# Patient Record
Sex: MALE
[De-identification: names, ages, dates, MRNs, and addresses within clinical notes are randomized per-mention and may not be internally consistent; named-entity substitution may affect disease eponyms.]

---

## 2021-04-20 ENCOUNTER — HOSPITAL ENCOUNTER (INPATIENT)
Dept: HOSPITAL 97 - ER | Age: 41
LOS: 9 days | Discharge: HOME | DRG: 660 | End: 2021-04-29
Attending: FAMILY MEDICINE | Admitting: HOSPITALIST
Payer: SELF-PAY

## 2021-04-20 VITALS — BODY MASS INDEX: 41.1 KG/M2

## 2021-04-20 DIAGNOSIS — K59.00: ICD-10-CM

## 2021-04-20 DIAGNOSIS — N13.8: ICD-10-CM

## 2021-04-20 DIAGNOSIS — E87.8: ICD-10-CM

## 2021-04-20 DIAGNOSIS — F17.210: ICD-10-CM

## 2021-04-20 DIAGNOSIS — N17.9: ICD-10-CM

## 2021-04-20 DIAGNOSIS — E11.22: ICD-10-CM

## 2021-04-20 DIAGNOSIS — I12.9: ICD-10-CM

## 2021-04-20 DIAGNOSIS — N13.6: Primary | ICD-10-CM

## 2021-04-20 DIAGNOSIS — B96.20: ICD-10-CM

## 2021-04-20 DIAGNOSIS — E11.40: ICD-10-CM

## 2021-04-20 DIAGNOSIS — Z79.4: ICD-10-CM

## 2021-04-20 DIAGNOSIS — Z16.12: ICD-10-CM

## 2021-04-20 DIAGNOSIS — E87.1: ICD-10-CM

## 2021-04-20 DIAGNOSIS — R60.9: ICD-10-CM

## 2021-04-20 DIAGNOSIS — E83.42: ICD-10-CM

## 2021-04-20 DIAGNOSIS — R33.9: ICD-10-CM

## 2021-04-20 DIAGNOSIS — N18.9: ICD-10-CM

## 2021-04-20 DIAGNOSIS — N30.00: ICD-10-CM

## 2021-04-20 DIAGNOSIS — Z20.822: ICD-10-CM

## 2021-04-20 DIAGNOSIS — E87.6: ICD-10-CM

## 2021-04-20 DIAGNOSIS — D63.8: ICD-10-CM

## 2021-04-20 DIAGNOSIS — E66.9: ICD-10-CM

## 2021-04-20 DIAGNOSIS — E11.65: ICD-10-CM

## 2021-04-20 DIAGNOSIS — R00.0: ICD-10-CM

## 2021-04-20 DIAGNOSIS — E44.0: ICD-10-CM

## 2021-04-20 LAB
ALBUMIN SERPL BCP-MCNC: 2.8 G/DL (ref 3.4–5)
ALP SERPL-CCNC: 199 U/L (ref 45–117)
ALT SERPL W P-5'-P-CCNC: 12 U/L (ref 12–78)
AST SERPL W P-5'-P-CCNC: 8 U/L (ref 15–37)
BUN BLD-MCNC: 37 MG/DL (ref 7–18)
GLUCOSE SERPLBLD-MCNC: 775 MG/DL (ref 74–106)
HCT VFR BLD CALC: 31.7 % (ref 39.6–49)
INR BLD: 1.05
LIPASE SERPL-CCNC: 160 U/L (ref 73–393)
LYMPHOCYTES # SPEC AUTO: 2.6 K/UL (ref 0.7–4.9)
MAGNESIUM SERPL-MCNC: 2.2 MG/DL (ref 1.8–2.4)
NT-PROBNP SERPL-MCNC: 181 PG/ML (ref ?–125)
PMV BLD: 9.5 FL (ref 7.6–11.3)
POTASSIUM SERPL-SCNC: 3.9 MMOL/L (ref 3.5–5.1)
RBC # BLD: 3.55 M/UL (ref 4.33–5.43)
TROPONIN (EMERG DEPT USE ONLY): < 0.02 NG/ML (ref 0–0.04)

## 2021-04-20 PROCEDURE — 85025 COMPLETE CBC W/AUTO DIFF WBC: CPT

## 2021-04-20 PROCEDURE — 84439 ASSAY OF FREE THYROXINE: CPT

## 2021-04-20 PROCEDURE — 96366 THER/PROPH/DIAG IV INF ADDON: CPT

## 2021-04-20 PROCEDURE — 87086 URINE CULTURE/COLONY COUNT: CPT

## 2021-04-20 PROCEDURE — 96372 THER/PROPH/DIAG INJ SC/IM: CPT

## 2021-04-20 PROCEDURE — 83605 ASSAY OF LACTIC ACID: CPT

## 2021-04-20 PROCEDURE — 74430 CONTRAST X-RAY BLADDER: CPT

## 2021-04-20 PROCEDURE — 93306 TTE W/DOPPLER COMPLETE: CPT

## 2021-04-20 PROCEDURE — 94760 N-INVAS EAR/PLS OXIMETRY 1: CPT

## 2021-04-20 PROCEDURE — 85610 PROTHROMBIN TIME: CPT

## 2021-04-20 PROCEDURE — 87088 URINE BACTERIA CULTURE: CPT

## 2021-04-20 PROCEDURE — 84100 ASSAY OF PHOSPHORUS: CPT

## 2021-04-20 PROCEDURE — 80061 LIPID PANEL: CPT

## 2021-04-20 PROCEDURE — 96375 TX/PRO/DX INJ NEW DRUG ADDON: CPT

## 2021-04-20 PROCEDURE — 87186 SC STD MICRODIL/AGAR DIL: CPT

## 2021-04-20 PROCEDURE — 82947 ASSAY GLUCOSE BLOOD QUANT: CPT

## 2021-04-20 PROCEDURE — 93005 ELECTROCARDIOGRAM TRACING: CPT

## 2021-04-20 PROCEDURE — 80048 BASIC METABOLIC PNL TOTAL CA: CPT

## 2021-04-20 PROCEDURE — 99285 EMERGENCY DEPT VISIT HI MDM: CPT

## 2021-04-20 PROCEDURE — 96367 TX/PROPH/DG ADDL SEQ IV INF: CPT

## 2021-04-20 PROCEDURE — 87077 CULTURE AEROBIC IDENTIFY: CPT

## 2021-04-20 PROCEDURE — 83735 ASSAY OF MAGNESIUM: CPT

## 2021-04-20 PROCEDURE — 82550 ASSAY OF CK (CPK): CPT

## 2021-04-20 PROCEDURE — 51600 INJECTION FOR BLADDER X-RAY: CPT

## 2021-04-20 PROCEDURE — 83880 ASSAY OF NATRIURETIC PEPTIDE: CPT

## 2021-04-20 PROCEDURE — 76377 3D RENDER W/INTRP POSTPROCES: CPT

## 2021-04-20 PROCEDURE — 83690 ASSAY OF LIPASE: CPT

## 2021-04-20 PROCEDURE — 78708 K FLOW/FUNCT IMAGE W/DRUG: CPT

## 2021-04-20 PROCEDURE — 87040 BLOOD CULTURE FOR BACTERIA: CPT

## 2021-04-20 PROCEDURE — 84443 ASSAY THYROID STIM HORMONE: CPT

## 2021-04-20 PROCEDURE — 80076 HEPATIC FUNCTION PANEL: CPT

## 2021-04-20 PROCEDURE — 81003 URINALYSIS AUTO W/O SCOPE: CPT

## 2021-04-20 PROCEDURE — 74176 CT ABD & PELVIS W/O CONTRAST: CPT

## 2021-04-20 PROCEDURE — 71045 X-RAY EXAM CHEST 1 VIEW: CPT

## 2021-04-20 PROCEDURE — 84484 ASSAY OF TROPONIN QUANT: CPT

## 2021-04-20 PROCEDURE — 80053 COMPREHEN METABOLIC PANEL: CPT

## 2021-04-20 PROCEDURE — 36569 INSJ PICC 5 YR+ W/O IMAGING: CPT

## 2021-04-20 PROCEDURE — 76770 US EXAM ABDO BACK WALL COMP: CPT

## 2021-04-20 PROCEDURE — 36415 COLL VENOUS BLD VENIPUNCTURE: CPT

## 2021-04-20 PROCEDURE — 81015 MICROSCOPIC EXAM OF URINE: CPT

## 2021-04-20 PROCEDURE — 96365 THER/PROPH/DIAG IV INF INIT: CPT

## 2021-04-20 PROCEDURE — 83036 HEMOGLOBIN GLYCOSYLATED A1C: CPT

## 2021-04-20 PROCEDURE — 84550 ASSAY OF BLOOD/URIC ACID: CPT

## 2021-04-20 NOTE — ER
Nurse's Notes                                                                                     

 [facility]                                                                 

Name: Julio Cesar Reeves                                                                                

Age: 40 yrs                                                                                       

Sex: Male                                                                                         

: 1980                                                                                   

MRN: [id number]                                                                                   

Arrival Date: 2021                                                                          

Time: 18:32                                                                                       

Account#: [id number]                                                                            

Bed 26                                                                                            

Private MD:                                                                                       

Diagnosis: Retention of urine-bladder outlet obstruction. PVR 1500 CC;Urinary tract infection,    

  site not specified;Acute kidney failure;Type 2 diabetes                                         

  mellitus-uncontrolled;Hydroureter;Hydronephrosis with ureteral stricture, not                   

  elsewhere classified-secondary to bladder outlet obstruction;Essential (primary)                

  hypertension                                                                                    

                                                                                                  

Presentation:                                                                                     

                                                                                             

19:00 Chief complaint: Patient states: I am having numbness, weakness and my feet are swollen rr5 

      started around February. I feel that i gets worse now. I've been diagnosed with kidney      

      infection last February and they said i have kidney infection.                              

19:00 Coronavirus screen: Client denies travel out of the [place] in the last 14 days. At this   rr5 

      time, the client does not indicate any symptoms associated with coronavirus-19. Ebola       

      Screen: Patient negative for fever greater than or equal to 101.5 degrees Fahrenheit,       

      and additional compatible Ebola Virus Disease symptoms Patient denies exposure to           

      infectious person. Patient denies travel to an Ebola-affected area in the 21 days           

      before illness onset. Initial Sepsis Screen: Does the patient meet any 2 criteria? No.      

      Patient's initial sepsis screen is negative. Does the patient have a suspected source       

      of infection? No. Patient's initial sepsis screen is negative. Risk Assessment: Do you      

      want to hurt yourself or someone else? Patient reports no desire to harm self or            

      others. Onset of symptoms was 2021.                                                

19:00 Method Of Arrival: Wheelchair                                                           rr5 

19:00 Acuity: KAREEM 2                                                                           rr5 

                                                                                                  

Historical:                                                                                       

- Allergies:                                                                                      

19:09 No Known Allergies;                                                                     rr5 

- Home Meds:                                                                                      

19:09 None [Active];                                                                          rr5 

- PMHx:                                                                                           

19:09 Diabetes - NIDDM; Hypertension; kidney infection;                                       rr5 

- PSHx:                                                                                           

19:09 None;                                                                                   rr5 

                                                                                                  

- Immunization history:: Adult Immunizations up to date.                                          

- Social history:: Smoking status: Patient reports the use of cigarette tobacco                   

  products, smokes one pack cigarettes per day. Patient uses alcohol, street drugs.               

                                                                                                  

                                                                                                  

Screenin:00 Abuse screen: Denies threats or abuse. Denies injuries from another. Nutritional          

      screening: No deficits noted. Tuberculosis screening: No symptoms or risk factors           

      identified. Fall Risk None identified.                                                      

                                                                                                  

Assessment:                                                                                       

19:30 General: Appears in no apparent distress. Behavior is calm, cooperative, appropriate    wh  

      for age. Pain: Denies pain. Neuro: Level of Consciousness is awake, alert, obeys            

      commands, Oriented to person, place, time, situation, Appropriate for age.                  

      Cardiovascular: Heart tones S1 S2 Edema BLE. Respiratory: Airway is patent Respiratory      

      effort is even, unlabored, Respiratory pattern is regular, symmetrical, Breath sounds       

      are clear bilaterally. GI: Abdomen is round non-distended. : Reports urinary              

      frequency. EENT: No signs and/or symptoms were reported regarding the EENT system.          

      Derm: Skin is intact, is healthy with good turgor, Skin is pink, warm \T\ dry. normal.      

      Musculoskeletal: Circulation, motion, and sensation intact.                                 

21:00 Reassessment: Patient appears in no apparent distress at this time. No changes from       

      previously documented assessment. Patient and/or family updated on plan of care and         

      expected duration. Pain level reassessed. Patient is alert, oriented x 3, equal             

      unlabored respirations, skin warm/dry/pink.                                                 

21:20 Reassessment: MD at bedside explaining POC, Pt refusing all treatments and wanting to   wh  

      sign out AMA.                                                                               

22:00 Reassessment: Pt decided to be admitted and agreeable to POC.                             

23:00 Reassessment: Patient appears in no apparent distress at this time. Patient and/or        

      family updated on plan of care and expected duration. Pain level reassessed. Patient is     

      alert, oriented x 3, equal unlabored respirations, skin warm/dry/pink.                      

                                                                                             

00:00 Reassessment: Provider at bedside explaining POC need foradmit.                           

                                                                                                  

Vital Signs:                                                                                      

                                                                                             

19:00  / 115; Pulse 111; Resp 19; Temp 99; Pulse Ox 100% ; Weight 108.86 kg; Height 5   rr5 

      ft. 4 in. (162.56 cm); Pain 7/10;                                                           

21:00  / 115; Pulse 113; Resp 18; Pulse Ox 100% on R/A;                                 wh  

22:00  / 102; Pulse 115; Resp 18; Pulse Ox 99% ;                                          

                                                                                             

00:00  / 94; Pulse 117; Resp 18; Pulse Ox 100% ;                                        wh  

                                                                                             

19:00 Body Mass Index 41.20 (108.86 kg, 162.56 cm)                                            rr5 

                                                                                                  

ED Course:                                                                                        

                                                                                             

18:32 Patient arrived in ED.                                                                  as  

19:08 Triage completed.                                                                       rr5 

19:10 Arm band placed on right wrist.                                                         rr5 

19:31 Tha Edwards MD is Attending Physician.                                             sheila 

19:40 Missed attempt(s): 22 gauge in left antecubital area. Bleeding controlled, band aid     ds4 

      applied, catheter tip intact.                                                               

20:00 Patient has correct armband on for positive identification. Placed in gown. Bed in low  wh  

      position. Call light in reach. Side rails up X 1. Cardiac monitor on. Pulse ox on. NIBP     

      on.                                                                                         

20:06 XRAY Chest (1 view) In Process Unspecified.                                             EDMS

20:10 Kirby Carranza, RODERICK is Primary Nurse.                                                  mg2 

20:15 CT Stone Protocol In Process Unspecified.                                               EDMS

20:25 Inserted saline lock: 20 gauge in right upper arm, using aseptic technique. Blood       rr5 

      collected.                                                                                  

20:25 First set of blood cultures drawn by me.                                                rr5 

20:40 Second set of blood cultures drawn by me.                                               rr5 

20:42 COVID swab sent to lab.                                                                 rr5 

20:52 initiated a transfer with Latonya Hi from [facility].          mw2 

21:24 doc to doc with the Urologist from [facility].                                 mw2 

21:26 transfer canceled.                                                                      mw2 

21:27 Valeri Gardner MD is Hospitalizing Provider.                                           sheila 

                                                                                             

00:30 No provider procedures requiring assistance completed. Patient admitted, IV remains in    

      place.                                                                                      

                                                                                                  

Administered Medications:                                                                         

                                                                                             

20:35 Drug: NS 0.9% 1000 ml Route: IV; Rate: 1 bolus; Site: right upper arm;                  rr5 

22:55 Follow up: Response: No adverse reaction; IV Status: Completed infusion                   

20:35 Drug: Rocephin - (cefTRIAXone) 1 grams Route: IVPB; Infused Over: 30 mins; Site: right  rr5 

      upper arm;                                                                                  

22:54 Follow up: Response: No adverse reaction; IV Status: Completed infusion                   

20:36 Drug: NS 0.9% 1000 ml Route: IV; Rate: 1 bolus; Site: right upper arm;                  rr5 

22:55 Follow up: Response: No adverse reaction; IV Status: Completed infusion                   

20:38 Drug: Insulin Regular Human 10 units {Co-Signature: rr5 (Sreekanth Angulo RN).} Route:     mg2 

      IVP; Site: right upper arm;                                                                 

22:55 Follow up: Response: No adverse reaction; Blood sugar is lowered                          

20:40 Drug: Insulin Regular Human 10 units {Co-Signature: rr5 (Sreekanth Angulo RN).} Route:     mg2 

      Sub-Q; Site: right lower abdomen;                                                           

22:55 Follow up: Response: No adverse reaction; Blood sugar is lowered                          

22:00 Drug: Viscous Lidocaine Liquid (4 %) 5 ml Route: Mucous Membrane;                         

22:01 Not Given (Duplicate Order): levofloxacin 500 mg 100 ml IVPB once over 60 mins          [facility] 

22:08 Drug: levofloxacin 500 mg Volume: 100 ml; Route: IVPB; Infused Over: 60 mins; Site:       

      right upper arm;                                                                            

22:54 Follow up: Response: No adverse reaction; IV Status: Completed infusion                   

22:54 Drug: Meropenem 1 grams Route: IV; Rate: per protocol; Site: right upper arm;             

                                                                                             

00:36 Follow up: Response: No adverse reaction; IV Status: Completed infusion                   

                                                                                             

23:51 Drug: Zofran (Ondansetron) 4 mg Route: IVP; Site: right upper arm;                        

                                                                                             

00:36 Follow up: Response: No adverse reaction                                                  

                                                                                             

23:53 Drug: morphine 4 mg {Note: RASS 0.} Route: IVP; Site: right upper arm;                    

                                                                                             

00:36 Follow up: Response: No adverse reaction; Pain is decreased; RASS: Alert and Calm (0)     

                                                                                             

23:55 Drug: LanTUS (insulin glargine) 30 units Route: Sub-Q; Site: right lower abdomen;         

                                                                                             

00:36 Follow up: Response: No adverse reaction                                                  

                                                                                             

23:57 Drug: Insulin Regular Human 10 units {Co-Signature: mg2 (Kirby Carranza RN).} Route:   wh  

      IVP; Site: right upper arm;                                                                 

                                                                                             

00:53 Follow up: Response: No adverse reaction; Blood sugar is lowered                          

                                                                                                  

                                                                                                  

Outcome:                                                                                          

                                                                                             

20:48 ER care complete, transfer ordered by MD.                                               sheila 

21:30 Decision to Hospitalize by Provider.                                                    sheila 

                                                                                             

00:30 Admitted to ER Hold.  Please see [facility] for further documentation.                      

      Condition: stable                                                                           

      Instructed on the need for admit.                                                           

18:06 Patient left the ED.                                                                    aa5 

                                                                                                  

Signatures:                                                                                       

Dispatcher [employer]                           EDTha Solis MD MD cha Martinez, Amelia as Calderon, Audri, RN                     RN   aa5                                                  

Randal Mendoza                             ds4                                                  

Kapil London RN                       RN                                                      

Susanna Rankin                            2                                                  

Kirby Carranza RN                    RN   mg2                                                  

Sreekanth Angulo RN                      RN   rr5                                                  

Sreekanth Angulo RN                             rr5                                                  

Kirby Carranza RN                           mg2                                                  

                                                                                                  

Corrections: (The following items were deleted from the chart)                                    

                                                                                             

19:13 19:00 Acuity: KAREEM 3 rr5                                                                 rr5 

                                                                                                  

**************************************************************************************************

## 2021-04-20 NOTE — EDPHYS
Physician Documentation                                                                           

 [facility]                                                                 

Name: Julio Cesar Reeves                                                                                

Age: 40 yrs                                                                                       

Sex: Male                                                                                         

: 1980                                                                                   

MRN: [id number]                                                                                   

Arrival Date: 2021                                                                          

Time: 18:32                                                                                       

Account#: [id number]                                                                            

Bed 26                                                                                            

Private MD:                                                                                       

ED Physician Tha Edwards                                                                      

HPI:                                                                                              

                                                                                             

19:40 This 40 yrs old  Male presents to ER via Wheelchair with complaints of Leg      sheila 

      Swelling, Feet Swelling, Numbness, kidney infection.                                        

19:40 The patient's problem is reported as weakness, that is generalized. Onset: The          sheila 

      symptoms/episode began/occurred 1 week(s) ago. Duration: The episode is continuous.         

      Context: the episode(s) was witnessed, by family. The symptoms are alleviated by            

      nothing. The symptoms are aggravated by nothing. Severity of symptoms: At their worst       

      the symptoms were mild in the emergency department the symptoms are unchanged.              

      Patient's baseline: Neuro: alert and fully oriented, Motor: no deficits, Ambulation:        

      walks without assistance, Speech: normal, normal for age. The patient has experienced       

      similar episodes in the past, several times.                                                

                                                                                                  

Historical:                                                                                       

- Allergies:                                                                                      

19:09 No Known Allergies;                                                                     rr5 

- Home Meds:                                                                                      

19:09 None [Active];                                                                          rr5 

- PMHx:                                                                                           

19:09 Diabetes - NIDDM; Hypertension; kidney infection;                                       rr5 

- PSHx:                                                                                           

19:09 None;                                                                                   rr5 

                                                                                                  

- Immunization history:: Adult Immunizations up to date.                                          

- Social history:: Smoking status: Patient reports the use of cigarette tobacco                   

  products, smokes one pack cigarettes per day. Patient uses alcohol, street drugs.               

                                                                                                  

                                                                                                  

ROS:                                                                                              

19:43 Constitutional: Negative for fever, chills, and weight loss, Eyes: Negative for injury, sheila 

      pain, redness, and discharge, ENT: Negative for injury, pain, and discharge, Neck:          

      Negative for injury, pain, and swelling, Cardiovascular: Negative for chest pain,           

      palpitations, and edema, Respiratory: Negative for shortness of breath, cough,              

      wheezing, and pleuritic chest pain, Abdomen/GI: Negative for abdominal pain, nausea,        

      vomiting, diarrhea, and constipation, Back: Negative for injury and pain, MS/Extremity:     

      Negative for injury and deformity, Skin: Negative for injury, rash, and discoloration,      

      Psych: Negative for depression, anxiety, suicide ideation, homicidal ideation, and          

      hallucinations, Allergy/Immunology: Negative for hives, rash, and allergies, Endocrine:     

      Negative for neck swelling, polydipsia, polyuria, polyphagia, and marked weight changes.    

19:43 : Positive for urinary symptoms, urinary frequency.                                       

                                                                                                  

Exam:                                                                                             

19:43 Constitutional:  This is a well developed, well nourished patient who is awake, alert,  sheila 

      and in no acute distress. Head/Face:  Normocephalic, atraumatic. Eyes:  Pupils equal        

      round and reactive to light, extra-ocular motions intact.  Lids and lashes normal.          

      Conjunctiva and sclera are non-icteric and not injected.  Cornea within normal limits.      

      Periorbital areas with no swelling, redness, or edema. ENT:  Nares patent. No nasal         

      discharge, no septal abnormalities noted.  Tympanic membranes are normal and external       

      auditory canals are clear.  Oropharynx with no redness, swelling, or masses, exudates,      

      or evidence of obstruction, uvula midline.  Mucous membranes moist. Neck:  Trachea          

      midline, no thyromegaly or masses palpated, and no cervical lymphadenopathy.  Supple,       

      full range of motion without nuchal rigidity, or vertebral point tenderness.  No            

      Meningismus. Chest/axilla:  Normal chest wall appearance and motion.  Nontender with no     

      deformity.  No lesions are appreciated. Respiratory:  Lungs have equal breath sounds        

      bilaterally, clear to auscultation and percussion.  No rales, rhonchi or wheezes noted.     

       No increased work of breathing, no retractions or nasal flaring. Abdomen/GI:  Soft,        

      non-tender, with normal bowel sounds.  No distension or tympany.  No guarding or            

      rebound.  No evidence of tenderness throughout. Back:  No spinal tenderness.  No            

      costovertebral tenderness.  Full range of motion. Male :  Normal genitalia with no        

      discharge or lesions. Skin:  Warm, dry with normal turgor.  Normal color with no            

      rashes, no lesions, and no evidence of cellulitis. MS/ Extremity:  Pulses equal, no         

      cyanosis.  Neurovascular intact.  Full, normal range of motion. Neuro:  Awake and           

      alert, GCS 15, oriented to person, place, time, and situation.  Cranial nerves II-XII       

      grossly intact.  Motor strength 5/5 in all extremities.  Sensory grossly intact.            

      Cerebellar exam normal.  Normal gait. Psych:  Awake, alert, with orientation to person,     

      place and time.  Behavior, mood, and affect are within normal limits.                       

19:43 Cardiovascular: Rate: tachycardic, Rhythm: regular, Pulses: Pulses are 4+ in bilateral      

      radial, brachial, femoral, popliteal, posterior tibial and and dorsalis pedis               

      arteries.. Heart sounds: murmur, not appreciated, Edema: is not appreciated, JVD: is        

      not appreciated.                                                                            

19:43 Abdomen/GI: Exam negative for acute changes.                                                

19:43 Back: Exam negative for acute changes.                                                      

20:57 ECG was reviewed by the Attending Physician.                                            [facility] 

                                                                                                  

Vital Signs:                                                                                      

19:00  / 115; Pulse 111; Resp 19; Temp 99; Pulse Ox 100% ; Weight 108.86 kg; Height 5   rr5 

      ft. 4 in. (162.56 cm); Pain 7/10;                                                           

21:00  / 115; Pulse 113; Resp 18; Pulse Ox 100% on R/A;                                 wh  

22:00  / 102; Pulse 115; Resp 18; Pulse Ox 99% ;                                          

                                                                                             

00:00  / 94; Pulse 117; Resp 18; Pulse Ox 100% ;                                          

                                                                                             

19:00 Body Mass Index 41.20 (108.86 kg, 162.56 cm)                                            rr5 

                                                                                                  

MDM:                                                                                              

                                                                                             

19:31 Patient medically screened.                                                             [facility] 

19:45 Differential diagnosis: nonspecific abdominal pain, UTI, urinary retention,             sheila 

      prostatitis, urethritis. Differential Diagnosis altered mental status, sepsis. Data         

      reviewed: vital signs, nurses notes, lab test result(s), EKG, radiologic studies, CT        

      scan, plain films. Data interpreted: Cardiac monitor: rate is 111 beats/min, rhythm is      

      regular, Pulse oximetry: on room air is 100 %. Test interpretation: by ED physician or      

      midlevel provider: ECG, plain radiologic studies. Counseling: I had a detailed              

      discussion with the patient and/or guardian regarding: the historical points, exam          

      findings, and any diagnostic results supporting the discharge/admit diagnosis, lab          

      results, radiology results.                                                                 

                                                                                                  

                                                                                             

19:24 Order name: Glucose, Ancillary Testing; Complete Time: 20:43                            [facility]

                                                                                             

19:40 Order name: Basic Metabolic Panel                                                       [facility] 

                                                                                             

19:40 Order name: CBC with Diff                                                               [facility] 

                                                                                             

19:40 Order name: LFT's                                                                       [facility] 

                                                                                             

19:40 Order name: Magnesium                                                                   [facility] 

                                                                                             

19:40 Order name: NT PRO-BNP                                                                  [facility] 

                                                                                             

19:40 Order name: PT-INR                                                                      [facility] 

                                                                                             

19:40 Order name: Troponin (emerg Dept Use Only)                                              [facility] 

                                                                                             

19:40 Order name: Blood Culture Adult (2)                                                     [facility] 

                                                                                             

19:40 Order name: Lipase                                                                      [facility] 

                                                                                             

19:40 Order name: Lactate; Complete Time: 22:00                                               [facility] 

                                                                                             

19:40 Order name: Urine Culture                                                               [facility] 

                                                                                             

19:40 Order name: ABG                                                                         [facility] 

                                                                                             

19:41 Order name: Basic Metabolic Panel                                                       [facility]

                                                                                             

19:41 Order name: CBC with Automated Diff; Complete Time: 20:43                               [facility]

                                                                                             

19:41 Order name: Liver (Hepatic) Function                                                    [facility]

                                                                                             

19:41 Order name: Magnesium                                                                   [facility]

                                                                                             

19:41 Order name: NT PRO-BNP                                                                  [facility]

                                                                                             

19:41 Order name: Protime (+INR); Complete Time: 20:43                                        [facility]

                                                                                             

19:41 Order name: Troponin (Emerg Dept Use Only)                                              [facility]

                                                                                             

19:48 Order name: Glucose, Ancillary Testing; Complete Time: 20:43                            [facility]

                                                                                             

20:15 Order name: Urine Dipstick-Ancillary; Complete Time: 20:43                              [facility]

                                                                                             

20:17 Order name: Urine Microscopic Only                                                      rr5 

                                                                                             

20:17 Order name: Urine Microscopic Only; Complete Time: 21:21                                [facility]

                                                                                             

22:18 Order name: SARS-COV-2 RT PCR; Complete Time: 23:13                                     [facility]

                                                                                             

23:00 Order name: Glucose                                                                       

                                                                                             

23:01 Order name: Glucose Level                                                               [facility]

                                                                                             

23:12 Order name: Glucose, Ancillary Testing; Complete Time: 23:13                            [facility]

                                                                                             

00:30 Order name: Lactate Sepsis 2 HR Follow-up                                               [facility]

                                                                                             

19:40 Order name: XRAY Chest (1 view); Complete Time: 20:43                                   [facility] 

                                                                                             

19:46 Order name: CT Stone Protocol; Complete Time: 20:43                                     [facility] 

                                                                                             

01:49 Order name: Glucose, Ancillary Testing                                                  [facility]

                                                                                             

02:18 Order name: CPK                                                                         ej  

                                                                                             

02:18 Order name: Uric Acid                                                                   ej  

                                                                                             

04:04 Order name: Glucose, Ancillary Testing                                                  [facility]

                                                                                             

05:17 Order name: T4 Free                                                                     [facility]

                                                                                             

05:17 Order name: Thyroid Stimulating Hormone                                                 [facility]

                                                                                             

05:48 Order name: CBC with Automated Diff                                                     [facility]

                                                                                             

06:15 Order name: Glucose, Ancillary Testing                                                  [facility]

                                                                                             

06:21 Order name: Uric Acid                                                                   [facility]

                                                                                             

06:21 Order name: Creatine Phosphokinase                                                      [facility]

                                                                                             

06:34 Order name: Comprehensive Metabolic Panel                                               [facility]

                                                                                             

06:34 Order name: Phosphorus                                                                  [facility]

                                                                                             

06:34 Order name: Lipid Profile                                                               [facility]

                                                                                             

06:34 Order name: Magnesium                                                                   [facility]

                                                                                             

06:45 Order name: LDL, Direct                                                                 [facility]

                                                                                             

12:25 Order name: Glucose, Ancillary Testing                                                  [facility]

                                                                                             

18:05 Order name: Glucose, Ancillary Testing                                                  [facility]

                                                                                             

19:40 Order name: EKG; Complete Time: 19:41                                                   [facility] 

                                                                                             

19:40 Order name: Cardiac monitoring; Complete Time: 20:52                                    [facility] 

                                                                                             

19:40 Order name: EKG - Nurse/Tech; Complete Time: 20:52                                      [facility] 

                                                                                             

19:40 Order name: IV Saline Lock; Complete Time: 20:42                                        [facility] 

                                                                                             

19:40 Order name: Labs collected and sent; Complete Time: 19:47                               [facility] 

                                                                                             

19:40 Order name: O2 Per Protocol; Complete Time: 19:47                                       [facility] 

                                                                                             

19:40 Order name: O2 Sat Monitoring; Complete Time: 19:47                                     [facility] 

                                                                                             

19:40 Order name: Urine Dipstick-Ancillary (obtain specimen); Complete Time: 20:41            [facility] 

                                                                                             

20:46 Order name: Newton; Complete Time: 22:02                                                 [facility] 

                                                                                             

23:08 Order name: CONS Physician Consult                                                      [facility]

                                                                                                  

EC:57 Rate is 113 beats/min. Rhythm is regular. QRS Axis is Normal. [place] interval is normal.    [facility] 

      QRS interval is normal. QT interval is normal. No Q waves. T waves are Normal. No ST        

      changes noted. Clinical impression: NSR w/ Non-specific ST/T Changes and No evidence of     

      ischemia. Interpreted by me. Reviewed by me.                                                

                                                                                                  

Administered Medications:                                                                         

20:35 Drug: NS 0.9% 1000 ml Route: IV; Rate: 1 bolus; Site: right upper arm;                  rr5 

22:55 Follow up: Response: No adverse reaction; IV Status: Completed infusion                   

20:35 Drug: Rocephin - (cefTRIAXone) 1 grams Route: IVPB; Infused Over: 30 mins; Site: right  rr5 

      upper arm;                                                                                  

22:54 Follow up: Response: No adverse reaction; IV Status: Completed infusion                   

20:36 Drug: NS 0.9% 1000 ml Route: IV; Rate: 1 bolus; Site: right upper arm;                  rr5 

22:55 Follow up: Response: No adverse reaction; IV Status: Completed infusion                   

20:38 Drug: Insulin Regular Human 10 units {Co-Signature: rr5 (Sreekanth Angulo RN).} Route:     mg2 

      IVP; Site: right upper arm;                                                                 

22:55 Follow up: Response: No adverse reaction; Blood sugar is lowered                          

20:40 Drug: Insulin Regular Human 10 units {Co-Signature: rr5 (Sreekanth Angulo RN).} Route:     mg2 

      Sub-Q; Site: right lower abdomen;                                                           

22:55 Follow up: Response: No adverse reaction; Blood sugar is lowered                          

22:00 Drug: Viscous Lidocaine Liquid (4 %) 5 ml Route: Mucous Membrane;                         

22:01 Not Given (Duplicate Order): levofloxacin 500 mg 100 ml IVPB once over 60 mins          [facility] 

22:08 Drug: levofloxacin 500 mg Volume: 100 ml; Route: IVPB; Infused Over: 60 mins; Site:       

      right upper arm;                                                                            

22:54 Follow up: Response: No adverse reaction; IV Status: Completed infusion                   

:54 Drug: Meropenem 1 grams Route: IV; Rate: per protocol; Site: right upper arm;             

                                                                                             

00:36 Follow up: Response: No adverse reaction; IV Status: Completed infusion                   

                                                                                             

23:51 Drug: Zofran (Ondansetron) 4 mg Route: IVP; Site: right upper arm;                        

                                                                                             

00:36 Follow up: Response: No adverse reaction                                                  

                                                                                             

23:53 Drug: morphine 4 mg {Note: RASS 0.} Route: IVP; Site: right upper arm;                    

                                                                                             

00:36 Follow up: Response: No adverse reaction; Pain is decreased; RASS: Alert and Calm (0)     

                                                                                             

23:55 Drug: LanTUS (insulin glargine) 30 units Route: Sub-Q; Site: right lower abdomen;         

                                                                                             

00:36 Follow up: Response: No adverse reaction                                                  

                                                                                             

23:57 Drug: Insulin Regular Human 10 units {Co-Signature: mg2 (Kirby Carranza RN).} Route:     

      IVP; Site: right upper arm;                                                                 

                                                                                             

00:53 Follow up: Response: No adverse reaction; Blood sugar is lowered                          

                                                                                                  

                                                                                                  

Disposition:                                                                                      

21 21:30 Hospitalization ordered by Valeri Gardner for Inpatient Admission. Preliminary     

  diagnosis are Retention of urine - bladder outlet obstruction. PVR 1500 CC,                     

  Urinary tract infection, site not specified, Acute kidney failure, Type 2                       

  diabetes mellitus - uncontrolled, Hydroureter, Hydronephrosis with ureteral                     

  stricture, not elsewhere classified - secondary to bladder outlet                               

  obstruction, Essential (primary) hypertension.                                                  

- Bed requested for Telemetry/MedSurg (Inpatient).                                                

- Status is Inpatient Admission.                                                              aa5 

- Condition is Fair.                                                                              

- Problem is new.                                                                                 

- Symptoms have improved.                                                                         

                                                                                                  

                                                                                                  

                                                                                                  

Signatures:                                                                                       

Dispatcher [employer]                           EDMS                                                 

Gemma Leonard, Tha Cross RN, MD MD cha Calderon, Audri RN                     RN   aa5                                                  

Tika Jack RN RN                                                      

Kapil London RN RN                                                      

Kirby Carranza RN RN   mg2                                                  

Sreekanth Angulo RN                      RN   rr5                                                  

Sreekanth Angulo RN                             rr5                                                  

Kirby Carranza RN                           mg2                                                  

                                                                                                  

Corrections: (The following items were deleted from the chart)                                    

                                                                                             

20:50 20:48 2021 20:48 [facility] Diagnosis is Type 2 diabetes    sheila 

      mellitus - uncontrolled; Urinary tract infection, site not specified; Hydroureter;          

      Hydronephrosis with ureteral stricture, not elsewhere classified; Retention of urine;       

      Obesity, unspecified; Elevated white blood cell count. Reason for transfer: Higher          

      level of care. Accepting physician is to [facility], medicine , urology. Condition is Fair.       

      Problem is new. Symptoms have improved. sheila                                                 

21:19 19:47 CORONAVIRUS+MR.LAB.BRZ ordered. [facility]

21:26 20:50 2021 20:48 [facility] Diagnosis is Type 2 diabetes    sheila 

      mellitus - uncontrolled; Urinary tract infection, site not specified; Hydroureter;          

      Hydronephrosis with ureteral stricture, not elsewhere classified; Retention of urine;       

      Obesity, unspecified; Elevated white blood cell count; Essential (primary)                  

      hypertension; Acute kidney failure. Reason for transfer: Higher level of care.              

      Accepting physician is to [facility], medicine , urology. Condition is Fair. Problem is new.      

      Symptoms have improved. sheila                                                                 

22:02 21:30 Hospitalization Ordered by Valeri Gardner MD for Inpatient Admission. Preliminary  sheila 

      diagnosis is Retention of urine - bladder outlet obstruction; Urinary tract infection,      

      site not specified; Acute kidney failure; Type 2 diabetes mellitus - uncontrolled;          

      Hydroureter; Hydronephrosis with ureteral stricture, not elsewhere classified -             

      secondary to bladder outlet obstruction. Bed requested for Telemetry/MedSurg                

      (Inpatient). Status is Inpatient Admission. Condition is Fair. Problem is new. Symptoms     

      have improved. sheila                                                                          

22:08 22:02 2021 21:30 Hospitalization Ordered by Valeri Gardner MD for Inpatient        sheila 

      Admission. Preliminary diagnosis is Retention of urine - bladder outlet obstruction;        

      Urinary tract infection, site not specified; Acute kidney failure; Type 2 diabetes          

      mellitus - uncontrolled; Hydroureter; Hydronephrosis with ureteral stricture, not           

      elsewhere classified - secondary to bladder outlet obstruction; Essential (primary)         

      hypertension. Bed requested for Telemetry/MedSurg (Inpatient). Status is Inpatient          

      Admission. Condition is Fair. Problem is new. Symptoms have improved. sheila                   

: 22:08 2021 21:30 Hospitalization Ordered by Valeri Gardner MD for Inpatient        cg  

      Admission. Preliminary diagnosis is Retention of urine - bladder outlet obstruction.        

      PVR 1500 CC; Urinary tract infection, site not specified; Acute kidney failure; Type 2      

      diabetes mellitus - uncontrolled; Hydroureter; Hydronephrosis with ureteral stricture,      

      not elsewhere classified - secondary to bladder outlet obstruction; Essential (primary)     

      hypertension. Bed requested for Telemetry/MedSurg (Inpatient). Status is Inpatient          

      Admission. Condition is Fair. Problem is new. Symptoms have improved. sheila                   

                                                                                             

17:02  23:31 2021 21:30 Hospitalization Ordered by Valeri Gardner MD for Inpatient  dw  

      Admission. Preliminary diagnosis is Retention of urine - bladder outlet obstruction.        

      PVR 1500 CC; Urinary tract infection, site not specified; Acute kidney failure; Type 2      

      diabetes mellitus - uncontrolled; Hydroureter; Hydronephrosis with ureteral stricture,      

      not elsewhere classified - secondary to bladder outlet obstruction; Essential (primary)     

      hypertension. Bed requested for [facility] HOLD. Status is Inpatient Admission. Condition      

      is Fair. Problem is new. Symptoms have improved. cg                                         

                                                                                             

18:06 17:02 2021 21:30 Hospitalization Ordered by Valeri Gardner MD for Inpatient        aa5 

      Admission. Preliminary diagnosis is Retention of urine - bladder outlet obstruction.        

      PVR 1500 CC; Urinary tract infection, site not specified; Acute kidney failure; Type 2      

      diabetes mellitus - uncontrolled; Hydroureter; Hydronephrosis with ureteral stricture,      

      not elsewhere classified - secondary to bladder outlet obstruction; Essential (primary)     

      hypertension. Bed requested for Telemetry/MedSurg (Inpatient). Status is Inpatient          

      Admission. Condition is Fair. Problem is new. Symptoms have improved. dw                    

                                                                                                  

**************************************************************************************************

## 2021-04-20 NOTE — RAD REPORT
EXAM DESCRIPTION:  RAD - Chest Single View - 4/20/2021 8:06 pm

 

CLINICAL HISTORY:  SWELLING

Chest pain.

 

COMPARISON:  No comparisons

 

FINDINGS:  Portable technique limits examination quality.

 

Mild interstitial pulmonary edema suspected. The heart is upper limit normal in size with a tortuous 
thoracic aorta. No displaced fractures.

## 2021-04-20 NOTE — XMS REPORT
Continuity of Care Document

                            Created on:2021



Patient:JUANCARLOS BREWER

Sex:Male

:1980

External Reference #:[id number]





Demographics







                          Address                   [address]



                                                    [place] [address]

 

                          Home Phone                [phone]

 

                          Mobile Phone              [phone]

 

                          Preferred Language        English

 

                          Marital Status            Unknown

 

                          Congregational Affiliation     000

 

                          Race                      Unknown

 

                          Additional Race(s)        Unavailable



                                                    White

 

                          Ethnic Group               or 









Author







                          Organization              [facility]

t

 

                          Address                   [address]



                                                    [place] [address]

 

                          Phone                     [phone]









Care Team Providers







                    Name                Role                Phone

 

                    BERNARD Khan MD        Attending Clinician [phone]









Problems

This patient has no known problems.



Allergies, Adverse Reactions, Alerts

This patient has no known allergies or adverse reactions.



Medications

This patient has no known medications.



Procedures

This patient has no known procedures.



Encounters







        Start   End     Encounter Admission Attending Care    Care    Encounter 

Source



        Date/Time Date/Time Type    Type    Clinicians Facility Department ID   

   

 

        2021 Emergency         Erin,  TRAUMA  [phone] [id number]

[id number] 



        18:53:00 22:47:00                 Ramesh PORTER  [id number]         



                                                        [id number]         



                                                        [phone]         



                                                        014             







Results

This patient has no known results.

## 2021-04-20 NOTE — RAD REPORT
EXAM DESCRIPTION:  CT - Stone Protocol - 4/20/2021 8:15 pm

 

CLINICAL HISTORY:  Flank pain.

HEMATURIA

 

COMPARISON:  No comparisons

 

TECHNIQUE:  Axial images were obtained without oral or IV contrast. Lack of contrast limits solid org
an and vascular assessment. The field-of-view spans the entirety of the  system partially obscuring
 uppermost abdomen and lung bases. Coronal reformatted images were obtained and reviewed.

 

All CT scans are performed using dose optimization technique as appropriate and may include automated
 exposure control or mA/KV adjustment according to patient size.

 

FINDINGS:  The lower lung fields are clear.

 

Imaged portions of the liver and spleen show no suspicious findings on non-contrast imaging. The panc
reas and adrenal glands are normal. No pathologic lymphadenopathy in the abdomen or pelvis.

 

Severe bilateral hydroureter and hydronephrosis is present. The urinary bladder is distended and cont
ains air. No obstructing renal calculus seen.

 

No bowel obstruction, free air, free fluid or abscess. Normal appendix noted.Fat containing bilateral
 inguinal hernias, larger on the right.

 

No significant bony abnormality.

 

IMPRESSION:  Severe bilateral hydroureter, hydronephrosis in urinary bladder distention is present. A
ir is also present in the bladder, which may be related to infection or recent instrumentation. A gissel
dder outlet obstruction is suspected.

## 2021-04-21 LAB
ALBUMIN SERPL BCP-MCNC: 2.5 G/DL (ref 3.4–5)
ALP SERPL-CCNC: 147 U/L (ref 45–117)
ALT SERPL W P-5'-P-CCNC: 15 U/L (ref 12–78)
AST SERPL W P-5'-P-CCNC: 8 U/L (ref 15–37)
BUN BLD-MCNC: 35 MG/DL (ref 7–18)
GLUCOSE SERPLBLD-MCNC: 442 MG/DL (ref 74–106)
HCT VFR BLD CALC: 30 % (ref 39.6–49)
HDLC SERPL-MCNC: 20 MG/DL (ref 40–60)
LYMPHOCYTES # SPEC AUTO: 3.2 K/UL (ref 0.7–4.9)
MAGNESIUM SERPL-MCNC: 2.2 MG/DL (ref 1.8–2.4)
PMV BLD: 9.2 FL (ref 7.6–11.3)
POTASSIUM SERPL-SCNC: 4.4 MMOL/L (ref 3.5–5.1)
RBC # BLD: 3.41 M/UL (ref 4.33–5.43)
TSH SERPL DL<=0.05 MIU/L-ACNC: 2.35 UIU/ML (ref 0.36–3.74)
URATE SERPL-MCNC: 7.7 MG/DL (ref 3.5–7.2)

## 2021-04-21 RX ADMIN — NICOTINE SCH MG: 14 PATCH TRANSDERMAL at 09:00

## 2021-04-21 RX ADMIN — MORPHINE SULFATE PRN MG: 2 INJECTION, SOLUTION INTRAMUSCULAR; INTRAVENOUS at 22:42

## 2021-04-21 RX ADMIN — MORPHINE SULFATE PRN MG: 2 INJECTION, SOLUTION INTRAMUSCULAR; INTRAVENOUS at 09:24

## 2021-04-21 RX ADMIN — SODIUM CHLORIDE SCH MLS: 0.9 INJECTION, SOLUTION INTRAVENOUS at 00:31

## 2021-04-21 RX ADMIN — Medication SCH ML: at 08:42

## 2021-04-21 RX ADMIN — HUMAN INSULIN SCH UNIT: 100 INJECTION, SOLUTION SUBCUTANEOUS at 06:13

## 2021-04-21 RX ADMIN — HUMAN INSULIN SCH UNIT: 100 INJECTION, SOLUTION SUBCUTANEOUS at 22:39

## 2021-04-21 RX ADMIN — HEPARIN SODIUM SCH UNIT: 5000 INJECTION, SOLUTION INTRAVENOUS; SUBCUTANEOUS at 01:00

## 2021-04-21 RX ADMIN — Medication SCH MG: at 13:35

## 2021-04-21 RX ADMIN — HUMAN INSULIN SCH UNIT: 100 INJECTION, SOLUTION SUBCUTANEOUS at 12:00

## 2021-04-21 RX ADMIN — Medication SCH ML: at 20:03

## 2021-04-21 RX ADMIN — Medication SCH MG: at 08:42

## 2021-04-21 RX ADMIN — Medication SCH MG: at 20:03

## 2021-04-21 RX ADMIN — SODIUM CHLORIDE SCH MLS: 0.9 INJECTION, SOLUTION INTRAVENOUS at 20:01

## 2021-04-21 RX ADMIN — MEROPENEM SCH MLS: 1 INJECTION INTRAVENOUS at 21:00

## 2021-04-21 RX ADMIN — MEROPENEM SCH MLS: 1 INJECTION INTRAVENOUS at 08:42

## 2021-04-21 RX ADMIN — MORPHINE SULFATE PRN MG: 2 INJECTION, SOLUTION INTRAMUSCULAR; INTRAVENOUS at 17:55

## 2021-04-21 RX ADMIN — MORPHINE SULFATE PRN MG: 2 INJECTION, SOLUTION INTRAMUSCULAR; INTRAVENOUS at 13:36

## 2021-04-21 RX ADMIN — HEPARIN SODIUM SCH: 5000 INJECTION, SOLUTION INTRAVENOUS; SUBCUTANEOUS at 08:42

## 2021-04-21 RX ADMIN — HUMAN INSULIN SCH UNIT: 100 INJECTION, SOLUTION SUBCUTANEOUS at 18:00

## 2021-04-21 RX ADMIN — HEPARIN SODIUM SCH UNIT: 5000 INJECTION, SOLUTION INTRAVENOUS; SUBCUTANEOUS at 17:00

## 2021-04-21 NOTE — EKG
Test Date:    2021-04-20               Test Time:    20:52:11

Technician:   DANA                                    

                                                     

MEASUREMENT RESULTS:                                       

Intervals:                                           

Rate:         113                                    

OK:           138                                    

QRSD:         92                                     

QT:           362                                    

QTc:          496                                    

Axis:                                                

P:            18                                     

OK:           138                                    

QRS:          -13                                    

T:            14                                     

                                                     

INTERPRETIVE STATEMENTS:                                       

                                                     

Sinus tachycardia

Nonspecific ST abnormality

Abnormal ECG

No previous ECG available for comparison



Electronically Signed On 04-21-21 16:07:04 CDT by Clinton Topete

## 2021-04-21 NOTE — P.HP
Certification for Inpatient


Patient admitted to: Inpatient


With expected LOS: >2 Midnights


Patient will require the following post-hospital care: None


Practitioner: I am a practitioner with admitting privileges, knowledge of 

patient current condition, hospital course, and medical plan of care.


Services: Services provided to patient in accordance with Admission requirements

found in Title 42 Section 412.3 of the Code of Federal Regulations





<Pravin Diaz S - Last Filed: 04/21/21 05:25>





Patient History


Date of Service: 04/21/21


Primary Care Provider: Sadie


Reason for admission: UTI, EROS, hydronephrosis


History of Present Illness: 


Mr. Reeves is a 39 yo M with DM and HTN here who was in his usual state of 

health until February. He says 2 weeks after starting new medications for his 

DM, he began having hematuria for 3-4 days. He was seen at [facility] and was told he 

had a severe UTI and needed IV abx but he left AMA. He has not taken any oral 

antibiotics. He says he has had night sweats, chills, bilateral flank pain, 

dizziness, frequency, incontinence, and difficulty walking. He denies urgency, 

diarrhea, constipation, nausea. He says he was taking up to 10 extra strength 

tylenol a day for his symptoms. He smokes 1ppd. CT scan showed Severe bilateral 

hydroureter, hydronephrosis in urinary bladder distention is present. Air is 

also present in the bladder, which may be related to infection or recent 

instrumentation. A bladder outlet obstruction is suspected. WBC 13.4. 





- Past Medical/Surgical History


Has patient received pneumonia vaccine in the past: No


Diabetic: Yes


-: DM


-: HTN


-: Kidney Infection


Past Surgical History: Patient denies surgical history





- Family History


  ** Mother


-: Diabetes





  ** Father


-: Diabetes





- Social History


Smoking Status: Heavy Tobacco smoker (>10 cigarettes/day)


Alcohol use: No


CD- Drugs: No


Caffeine use: No


Place of Residence: Home





<Pravin Diaz - Last Filed: 04/21/21 05:25>


Date of Service: 04/21/21





<Valeri Gardner - Last Filed: 04/26/21 06:01>


Allergies





No Known Allergies Allergy (Verified 04/21/21 00:25)


   





Home Medications: 








NK [No Home Meds]  04/21/21 








Review of Systems


General: Chills, Sweats, As per HPI


Eyes: Unremarkable


ENT: Unremarkable


Respiratory: Unremarkable


Cardiovascular: Unremarkable


Gastrointestinal: Vomiting, Abdominal Pain, As per HPI


Genitourinary: Frequency, Incontinence, Hematuria, Retention, As per HPI


Musculoskeletal: Back Pain, As per HPI


Integumentary: Unremarkable


Neurological: Unremarkable


Lymphatics: Unremarkable





<Pravin Diaz - Last Filed: 04/21/21 05:25>





Physical Examination





- Vital Signs


Temperature: 98.2 F


Blood Pressure: 154/94


Pulse: 117


Respirations: 18


Pulse Ox (%): 100





- Physical Exam


General: Alert, In no apparent distress, Oriented x3


HEENT: Atraumatic, Normocephalic, PERRLA, Mucous membr. moist/pink, EOMI, 

Sclerae nonicteric


Neck: Supple, 2+ carotid pulse no bruit, JVD not distended, No Thyromegaly, No 

LAD


Respiratory: Clear to auscultation bilaterally, Normal air movement


Cardiovascular: No edema, Normal pulses, Regular rate/rhythm, Normal S1 S2, No 

gallops, No rubs, No murmurs


Capillary refill: <2 Seconds


Gastrointestinal: Normal bowel sounds, Soft and benign, No ascites, No 

tenderness, No masses, No rebound, No guarding


Musculoskeletal: No clubbing, No swelling, No contractures, No erythema, No 

tenderness, No warmth


Integumentary: No rashes, No breakdown, No significant lesion, No 

tenderness/swelling, No erythema, No warmth, No cyanosis


Neurological: Normal gait, Normal speech, Normal strength at 5/5 x4 extr, Normal

tone, Sensation intact, Cranial nerves 3-12 intact, Normal affect


Lymphatics: No axilla or inguinal lymphadenopathy


Urinary: De Oliveira catheter





- Studies


Laboratory Data (last 24 hrs)





04/20/21 20:05: PT 12.1, INR 1.05


04/20/21 20:05: WBC 13.40 H, Hgb 10.7 L, Hct 31.7 L, Plt Count 456 H


04/20/21 20:05: Sodium 126 L, Potassium 3.9, BUN 37 H, Creatinine 3.27 H, Gl

ucose 775 H*, Magnesium 2.2, Total Bilirubin 0.2, AST 8 L, ALT 12, Alkaline 

Phosphatase 199 H, Lipase 160








<Pravin Diaz - Last Filed: 04/21/21 05:25>





- Studies


Microbiology Data (last 24 hrs): 








04/20/21 20:40   Blood  - Blood   Aerobic Blood Culture - Final


                            No growth in 5 days.


04/20/21 20:40   Blood  - Blood   Anaerobic Blood Culture - Final


                            No growth in 5 days.


04/20/21 20:25   Blood  - Blood   Aerobic Blood Culture - Final


                            No growth in 5 days.


04/20/21 20:25   Blood  - Blood   Anaerobic Blood Culture - Final


                            No growth in 5 days.








<Valeri Gardner - Last Filed: 04/26/21 06:01>





Assessment and Plan





- Plan





Assessment


UTI, bladder outlet obstruction, bilateral hydroureter and hydronephrosis


hyperglycemia, uncontrolled DM


HTN


tobacco use disorder





Plan


urology consulted, nephrology consulted


de oliveira placed, monitor I&Os


repeat renal ultrasound in 3 days


trend Cr, IVF


continue with abx, urine cultures pending, blood cultures pending


hydralazine IV as needed for BP control


nicotine patch for tobacco use 


Discharge Plan: Home


Plan to discharge in: 72 Hours





- Advance Directives


Does patient have a Living Will: No


Does patient have a Durable POA for Healthcare: No





- Code Status/Comfort Care


Code Status Assessed: Yes (full code)


Critical Care: No


Time Spent Managing Pts Care (In Minutes): 70





<Pravin Diaz - Last Filed: 04/21/21 05:25>





- Problems (Diagnosis)


(1) Acute kidney injury


Current Visit: Yes   Status: Acute   





(2) Bladder outlet obstruction


Current Visit: Yes   Status: Acute   





(3) Bilateral hydronephrosis


Current Visit: Yes   Status: Acute   





<Valeri Gardner - Last Filed: 04/26/21 06:01>


Date of Service: 04/21/21





Agree with plan of care as mentioned below





<Valeri Gardner - Last Filed: 04/26/21 06:01>

## 2021-04-21 NOTE — P.CNS
Date of Consult: 04/21/21


Reason for Consult: EROS/ CKD


Requesting Physician: Valeri Gardner


Primary Care Provider: Sadie


Chief Complaint: UTI, EROS, hydronephrosis


History of Present Illness: 





Mr. Reeves is a 39 yo M with DM and HTN here who was in his usual state of 

health until February. He says 2 weeks after starting new medications for his 

DM, he began having hematuria for 3-4 days. He was seen at [facility] and was told he 

had a severe UTI and needed IV abx but he left AMA. He has not taken any oral 

antibiotics. He says he has had night sweats, chills, bilateral flank pain, 

dizziness, frequency, incontinence, and difficulty walking. He denies urgency, 

diarrhea, constipation, nausea. He says he was taking up to 10 extra strength 

tylenol a day for his symptoms. He smokes 1ppd. CT scan showed Severe bilateral 

hydroureter, hydronephrosis in urinary bladder distention is present. Air is 

also present in the bladder, which may be related to infection or recent 

instrumentation. A bladder outlet obstruction is suspected.





Reports urinary difficulties for at least two months.  Reports taking 

intermittent Tylenol and Ibuprofen approximately 10 times per day for the pubic 

pain.





19:40 This 40 yrs old  Male presents to ER via Wheelchair with 

complaints of Leg      sheila 


      Swelling, Feet Swelling, Numbness, kidney infection.                      

                 


19:40 The patient's problem is reported as weakness, that is generalized. Onset:

The          sheila 


      symptoms/episode began/occurred 1 week(s) ago. Duration: The episode is 

continuous.         


      Context: the episode(s) was witnessed, by family. The symptoms are 

alleviated by            


      nothing. The symptoms are aggravated by nothing. Severity of symptoms: At 

their worst       


      the symptoms were mild in the emergency department the symptoms are 

unchanged.              


      Patient's baseline: Neuro: alert and fully oriented, Motor: no deficits, 

Ambulation:        


      walks without assistance, Speech: normal, normal for age. The patient has 

experienced       


      similar episodes in the past, several times.                              

                 


Allergies





No Known Allergies Allergy (Verified 04/21/21 00:25)


   





Home medications list reviewed: Yes


Home Medications: 








NK [No Home Meds]  04/21/21 








- Past Medical/Surgical History


Diabetic: Yes


-: DM


-: HTN


-: Kidney Infection





- Family History


  ** Mother


Medical History: Diabetes





  ** Father


Medical History: Diabetes





- Social History


Alcohol use: No


CD- Drugs: No


Caffeine use: No


Place of Residence: Home





Review of Systems


10-point ROS is otherwise unremarkable


General: Malaise


Genitourinary: Dysuria





Physical Examination














Temp Pulse Resp BP Pulse Ox


 


 98.4 F   103 H  18   126/88   97 


 


 04/21/21 07:59  04/21/21 07:59  04/21/21 05:33  04/21/21 07:59  04/21/21 07:59








General: Alert, Oriented x3, Cooperative


HEENT: Atraumatic


Neck: Supple


Respiratory: Clear to auscultation bilaterally


Cardiovascular: Regular rate/rhythm, Edema


Gastrointestinal: Soft and benign, Non-distended, Tenderness


Musculoskeletal: No clubbing, No contractures


Integumentary: No rashes, No cyanosis


Neurological: Normal speech


Laboratory Data (last 24 hrs)





04/20/21 20:05: PT 12.1, INR 1.05


04/20/21 20:05: WBC 13.40 H, Hgb 10.7 L, Hct 31.7 L, Plt Count 456 H


04/20/21 20:05: Sodium 126 L, Potassium 3.9, BUN 37 H, Creatinine 3.27 H, 

Glucose 775 H*, Magnesium 2.2, Total Bilirubin 0.2, AST 8 L, ALT 12, Alkaline 

Phosphatase 199 H, Lipase 160





Imagings Data: 


EXAM DESCRIPTION:  RAD - Chest Single View - 4/20/2021 8:06 pm


CLINICAL HISTORY:  SWELLING


Chest pain.


COMPARISON:  No comparisons


FINDINGS:  Portable technique limits examination quality.


Mild interstitial pulmonary edema suspected. The heart is upper limit normal in 

size with a tortuous thoracic aorta. No displaced fractures.








EXAM DESCRIPTION:  CT - Stone Protocol - 4/20/2021 8:15 pm


CLINICAL HISTORY:  Flank pain.


HEMATURIA


COMPARISON:  No comparisons


TECHNIQUE:  Axial images were obtained without oral or IV contrast. Lack of 

contrast limits solid organ and vascular assessment. The field-of-view spans the

entirety of the  system partially obscuring uppermost abdomen and lung bases. 

Coronal reformatted images were obtained and reviewed.


All CT scans are performed using dose optimization technique as appropriate and 

may include automated exposure control or mA/KV adjustment according to patient 

size.


FINDINGS:  The lower lung fields are clear.


Imaged portions of the liver and spleen show no suspicious findings on non-contr

ast imaging. The pancreas and adrenal glands are normal. No pathologic 

lymphadenopathy in the abdomen or pelvis.


Severe bilateral hydroureter and hydronephrosis is present. The urinary bladder 

is distended and contains air. No obstructing renal calculus seen.


No bowel obstruction, free air, free fluid or abscess. Normal appendix noted.Fat

containing bilateral inguinal hernias, larger on the right.


No significant bony abnormality.


IMPRESSION:  Severe bilateral hydroureter, hydronephrosis in urinary bladder 

distention is present. Air is also present in the bladder, which may be related 

to infection or recent instrumentation. A bladder outlet obstruction is 

suspected.


Conclusions/Impression: 


A/P:  Continue the current POC and Medications other than the changes listed.  

AM Labs PRN.  Recommend daily weight.  Please see the orders for complete 

details.





EROS likely due to excessive NSAIDs


CKD (Baseline CKD is unclear)


-No NSAIDs


-Continue de oliveira





Bladder outlet obstruction with BL hydronephrosis and hydroureter


-Continue de oliveira


-Recommend urology evaluation





Hyponatremia


-Continue IV NS





HTN with tachycardia


-Monitor BP





LE Edema





DM II with CKD


-Continue Lantus


-Continue RISS





Moderate malnutrition


-Encourage nutrition





Anemia in chronic illness


-Monitor H&H





Acute cystitis


-Continue Meropenem





Case reviewed with Dr. Gardner

## 2021-04-22 LAB
ALBUMIN SERPL BCP-MCNC: 2.8 G/DL (ref 3.4–5)
ALP SERPL-CCNC: 141 U/L (ref 45–117)
ALT SERPL W P-5'-P-CCNC: 14 U/L (ref 12–78)
AST SERPL W P-5'-P-CCNC: 8 U/L (ref 15–37)
BUN BLD-MCNC: 34 MG/DL (ref 7–18)
GLUCOSE SERPLBLD-MCNC: 298 MG/DL (ref 74–106)
HCT VFR BLD CALC: 36.8 % (ref 39.6–49)
LYMPHOCYTES # SPEC AUTO: 4.9 K/UL (ref 0.7–4.9)
PMV BLD: 9.4 FL (ref 7.6–11.3)
POTASSIUM SERPL-SCNC: 3.9 MMOL/L (ref 3.5–5.1)
RBC # BLD: 4.19 M/UL (ref 4.33–5.43)

## 2021-04-22 RX ADMIN — HEPARIN SODIUM SCH UNIT: 5000 INJECTION, SOLUTION INTRAVENOUS; SUBCUTANEOUS at 10:44

## 2021-04-22 RX ADMIN — SODIUM CHLORIDE SCH MLS: 0.9 INJECTION, SOLUTION INTRAVENOUS at 07:10

## 2021-04-22 RX ADMIN — Medication SCH ML: at 09:00

## 2021-04-22 RX ADMIN — Medication SCH MG: at 10:43

## 2021-04-22 RX ADMIN — INSULIN GLARGINE SCH UNITS: 100 INJECTION, SOLUTION SUBCUTANEOUS at 20:31

## 2021-04-22 RX ADMIN — MORPHINE SULFATE PRN MG: 2 INJECTION, SOLUTION INTRAMUSCULAR; INTRAVENOUS at 14:51

## 2021-04-22 RX ADMIN — Medication SCH ML: at 20:25

## 2021-04-22 RX ADMIN — SODIUM CHLORIDE SCH: 0.9 INJECTION, SOLUTION INTRAVENOUS at 16:31

## 2021-04-22 RX ADMIN — HYDROMORPHONE HYDROCHLORIDE PRN MG: 1 INJECTION, SOLUTION INTRAMUSCULAR; INTRAVENOUS; SUBCUTANEOUS at 07:10

## 2021-04-22 RX ADMIN — HUMAN INSULIN SCH UNIT: 100 INJECTION, SOLUTION SUBCUTANEOUS at 17:48

## 2021-04-22 RX ADMIN — MORPHINE SULFATE PRN MG: 2 INJECTION, SOLUTION INTRAMUSCULAR; INTRAVENOUS at 02:56

## 2021-04-22 RX ADMIN — MEROPENEM SCH MLS: 1 INJECTION INTRAVENOUS at 20:25

## 2021-04-22 RX ADMIN — SODIUM CHLORIDE SCH MLS: 0.9 INJECTION, SOLUTION INTRAVENOUS at 20:24

## 2021-04-22 RX ADMIN — HUMAN INSULIN SCH UNIT: 100 INJECTION, SOLUTION SUBCUTANEOUS at 10:46

## 2021-04-22 RX ADMIN — HUMAN INSULIN SCH UNIT: 100 INJECTION, SOLUTION SUBCUTANEOUS at 01:02

## 2021-04-22 RX ADMIN — Medication SCH: at 20:26

## 2021-04-22 RX ADMIN — NICOTINE SCH MG: 14 PATCH TRANSDERMAL at 10:45

## 2021-04-22 RX ADMIN — HEPARIN SODIUM SCH UNIT: 5000 INJECTION, SOLUTION INTRAVENOUS; SUBCUTANEOUS at 17:47

## 2021-04-22 RX ADMIN — MEROPENEM SCH MLS: 1 INJECTION INTRAVENOUS at 12:20

## 2021-04-22 RX ADMIN — HYDROMORPHONE HYDROCHLORIDE PRN MG: 1 INJECTION, SOLUTION INTRAMUSCULAR; INTRAVENOUS; SUBCUTANEOUS at 12:19

## 2021-04-22 RX ADMIN — Medication SCH: at 14:00

## 2021-04-22 RX ADMIN — MORPHINE SULFATE PRN MG: 2 INJECTION, SOLUTION INTRAMUSCULAR; INTRAVENOUS at 22:02

## 2021-04-22 RX ADMIN — HEPARIN SODIUM SCH: 5000 INJECTION, SOLUTION INTRAVENOUS; SUBCUTANEOUS at 01:00

## 2021-04-22 RX ADMIN — HYDROMORPHONE HYDROCHLORIDE PRN MG: 1 INJECTION, SOLUTION INTRAMUSCULAR; INTRAVENOUS; SUBCUTANEOUS at 19:04

## 2021-04-22 RX ADMIN — HUMAN INSULIN SCH UNIT: 100 INJECTION, SOLUTION SUBCUTANEOUS at 12:20

## 2021-04-22 RX ADMIN — HUMAN INSULIN SCH UNIT: 100 INJECTION, SOLUTION SUBCUTANEOUS at 20:38

## 2021-04-22 NOTE — P.PN
Date of Service: 04/22/21


Vital Signs











Temp Pulse Resp BP Pulse Ox


 


 96.8 F   113 H  18   127/90   96 


 


 04/22/21 16:00  04/22/21 16:00  04/22/21 19:04  04/22/21 16:00  04/22/21 19:04





Medications





Acetaminophen (Acetaminophen 500 Mg Tab)  500 mg PO Q4HP PRN


   PRN Reason: Pain scale 2-4 (Mild)


   Last Admin: 04/21/21 20:05 Dose:  500 mg


   Documented by: 


Dextrose (D50w 25 Gm/50 Ml Vial)  12.5 gm IV PRN PRN; Protocol


   PRN Reason: HYPOGLYCEMIA


Furosemide (Furosemide 40 Mg/4 Ml Vial)  40 mg IV 1X ONE


   Stop: 04/22/21 20:46


Glucagon (Glucagon 1 Mg/Vial)  1 mg IM 1X PRN; Protocol


   PRN Reason: HYPOGLYCEMIA


Heparin Sodium (Porcine) (Heparin 5000 Unit/Ml 1 Ml Vial)  5,000 unit SQ Q8HR 

[facility]


   Last Admin: 04/22/21 17:47 Dose:  5,000 unit


   Documented by: 


Hydralazine HCl (Hydralazine Hcl 20 Mg/Ml Vial)  10 mg IV Q6HP PRN


   PRN Reason: Titrate to SBP (MUST DEFINE)


Hydromorphone HCl (Hydromorphone Hcl 0.5 Mg/0.5 Ml Inj)  0.5 mg IV Q6H PRN


   PRN Reason: Pain scale 8-10 (Severe)


   Last Admin: 04/22/21 19:04 Dose:  0.5 mg


   Documented by: 


Meropenem (Merrem 1 Gm/100 Ml Ns Ivpb)  1 gm in 100 mls @ 100 mls/hr IV BID [facility]


   Last Admin: 04/22/21 20:25 Dose:  100 mls


   Documented by: 


Insulin Glargine (Insulin Glargine 100 Units/Ml)  40 units SQ BEDTIME [facility]


   Last Admin: 04/22/21 20:31 Dose:  40 units


   Documented by: 


Insulin Human Regular (Insulin -Regular Human 50 Unit/0.5 Ml Ml)  0 unit SQ ACHS

[facility]; Protocol


   Last Admin: 04/22/21 20:38 Dose:  10 unit


   Documented by: 


Morphine Sulfate (Morphine 2 Mg/Ml Syr)  2 mg IV Q4H PRN


   PRN Reason: Pain scale 5-7 (Moderate)


   Last Admin: 04/22/21 14:51 Dose:  2 mg


   Documented by: 


Nicotine (Nicotine 14 Mg/Pat)  14 mg TD DAILY [facility]


   Last Admin: 04/22/21 10:45 Dose:  14 mg


   Documented by: 


Ondansetron HCl (Ondansetron 4 Mg/2 Ml Vial)  4 mg IV Q6HP PRN


   PRN Reason: NAUSEA / VOMITING


   Last Admin: 04/21/21 03:30 Dose:  4 mg


   Documented by: 


Phenazopyridine HCl (Phenazopyridine 100mg Tab)  200 mg PO TID [facility]


   Last Admin: 04/22/21 20:26 Dose:  Not Given


   Documented by: 


Potassium Chloride (Potassium Cl Sa 10 Meq Tab)  20 meq PO 1X ONE


   Stop: 04/22/21 20:46


Sodium Chloride (Flush Normal Saline 10 Ml)  10 ml IV BID [facility]


   Last Admin: 04/22/21 20:25 Dose:  10 ml


   Documented by: 


Microbiology Results





04/20/21 20:10   Clean Catch Urine   [place] Count - Preliminary


                            >100,000 CFU/ML.


04/20/21 20:10   Clean Catch Urine    - Preliminary


04/20/21 20:40   Blood  - Blood   Aerobic Blood Culture - Preliminary


                            No growth in 24 hours.


04/20/21 20:40   Blood  - Blood   Anaerobic Blood Culture - Preliminary


                            No growth in 24 hours.


04/20/21 20:25   Blood  - Blood   Aerobic Blood Culture - Preliminary


                            No growth in 24 hours.


04/20/21 20:25   Blood  - Blood   Anaerobic Blood Culture - Preliminary


                            No growth in 24 hours.





Assessment/ Plan: 


Nephrology





No acute cardiac or pulmonary complaints.


No CP or SOB.


Fatigue and weakness.


No acute events overnight.





Vitals, medications, blood work and imaging reviewed in the chart.


General: Alert, Oriented x3, Cooperative


HEENT: Atraumatic


Neck: Supple


Respiratory: Clear to auscultation bilaterally


Cardiovascular: Regular rate/rhythm, Edema


Gastrointestinal: Soft and benign, Non-distended, Tenderness


Musculoskeletal: No clubbing, No contractures


Integumentary: No rashes, No cyanosis


Neurological: Normal speech


Laboratory Data (last 24 hrs)





04/20/21 20:05: PT 12.1, INR 1.05


04/20/21 20:05: WBC 13.40 H, Hgb 10.7 L, Hct 31.7 L, Plt Count 456 H


04/20/21 20:05: Sodium 126 L, Potassium 3.9, BUN 37 H, Creatinine 3.27 H, 

Glucose 775 H*, Magnesium 2.2, Total Bilirubin 0.2, AST 8 L, ALT 12, Alkaline 

Phosphatase 199 H, Lipase 160





Imagings Data: 


EXAM DESCRIPTION:  RAD - Chest Single View - 4/20/2021 8:06 pm


CLINICAL HISTORY:  SWELLING


Chest pain.


COMPARISON:  No comparisons


FINDINGS:  Portable technique limits examination quality.


Mild interstitial pulmonary edema suspected. The heart is upper limit normal in 

size with a tortuous thoracic aorta. No displaced fractures.








EXAM DESCRIPTION:  CT - Stone Protocol - 4/20/2021 8:15 pm


CLINICAL HISTORY:  Flank pain.


HEMATURIA


COMPARISON:  No comparisons


TECHNIQUE:  Axial images were obtained without oral or IV contrast. Lack of 

contrast limits solid organ and vascular assessment. The field-of-view spans the

entirety of the  system partially obscuring uppermost abdomen and lung bases. 

Coronal reformatted images were obtained and reviewed.


All CT scans are performed using dose optimization technique as appropriate and 

may include automated exposure control or mA/KV adjustment according to patient 

size.


FINDINGS:  The lower lung fields are clear.


Imaged portions of the liver and spleen show no suspicious findings on non-

contrast imaging. The pancreas and adrenal glands are normal. No pathologic 

lymphadenopathy in the abdomen or pelvis.


Severe bilateral hydroureter and hydronephrosis is present. The urinary bladder 

is distended and contains air. No obstructing renal calculus seen.


No bowel obstruction, free air, free fluid or abscess. Normal appendix noted.Fat

containing bilateral inguinal hernias, larger on the right.


No significant bony abnormality.


IMPRESSION:  Severe bilateral hydroureter, hydronephrosis in urinary bladder 

distention is present. Air is also present in the bladder, which may be related 

to infection or recent instrumentation. A bladder outlet obstruction is 

suspected.





Conclusions/Impression: 


A/P:  Continue the current POC and Medications other than the changes listed.  

AM Labs PRN.  Recommend daily weight.  Please see the orders for complete deta

ils.





EROS likely due to excessive NSAIDs complicated by urinary obstruction


CKD (Baseline CKD is unclear)


-No NSAIDs


-Continue de oliveira





Bladder outlet obstruction with BL hydronephrosis and hydroureter


-Continue de oliveira





Hyponatremia


-Discontinue IV NS


-Start furosemide





HTN with tachycardia


-Monitor BP





LE Edema


-Start furosemide





DM II with CKD


-Continue Lantus


-Continue RISS





Moderate malnutrition


-Encourage nutrition





Anemia in chronic illness


-Monitor H&H





Acute cystitis


-Continue Meropenem





Case reviewed with Dr. Gardner

## 2021-04-22 NOTE — CON
Reason For Consultation:  Bilateral hydronephrosis and acute urinary retention.



History Of Present Illness:  Mr. Reeves is a 40-year-old gentleman with type 2 
diabetes who presented to the emergency department 2 days ago with significant 
problems of ambulating.  Prior to this, about 2 months ago he was seen in 
another emergency department because of gross hematuria and was diagnosed with a
complicated urinary tract infection.  They wanted to admit him at that time 
explaining his infection could not be treated with oral antimicrobials, and he 
would require IV antimicrobial therapy.  He, however, refused the admission and 
went home without any antibiotics.  The blood in the urine and the dysuria that 
he had been experiencing up until that point resolved within the next 3 to 4 
days later, so he proceeded without concern about what had happened.  Of note, 
over a year prior to this, he had been noting significant difficulty of voiding 
with having to strain to void.  He never sought urologic or medical evaluation 
during that time.  Right before he came to the emergency department within the 
last few weeks, however, he noted that he thought he was urinating "well" and 
that he was having to void frequently, voiding with a strong stream, but that he
was leaking urine uncontrollably between voids or with the urge to void.  
Despite the abnormalities associated with how he was urinating, he somehow 
perceived that he was urinating better in that he was no longer having to strain
to void.  Unfortunately, his situation worsened to the point that he lost 
sensation in his arms and his legs, more specifically his fingers and his toes 
since he was admitted to the hospital within the last few days.  Prior to that, 
he denied any neuropathy even any associated with his diabetes mellitus.



Past Medical History:  As above.



Family History:  He is  and his wife is with him at the bedside.



Physical Examination:

The patient currently is lying in the bed, relatively comfortable appearing 
though indicating that he is in some degree of discomfort because of some pelvic
region pain that he says has been there since the catheter was put in place.  He
is mildly obese. 



There is no dyspnea or signs of respiratory distress. 



A urethral Newton catheter is in place with tea-colored urine. 



He is uncircumcised without any glandar lesions and the meatus is patent with 
the catheter within.



Laboratory Analyses:  Reveal a trend in the creatinine of the following; 

04/20/2021, creatinine 3.27. 

04/21/2021, creatinine 2.65. 

04/22/2021, creatinine 2.21. 



He underwent a CT scan in the emergency department, which I reviewed the images 
of directly:  Bilateral moderate to severe hydronephrosis with sausage ureters 
that extend all the way down to the ureterovesical junction.  The left ureter 
appears to be somewhat laterally displaced in terms of its implantation at the 
UVJ, but the right ureter does appear to be in typical orthotopic position near 
the trigone.  The bladder is massively distended on the CT scan and extending at
least up to the level of the umbilicus.  There is significant quantity of air in
the bladder, but since no oral contrast was provided, while there is rectum and 
colon extending along the posterior surface of the bladder, it is unclear if 
there is any potential fistulous connection on this CT scan.  No calculus 
related obstruction is appreciated along the course of the ureters or within the
kidneys themselves.



Assessment And Recommendation:  This is a 40-year-old gentleman with type 2 
diabetes and some neuropathic complications that have developed within the last 
few months, but potentially present for as long as a year, with bstructive lower
urinary symptoms, incontinence, bilateral hydronephrosis associated with a 
massively distended bladder, and a complicated UTI/cystitis with air in the 
bladder potentially secondary to anatomic obstructing lesion, neuropathic 
detrusor dysfunction, enterovesical fistula, or the consequence of chronic 
severe obstructive prostatitis. 



The Newton catheter should be left in place for 14 days minimum and he should see
me as an outpatient in the [facility] for cystoscopy and voiding trial. 



Once the organism causing his urinary tract infection is speciated and the 
sensitivities are known, he should be treated with at least 2 weeks of 
antimicrobial therapy to clear the infection. 



The cystoscopy will also evaluate the source of the gross hematuria, which he 
previously experienced as well as any anatomic obstruction that may have caused 
this degree of retention. 



Meanwhile, he would potentially benefit from starting Flomax 0.4 mg daily. 



I would be somewhat judicious about the use of the Pyridium because of its 
potential adverse renal function affect in the setting of acute kidney injury. 



Given his pelvic discomfort present at this time, consider belladonna and opiate
suppository while an inpatient, and he may benefit from oxybutynin 5 mg tablets 
by mouth every 8 hours as needed to minimize the bladder spasms he is likely 
experiencing from the catheter having decompressed a chronically over distended 
detrusor. 



Consideration will be given on follow up for pelvic imaging with rectal contrast
to evaluate for a fistula.



As previously discussed via phone, I agree with the ultrasound scheduled for 
tomorrow morning, and we look to seen that the hydronephrosis has resolved and 
that the bladder is decompressed with a catheter in place.





NUHA/MODL

DD:  04/22/2021 19:13:29   Voice ID:  [id number]

DT:  04/22/2021 21:25:21   Report ID:  [id number]

MTDD

## 2021-04-23 LAB
ALBUMIN SERPL BCP-MCNC: 2.3 G/DL (ref 3.4–5)
ALP SERPL-CCNC: 110 U/L (ref 45–117)
ALT SERPL W P-5'-P-CCNC: 12 U/L (ref 12–78)
AST SERPL W P-5'-P-CCNC: 7 U/L (ref 15–37)
BUN BLD-MCNC: 28 MG/DL (ref 7–18)
GLUCOSE SERPLBLD-MCNC: 249 MG/DL (ref 74–106)
HCT VFR BLD CALC: 32 % (ref 39.6–49)
LYMPHOCYTES # SPEC AUTO: 4.1 K/UL (ref 0.7–4.9)
PMV BLD: 9 FL (ref 7.6–11.3)
POTASSIUM SERPL-SCNC: 3.5 MMOL/L (ref 3.5–5.1)
RBC # BLD: 3.65 M/UL (ref 4.33–5.43)

## 2021-04-23 RX ADMIN — Medication SCH: at 20:46

## 2021-04-23 RX ADMIN — HUMAN INSULIN SCH UNIT: 100 INJECTION, SOLUTION SUBCUTANEOUS at 20:46

## 2021-04-23 RX ADMIN — Medication SCH: at 13:24

## 2021-04-23 RX ADMIN — HUMAN INSULIN SCH UNIT: 100 INJECTION, SOLUTION SUBCUTANEOUS at 16:19

## 2021-04-23 RX ADMIN — HYDROMORPHONE HYDROCHLORIDE PRN MG: 1 INJECTION, SOLUTION INTRAMUSCULAR; INTRAVENOUS; SUBCUTANEOUS at 01:16

## 2021-04-23 RX ADMIN — METOPROLOL TARTRATE SCH MG: 25 TABLET ORAL at 17:25

## 2021-04-23 RX ADMIN — Medication SCH ML: at 08:50

## 2021-04-23 RX ADMIN — HYDROMORPHONE HYDROCHLORIDE PRN MG: 1 INJECTION, SOLUTION INTRAMUSCULAR; INTRAVENOUS; SUBCUTANEOUS at 06:32

## 2021-04-23 RX ADMIN — MORPHINE SULFATE PRN MG: 2 INJECTION, SOLUTION INTRAMUSCULAR; INTRAVENOUS at 20:47

## 2021-04-23 RX ADMIN — HUMAN INSULIN SCH UNIT: 100 INJECTION, SOLUTION SUBCUTANEOUS at 12:42

## 2021-04-23 RX ADMIN — HEPARIN SODIUM SCH UNIT: 5000 INJECTION, SOLUTION INTRAVENOUS; SUBCUTANEOUS at 16:19

## 2021-04-23 RX ADMIN — MORPHINE SULFATE PRN MG: 2 INJECTION, SOLUTION INTRAMUSCULAR; INTRAVENOUS at 14:31

## 2021-04-23 RX ADMIN — NICOTINE SCH MG: 14 PATCH TRANSDERMAL at 08:49

## 2021-04-23 RX ADMIN — HEPARIN SODIUM SCH UNIT: 5000 INJECTION, SOLUTION INTRAVENOUS; SUBCUTANEOUS at 08:49

## 2021-04-23 RX ADMIN — HEPARIN SODIUM SCH UNIT: 5000 INJECTION, SOLUTION INTRAVENOUS; SUBCUTANEOUS at 01:15

## 2021-04-23 RX ADMIN — LOSARTAN POTASSIUM SCH MG: 50 TABLET, FILM COATED ORAL at 20:47

## 2021-04-23 RX ADMIN — Medication SCH: at 08:50

## 2021-04-23 RX ADMIN — MORPHINE SULFATE PRN MG: 2 INJECTION, SOLUTION INTRAMUSCULAR; INTRAVENOUS at 09:16

## 2021-04-23 RX ADMIN — HYDROMORPHONE HYDROCHLORIDE PRN MG: 1 INJECTION, SOLUTION INTRAMUSCULAR; INTRAVENOUS; SUBCUTANEOUS at 12:42

## 2021-04-23 RX ADMIN — MEROPENEM SCH MLS: 1 INJECTION INTRAVENOUS at 08:49

## 2021-04-23 RX ADMIN — Medication SCH ML: at 20:46

## 2021-04-23 RX ADMIN — MEROPENEM SCH MLS: 1 INJECTION INTRAVENOUS at 20:45

## 2021-04-23 RX ADMIN — MORPHINE SULFATE PRN MG: 2 INJECTION, SOLUTION INTRAMUSCULAR; INTRAVENOUS at 03:56

## 2021-04-23 RX ADMIN — INSULIN GLARGINE SCH UNITS: 100 INJECTION, SOLUTION SUBCUTANEOUS at 20:47

## 2021-04-23 RX ADMIN — HYDROMORPHONE HYDROCHLORIDE PRN MG: 1 INJECTION, SOLUTION INTRAMUSCULAR; INTRAVENOUS; SUBCUTANEOUS at 18:09

## 2021-04-23 NOTE — P.PN
Date of Service: 04/23/21


Vital Signs











Temp Pulse Resp BP Pulse Ox


 


 97.4 F   90   18   144/93 H  96 


 


 04/23/21 15:51  04/23/21 17:25  04/23/21 18:09  04/23/21 17:25  04/23/21 18:09





Medications





Acetaminophen (Acetaminophen 500 Mg Tab)  500 mg PO Q4HP PRN


   PRN Reason: Pain scale 2-4 (Mild)


   Last Admin: 04/21/21 20:05 Dose:  500 mg


   Documented by: 


Hydrocodone Bitart/Acetaminophen (Hydrocodone/Apap 10/325 Tab)  1 tab PO Q4H PRN


   PRN Reason: Pain scale 8-10 (Severe)


Dextrose (D50w 25 Gm/50 Ml Vial)  12.5 gm IV PRN PRN; Protocol


   PRN Reason: HYPOGLYCEMIA


Glucagon (Glucagon 1 Mg/Vial)  1 mg IM 1X PRN; Protocol


   PRN Reason: HYPOGLYCEMIA


Heparin Sodium (Porcine) (Heparin 5000 Unit/Ml 1 Ml Vial)  5,000 unit SQ Q8HR 

[facility]


   Last Admin: 04/23/21 16:19 Dose:  5,000 unit


   Documented by: 


Hydralazine HCl (Hydralazine Hcl 20 Mg/Ml Vial)  10 mg IV Q6HP PRN


   PRN Reason: Titrate to SBP (MUST DEFINE)


   Last Admin: 04/23/21 00:23 Dose:  10 mg


   Documented by: 


Hydrochlorothiazide (Hydrochlorothiazide 25 Mg Tab)  25 mg PO DAILY [facility]


Hydromorphone HCl (Hydromorphone Hcl 0.5 Mg/0.5 Ml Inj)  0.5 mg IV Q6H PRN


   PRN Reason: Pain scale 8-10 (Severe)


   Last Admin: 04/23/21 18:09 Dose:  0.5 mg


   Documented by: 


Meropenem (Merrem 1 Gm/100 Ml Ns Ivpb)  1 gm in 100 mls @ 100 mls/hr IV BID [facility]


   Last Admin: 04/23/21 08:49 Dose:  100 mls


   Documented by: 


Insulin Glargine (Insulin Glargine 100 Units/Ml)  40 units SQ BEDTIME [facility]


   Last Admin: 04/22/21 20:31 Dose:  40 units


   Documented by: 


Insulin Human Regular (Insulin -Regular Human 50 Unit/0.5 Ml Ml)  0 unit SQ ACHS

[facility]; Protocol


   Last Admin: 04/23/21 16:19 Dose:  10 unit


   Documented by: 


Losartan Potassium (Losartan Potassium 50 Mg Tablet)  50 mg PO BID [facility]


Metoprolol Tartrate (Metoprolol Tar 25 Mg Tab)  25 mg PO BID 6AM 6PM [facility]


   Last Admin: 04/23/21 17:25 Dose:  25 mg


   Documented by: 


Morphine Sulfate (Morphine 2 Mg/Ml Syr)  2 mg IV Q4H PRN


   PRN Reason: Pain scale 5-7 (Moderate)


   Last Admin: 04/23/21 14:31 Dose:  2 mg


   Documented by: 


Nicotine (Nicotine 14 Mg/Pat)  14 mg TD DAILY [facility]


   Last Admin: 04/23/21 08:49 Dose:  14 mg


   Documented by: 


Ondansetron HCl (Ondansetron 4 Mg/2 Ml Vial)  4 mg IV Q6HP PRN


   PRN Reason: NAUSEA / VOMITING


   Last Admin: 04/21/21 03:30 Dose:  4 mg


   Documented by: 


Oxybutynin Chloride (Oxybutynin Chloride 5 Mg Tab)  5 mg PO BID [facility]


Phenazopyridine HCl (Phenazopyridine 100mg Tab)  200 mg PO TID [facility]


   Last Admin: 04/23/21 13:24 Dose:  Not Given


   Documented by: 


Sodium Chloride (Flush Normal Saline 10 Ml)  10 ml IV BID [facility]


   Last Admin: 04/23/21 08:50 Dose:  10 ml


   Documented by: 


Microbiology Results





04/20/21 20:10   Clean Catch Urine   [place] Count - Final


                            >100,000 CFU/ML.


04/20/21 20:10   Clean Catch Urine    - Final


                            Escherichia Coli Esbl


04/20/21 20:40   Blood  - Blood   Aerobic Blood Culture - Preliminary


                            No growth in 24 hours.


04/20/21 20:40   Blood  - Blood   Anaerobic Blood Culture - Preliminary


                            No growth in 24 hours.


04/20/21 20:25   Blood  - Blood   Aerobic Blood Culture - Preliminary


                            No growth in 24 hours.


04/20/21 20:25   Blood  - Blood   Anaerobic Blood Culture - Preliminary


                            No growth in 24 hours.





Assessment/ Plan: 


Nephrology





No acute cardiac or pulmonary complaints.


No CP or SOB.


Persistent edema.


No acute events overnight.





Vitals, medications, blood work and imaging reviewed in the chart.


General: Alert, Oriented x3, Cooperative


HEENT: Atraumatic


Neck: Supple


Respiratory: Clear to auscultation bilaterally


Cardiovascular: Regular rate/rhythm, Edema


Gastrointestinal: Soft and benign, Non-distended, Tenderness


Musculoskeletal: No clubbing, No contractures


Integumentary: No rashes, No cyanosis


Neurological: Normal speech





Laboratory Data (last 24 hrs)


04/20/21 20:05: PT 12.1, INR 1.05


04/20/21 20:05: WBC 13.40 H, Hgb 10.7 L, Hct 31.7 L, Plt Count 456 H


04/20/21 20:05: Sodium 126 L, Potassium 3.9, BUN 37 H, Creatinine 3.27 H, 

Glucose 775 H*, Magnesium 2.2, Total Bilirubin 0.2, AST 8 L, ALT 12, Alkaline 

Phosphatase 199 H, Lipase 160





Imagings Data: 


EXAM DESCRIPTION:  RAD - Chest Single View - 4/20/2021 8:06 pm


CLINICAL HISTORY:  SWELLING


Chest pain.


COMPARISON:  No comparisons


FINDINGS:  Portable technique limits examination quality.


Mild interstitial pulmonary edema suspected. The heart is upper limit normal in 

size with a tortuous thoracic aorta. No displaced fractures.








EXAM DESCRIPTION:  CT - Stone Protocol - 4/20/2021 8:15 pm


CLINICAL HISTORY:  Flank pain.


HEMATURIA


COMPARISON:  No comparisons


TECHNIQUE:  Axial images were obtained without oral or IV contrast. Lack of 

contrast limits solid organ and vascular assessment. The field-of-view spans the

entirety of the  system partially obscuring uppermost abdomen and lung bases. 

Coronal reformatted images were obtained and reviewed.


All CT scans are performed using dose optimization technique as appropriate and 

may include automated exposure control or mA/KV adjustment according to patient 

size.


FINDINGS:  The lower lung fields are clear.


Imaged portions of the liver and spleen show no suspicious findings on non-

contrast imaging. The pancreas and adrenal glands are normal. No pathologic 

lymphadenopathy in the abdomen or pelvis.


Severe bilateral hydroureter and hydronephrosis is present. The urinary bladder 

is distended and contains air. No obstructing renal calculus seen.


No bowel obstruction, free air, free fluid or abscess. Normal appendix noted.Fat

containing bilateral inguinal hernias, larger on the right.


No significant bony abnormality.


IMPRESSION:  Severe bilateral hydroureter, hydronephrosis in urinary bladder 

distention is present. Air is also present in the bladder, which may be related 

to infection or recent instrumentation. A bladder outlet obstruction is 

suspected.





Conclusions/Impression: 


A/P:  Continue the current POC and Medications other than the changes listed.  

AM Labs PRN.  Recommend daily weight.  Please see the orders for complete 

details.





EROS likely due to excessive NSAIDs complicated by urinary obstruction


CKD (Baseline CKD is unclear)


-No NSAIDs


-Continue de oliveira





Bladder outlet obstruction with BL hydronephrosis and hydroureter


-Continue de oliveira





Hyponatremia resolved





Hypokalemia


-Replete oral potassium





HTN with tachycardia


-Start Losartan BID





LE Edema


-Give a dose of furosemide and amiloride.


-Start HCTZ daily





DM II with CKD


-Continue Lantus


-Continue RISS





Moderate malnutrition


-Encourage nutrition





Anemia in chronic illness


-Monitor H&H





Acute cystitis


-Continue Meropenem

## 2021-04-23 NOTE — RAD REPORT
EXAM DESCRIPTION:  US - Renal Ultrasound-Complete - 4/23/2021 10:55 am

 

CLINICAL HISTORY:  Hydronephrosis

 

COMPARISON:  April 20, 2021 cat scan

 

FINDINGS:  The right kidney measures 13 cm with a normal echotexture.

 

The left kidney measures 13 cm with a normal echotexture.

 

Marked bilateral hydronephrosis is present.

 

A Newton catheter is present within a collapsed bladder

 

IMPRESSION:  Marked bilateral hydronephrosis

## 2021-04-24 LAB
ALBUMIN SERPL BCP-MCNC: 2.4 G/DL (ref 3.4–5)
ALP SERPL-CCNC: 96 U/L (ref 45–117)
ALT SERPL W P-5'-P-CCNC: 10 U/L (ref 12–78)
AST SERPL W P-5'-P-CCNC: 9 U/L (ref 15–37)
BUN BLD-MCNC: 26 MG/DL (ref 7–18)
BUN BLD-MCNC: 26 MG/DL (ref 7–18)
GLUCOSE SERPLBLD-MCNC: 134 MG/DL (ref 74–106)
GLUCOSE SERPLBLD-MCNC: 136 MG/DL (ref 74–106)
HCT VFR BLD CALC: 29.4 % (ref 39.6–49)
LYMPHOCYTES # SPEC AUTO: 4.2 K/UL (ref 0.7–4.9)
MAGNESIUM SERPL-MCNC: 1.7 MG/DL (ref 1.8–2.4)
NT-PROBNP SERPL-MCNC: 63 PG/ML (ref ?–125)
PMV BLD: 9 FL (ref 7.6–11.3)
POTASSIUM SERPL-SCNC: 3.9 MMOL/L (ref 3.5–5.1)
POTASSIUM SERPL-SCNC: 3.9 MMOL/L (ref 3.5–5.1)
RBC # BLD: 3.35 M/UL (ref 4.33–5.43)

## 2021-04-24 RX ADMIN — OXYBUTYNIN CHLORIDE SCH MG: 5 TABLET ORAL at 20:10

## 2021-04-24 RX ADMIN — METOPROLOL TARTRATE SCH MG: 25 TABLET ORAL at 05:16

## 2021-04-24 RX ADMIN — MORPHINE SULFATE PRN MG: 2 INJECTION, SOLUTION INTRAMUSCULAR; INTRAVENOUS at 04:06

## 2021-04-24 RX ADMIN — MEROPENEM SCH MLS: 1 INJECTION INTRAVENOUS at 11:42

## 2021-04-24 RX ADMIN — HEPARIN SODIUM SCH UNIT: 5000 INJECTION, SOLUTION INTRAVENOUS; SUBCUTANEOUS at 23:44

## 2021-04-24 RX ADMIN — LOSARTAN POTASSIUM SCH MG: 50 TABLET, FILM COATED ORAL at 20:10

## 2021-04-24 RX ADMIN — METOPROLOL TARTRATE SCH: 25 TABLET ORAL at 18:00

## 2021-04-24 RX ADMIN — HYDROMORPHONE HYDROCHLORIDE PRN MG: 1 INJECTION, SOLUTION INTRAMUSCULAR; INTRAVENOUS; SUBCUTANEOUS at 00:13

## 2021-04-24 RX ADMIN — HUMAN INSULIN SCH UNIT: 100 INJECTION, SOLUTION SUBCUTANEOUS at 21:00

## 2021-04-24 RX ADMIN — Medication SCH: at 21:00

## 2021-04-24 RX ADMIN — HEPARIN SODIUM SCH UNIT: 5000 INJECTION, SOLUTION INTRAVENOUS; SUBCUTANEOUS at 00:12

## 2021-04-24 RX ADMIN — HEPARIN SODIUM SCH: 5000 INJECTION, SOLUTION INTRAVENOUS; SUBCUTANEOUS at 17:00

## 2021-04-24 RX ADMIN — MORPHINE SULFATE PRN MG: 2 INJECTION, SOLUTION INTRAMUSCULAR; INTRAVENOUS at 20:10

## 2021-04-24 RX ADMIN — HYDROMORPHONE HYDROCHLORIDE PRN MG: 1 INJECTION, SOLUTION INTRAMUSCULAR; INTRAVENOUS; SUBCUTANEOUS at 05:16

## 2021-04-24 RX ADMIN — INSULIN GLARGINE SCH UNITS: 100 INJECTION, SOLUTION SUBCUTANEOUS at 21:00

## 2021-04-24 RX ADMIN — Medication SCH: at 14:00

## 2021-04-24 RX ADMIN — MEROPENEM SCH MLS: 1 INJECTION INTRAVENOUS at 20:11

## 2021-04-24 RX ADMIN — OXYBUTYNIN CHLORIDE SCH MG: 5 TABLET ORAL at 11:05

## 2021-04-24 RX ADMIN — HUMAN INSULIN SCH: 100 INJECTION, SOLUTION SUBCUTANEOUS at 11:30

## 2021-04-24 RX ADMIN — HEPARIN SODIUM SCH: 5000 INJECTION, SOLUTION INTRAVENOUS; SUBCUTANEOUS at 09:00

## 2021-04-24 RX ADMIN — HUMAN INSULIN SCH: 100 INJECTION, SOLUTION SUBCUTANEOUS at 07:30

## 2021-04-24 RX ADMIN — LOSARTAN POTASSIUM SCH MG: 50 TABLET, FILM COATED ORAL at 11:05

## 2021-04-24 RX ADMIN — NICOTINE SCH MG: 14 PATCH TRANSDERMAL at 11:05

## 2021-04-24 RX ADMIN — HYDROMORPHONE HYDROCHLORIDE PRN MG: 1 INJECTION, SOLUTION INTRAMUSCULAR; INTRAVENOUS; SUBCUTANEOUS at 11:43

## 2021-04-24 RX ADMIN — HYDROMORPHONE HYDROCHLORIDE PRN MG: 1 INJECTION, SOLUTION INTRAMUSCULAR; INTRAVENOUS; SUBCUTANEOUS at 19:09

## 2021-04-24 RX ADMIN — Medication SCH ML: at 09:00

## 2021-04-24 RX ADMIN — HUMAN INSULIN SCH UNIT: 100 INJECTION, SOLUTION SUBCUTANEOUS at 16:30

## 2021-04-24 RX ADMIN — Medication SCH MG: at 11:02

## 2021-04-25 LAB
BUN BLD-MCNC: 24 MG/DL (ref 7–18)
GLUCOSE SERPLBLD-MCNC: 185 MG/DL (ref 74–106)
POTASSIUM SERPL-SCNC: 4.1 MMOL/L (ref 3.5–5.1)

## 2021-04-25 RX ADMIN — HYDROMORPHONE HYDROCHLORIDE PRN MG: 1 INJECTION, SOLUTION INTRAMUSCULAR; INTRAVENOUS; SUBCUTANEOUS at 12:07

## 2021-04-25 RX ADMIN — HYDROMORPHONE HYDROCHLORIDE PRN MG: 1 INJECTION, SOLUTION INTRAMUSCULAR; INTRAVENOUS; SUBCUTANEOUS at 00:15

## 2021-04-25 RX ADMIN — INSULIN GLARGINE SCH UNITS: 100 INJECTION, SOLUTION SUBCUTANEOUS at 20:12

## 2021-04-25 RX ADMIN — MORPHINE SULFATE PRN MG: 2 INJECTION, SOLUTION INTRAMUSCULAR; INTRAVENOUS at 16:35

## 2021-04-25 RX ADMIN — HEPARIN SODIUM SCH UNIT: 5000 INJECTION, SOLUTION INTRAVENOUS; SUBCUTANEOUS at 08:03

## 2021-04-25 RX ADMIN — MORPHINE SULFATE PRN MG: 2 INJECTION, SOLUTION INTRAMUSCULAR; INTRAVENOUS at 22:55

## 2021-04-25 RX ADMIN — OXYBUTYNIN CHLORIDE SCH MG: 5 TABLET ORAL at 20:13

## 2021-04-25 RX ADMIN — HUMAN INSULIN SCH: 100 INJECTION, SOLUTION SUBCUTANEOUS at 07:30

## 2021-04-25 RX ADMIN — OXYBUTYNIN CHLORIDE SCH MG: 5 TABLET ORAL at 08:02

## 2021-04-25 RX ADMIN — HUMAN INSULIN SCH UNIT: 100 INJECTION, SOLUTION SUBCUTANEOUS at 12:02

## 2021-04-25 RX ADMIN — Medication SCH: at 08:02

## 2021-04-25 RX ADMIN — HYDROMORPHONE HYDROCHLORIDE PRN MG: 1 INJECTION, SOLUTION INTRAMUSCULAR; INTRAVENOUS; SUBCUTANEOUS at 20:13

## 2021-04-25 RX ADMIN — MEROPENEM SCH MLS: 1 INJECTION INTRAVENOUS at 08:02

## 2021-04-25 RX ADMIN — MORPHINE SULFATE PRN MG: 2 INJECTION, SOLUTION INTRAMUSCULAR; INTRAVENOUS at 03:37

## 2021-04-25 RX ADMIN — HEPARIN SODIUM SCH UNIT: 5000 INJECTION, SOLUTION INTRAVENOUS; SUBCUTANEOUS at 23:39

## 2021-04-25 RX ADMIN — Medication SCH ML: at 20:13

## 2021-04-25 RX ADMIN — LOSARTAN POTASSIUM SCH MG: 50 TABLET, FILM COATED ORAL at 08:01

## 2021-04-25 RX ADMIN — HUMAN INSULIN SCH UNIT: 100 INJECTION, SOLUTION SUBCUTANEOUS at 16:34

## 2021-04-25 RX ADMIN — Medication SCH: at 13:22

## 2021-04-25 RX ADMIN — HYDROMORPHONE HYDROCHLORIDE PRN MG: 1 INJECTION, SOLUTION INTRAMUSCULAR; INTRAVENOUS; SUBCUTANEOUS at 05:15

## 2021-04-25 RX ADMIN — LOSARTAN POTASSIUM SCH MG: 50 TABLET, FILM COATED ORAL at 20:12

## 2021-04-25 RX ADMIN — METOPROLOL TARTRATE SCH MG: 25 TABLET ORAL at 05:15

## 2021-04-25 RX ADMIN — HEPARIN SODIUM SCH UNIT: 5000 INJECTION, SOLUTION INTRAVENOUS; SUBCUTANEOUS at 16:35

## 2021-04-25 RX ADMIN — MORPHINE SULFATE PRN MG: 2 INJECTION, SOLUTION INTRAMUSCULAR; INTRAVENOUS at 07:59

## 2021-04-25 RX ADMIN — MEROPENEM SCH MLS: 1 INJECTION INTRAVENOUS at 20:13

## 2021-04-25 RX ADMIN — METOPROLOL TARTRATE SCH MG: 25 TABLET ORAL at 16:36

## 2021-04-25 RX ADMIN — NICOTINE SCH MG: 14 PATCH TRANSDERMAL at 08:01

## 2021-04-25 RX ADMIN — HUMAN INSULIN SCH UNIT: 100 INJECTION, SOLUTION SUBCUTANEOUS at 20:12

## 2021-04-25 NOTE — PN
Date of Progress Note:  04/25/2021



Subjective:  The patient is seen at bedside.  No events reported.  Newton catheter has been functionin
g well.  Denies any fevers, chills, chest pain, shortness of breath, nausea, vomiting, or diarrhea.  
He continues to have some mild suprapubic pain.



Objective:  Vital Signs:  Blood pressure 125/73, pulse 90, temperature 98.5, blood pressure has been 
decreasing from 140s, 150s, down to 160s, initially down to the 110s, 120s, 130s. 

General:  Obese, no acute distress. 

Heart:  Regular rate and rhythm.  No murmurs, rubs, or gallops. 

Lungs:  Grossly clear. 

Abdomen:  Soft with mild suprapubic tenderness. 

Extremities:  Trace to 1+ edema. 

:  Newton catheter with yellow urine with some sediment noted in the tube.  There is not any sharon h
ematuria.



Laboratory Data:  CBC shows hemoglobin 9.9, hematocrit 29.4, that was from April 24th.  Electrolytes 
today show potassium 4.1, chloride 104, BUN and creatinine 24/1.7, and glucose of 185.  Calcium is 8.
5.



Current Medications:  Reviewed.  The patient did receive Lasix yesterday.  He is on hydrochlorothiazi
de, intermittent analgesia with Dilaudid.  He is also on losartan 50 b.i.d.  Remainder medications we
re reviewed.



Impression:  

1.Acute kidney injury and severe obstructive nephropathy.

2.Electrolyte imbalance.

3.Suprapubic pain.

4.Urinary tract infection.

5.Uncontrolled diabetes mellitus.



Plan:  The patient's renal function does continue to improve.  He does have polyuria in the setting o
f postobstructive diuresis with over 3 L of urine output.  We will need to closely monitor electrolyt
es.  Blood pressure is coming down and we need to closely monitor the patient's renal function as wel
l as blood pressure trend with concurrent use of diuretics.  The patient may need maintenance IV flui
ds and that will be based on the further 24 hour output and hemodynamics as mentioned. 



Avoid NSAIDs without any contrast. 



Urology input was appreciated and Pyridium was discontinued.





SE/MODL

DD:  04/25/2021 16:24:40Voice ID:  [id number]

DT:  04/25/2021 20:12:54Report ID:  [id number]

## 2021-04-25 NOTE — RAD REPORT
EXAM DESCRIPTION:  US - Renal Ultrasound-Complete - 4/25/2021 9:07 pm

 

CLINICAL HISTORY:  Hydronephrosis

 

COMPARISON:  April 23, 2021

 

FINDINGS:  The right kidney measures 13 cm with a normal echotexture.

 

The left kidney measures 13 cm with a normal echotexture.

 

Marked bilateral hydronephrosis is without significant change.

 

The bladder is collapsed presumably secondary to a Newton catheter in place

 

IMPRESSION:  No significant change in marked bilateral hydronephrosis

## 2021-04-25 NOTE — PN
This was a telephone consultation with the patient who has been admitted to the 
hospital for the last several days after presenting to the Emergency Department 
with pelvic pain, inability to ambulate, associated with a massively 
overdistended bladder, and ESBL producing organism causing infection, and 
bilateral hydronephrosis associated with acute kidney injury.  As the patient 
had been dealing with this for over 2 months associated with gross hematuria, 
the initial plan was urethral Newton catheter decompression with renal ultrasound
performed intervally to assess for resolution of the hydronephrosis.  After an 
initial 2 days, the hydronephrosis failed to resolve, but because of the severe 
cystitis associated with his infection, I recommended an additional few days of 
antimicrobial therapy to allow the thickening of the detrusor, which may result 
in ureterovesical junction obstruction, to resolve and then repeat the 
ultrasound.  Unfortunately, after now 5 days or so, despite effective or what 
should be effective antimicrobial therapy with the meropenem, the bilateral 
hydronephrosis persists despite him having a completely decompressed bladder and
a Newton catheter verified in place on ultrasound.  As a result, I contacted the 
patient by phone to discuss the need for operative intervention and 
decompression of the hydronephrotic kidneys.  I counseled him that there were 2 
options for management of his obstruction:

1.   Either place bilateral ureteral stents or.

2.   Place bilateral externalized nephrostomy tubes. 



I explained that while there might be an attempt to place stents, sometimes the 
degree of anatomic disruption associated with the severe cystitis can be so 
severe that the ureteral orifices are not visualizable, and in that 
circumstance, it may be impossible to place stents.  He would then be required 
to have nephrostomy tubes placed to affect adequate decompression of his 
obstructive upper tracts.  I explained that if we were able to successfully 
place stents bilaterally, he would need to carefully understand that the stents 
were foreign bodies and had a limited timeframe that they could remain in situ 
without becoming encrusted, like barnacles on a ship, and become difficult to 
remove, resulting in a significantly increased risk of injury or anatomic 
disruption.  



As such, I counseled him that if he felt for any reason that he would not be 
able to comply with recommended followup as an outpatient, where we would 
determine the next management steps for the stents, the better course would be 
to place bilateral externalized nephrostomy tubes where the risk of such 
encrustation is not nearly as significant.  He explained that he would much 
rather have the ureteral stents in place and that he understood that he 
absolutely would need to follow up in order to determine next steps in 
management and ultimately have the stents hopefully removed.  



As such, we agree that he would be scheduled tomorrow for cystoscopy and 
bilateral ureteral stent placement with the possibility that percutaneous 
nephrostomy tubes would be placed if we were unable to place the stents.  I 
explained that this was necessary because his renal function, while it had 
improved, had seemed to plateau now associated with the ongoing hydronephrosis, 
which would be suggestive of the potential for ongoing obstruction.  We will 
attempt to arrange a MAG3 Lasix renogram to definitively demonstrate the 
functional obstruction prior to surgery tomorrow given the possibility the 
patient's kidneys may simply be dysplastic or chronically overdistended and thus
evidence hydronephrosis in the absence of definitive obstruction.  While I think
this is unlikely and we will plan to proceed with operative evaluation as he 
requires a cystoscopy for his gross hematuria anyway and to decipher the origin 
of his complicated urinary tract infection, if we are able to obtain the nuclear
medicine study first, that would be of benefit.  I counseled the patient for 
several minutes via telephone.  He expressed understanding at each phase of the 
process, as well as the absolute need for followup.



Addendum 4/26/21: I reviewed the imaging and tracings associated with the MAG3 
renogram completed this morning. The T1/2 listed in the tracings did not equate 
with the impression given in the report, and after discussion with the 
radiologist, it was clear the error in that calculation. My interpretation: 
While initial prompt drainage does indeed occur resulting in rapid clearance of 
tracer, ultimately, the tracer simply accumulates within the pelvis and the true
T1/2 is never reached during the time course of this study. 

Assessment: While this pattern may be seen in the setting of chronic 
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 
systems occurs and fails to shrink/resolve back to normal - even once the outlet
obstruction has been resolved, it remains incumbent to eliminate the possibility
of ongoing obstruction. Hence, cystoscopy and bilateral ureteral stents or 
percutaneous nephrostomy tubes is yet recommended. We will proceed as planned. 
Case scheduled as an add-on for today.



NUHA/ANT

DD:  04/25/2021 21:57:31   Voice ID:  [id number]

DT:  04/25/2021 22:23:12   Report ID:  [id number]

[facility]

## 2021-04-25 NOTE — P.PN
Date of Service: 04/24/21








Subjective


Abdominal pain is much improved.  States that the medication for bladder spasm 

as helped him.  Blood sugars are improved as well





Review of Systems


10-point ROS is otherwise unremarkable





Physical Examination





- Vital Signs


Reviewed





- Physical Exam


General: Alert, In no apparent distress, Oriented x3


Respiratory: Clear to auscultation bilaterally, Normal air movement


Cardiovascular: Regular rate/rhythm, Normal S1 S2


Gastrointestinal: Normal bowel sounds, soft, nontender, nondistended


Musculoskeletal: No tenderness, Swelling; swelling remains


Neurological: Normal speech, Normal tone, Normal affect








Assessment & Plan





- Problems (Diagnosis)


(1) Acute kidney injury


Current Visit: Yes   Status: Acute   





(2) Bladder outlet obstruction


Current Visit: Yes   Status: Acute   





(3) Bilateral hydronephrosis


Current Visit: Yes   Status: Acute   





(3) type 2 diabetes


Current Visit: Yes   Status: Acute  


- Plan





Plan:


Continue with plan of care as mentioned below


1. Gentle diuresing


2. Monitor renal function


3. Urology consultation appreciated; repeat renal ultrasound tomorrow.  If 

persistent hydronephrosis than a possible J stent will be placed; if 

hydronephrosis improved then will plan to Dc home with Newton catheter and 

outpatient follow-up


4. Strict input and output


5. Strict blood sugar control


6. GI and DVT prophylaxis

## 2021-04-25 NOTE — P.PN
Subjective


Date of Service: 04/22/21





Patient still having extreme amount of pain. Clinically not really feeling any 

better.  Continue with current management in await  urology consultation





Review of Systems


10-point ROS is otherwise unremarkable





Physical Examination





- Vital Signs


Temperature: 97.0 F


Blood Pressure: 131/72


Pulse: 82


Respirations: 20


Pulse Ox (%): 97





- Physical Exam


General: Alert, In no apparent distress, Oriented x3


HEENT: Atraumatic, PERRLA, EOMI


Neck: Supple, JVD not distended


Respiratory: Clear to auscultation bilaterally, Normal air movement


Cardiovascular: Regular rate/rhythm, Normal S1 S2


Gastrointestinal: Normal bowel sounds, Tenderness (Flank tenderness and 

suprapubic tenderness)


Musculoskeletal: No tenderness, Swelling


Integumentary: No rashes


Neurological: Normal speech, Normal tone, Normal affect


Lymphatics: No axilla or inguinal lymphadenopathy





- Studies


Medications List Reviewed: Yes





Assessment & Plan





- Problems (Diagnosis)


(1) Acute kidney injury


Current Visit: Yes   Status: Acute   





(2) Bladder outlet obstruction


Current Visit: Yes   Status: Acute   





(3) Bilateral hydronephrosis


Current Visit: Yes   Status: Acute   





- Plan





Plan:


1. Continue with gentle hydration


2. Monitor renal function


3. Urology consultation


4. Strict input and output


5. Strict blood sugar control


6. GI and DVT prophylaxis 


Discharge Plan: Home


Plan to discharge in: Greater than 2 days





- Advance Directives


Does patient have a Living Will: No


Does patient have a Durable POA for Healthcare: No





- Code Status/Comfort Care


Code Status Assessed: Yes


Code Status: Full Code


Critical Care: No


Time Spent Managing PTS Care (In Minutes): 35

## 2021-04-25 NOTE — P.PN
Date of Service: 04/23/21








Subjective


Spoke with Urology and will need repeat ultrasound on Sunday.  May need J stent 

placement.  This will be done on Monday if still has hydronephrosis.  Patient 

still complaining of pain. Medication given for bladder spasms





Review of Systems


10-point ROS is otherwise unremarkable





Physical Examination





- Vital Signs


Reviewed





- Physical Exam


General: Alert, In no apparent distress, Oriented x3


Respiratory: Clear to auscultation bilaterally, Normal air movement


Cardiovascular: Regular rate/rhythm, Normal S1 S2


Gastrointestinal: Normal bowel sounds, Tenderness (Flank tenderness and 

suprapubic tenderness)


Musculoskeletal: No tenderness, Swelling


Neurological: Normal speech, Normal tone, Normal affect








Assessment & Plan





- Problems (Diagnosis)


(1) Acute kidney injury


Current Visit: Yes   Status: Acute   





(2) Bladder outlet obstruction


Current Visit: Yes   Status: Acute   





(3) Bilateral hydronephrosis


Current Visit: Yes   Status: Acute   





- Plan





Plan:


Continue with plan of care as mentioned below


1. Volume status is sufficient.  Diurese gently.  


2. Monitor renal function


3. Urology consultation appreciated; repeat renal ultrasound Sunday.  If 

persistent hydronephrosis than a possible J stent will be placed; if 

hydronephrosis improved then will plan to Dc home with Newton catheter and 

outpatient follow-up


4. Strict input and output


5. Strict blood sugar control


6. GI and DVT prophylaxis

## 2021-04-26 LAB
BUN BLD-MCNC: 24 MG/DL (ref 7–18)
GLUCOSE SERPLBLD-MCNC: 183 MG/DL (ref 74–106)
MAGNESIUM SERPL-MCNC: 1.6 MG/DL (ref 1.8–2.4)
POTASSIUM SERPL-SCNC: 3.8 MMOL/L (ref 3.5–5.1)

## 2021-04-26 PROCEDURE — 0T788DZ DILATION OF BILATERAL URETERS WITH INTRALUMINAL DEVICE, VIA NATURAL OR ARTIFICIAL OPENING ENDOSCOPIC: ICD-10-PCS

## 2021-04-26 PROCEDURE — BT141ZZ FLUOROSCOPY OF KIDNEYS, URETERS AND BLADDER USING LOW OSMOLAR CONTRAST: ICD-10-PCS

## 2021-04-26 RX ADMIN — NICOTINE SCH MG: 14 PATCH TRANSDERMAL at 10:39

## 2021-04-26 RX ADMIN — HEPARIN SODIUM SCH: 5000 INJECTION, SOLUTION INTRAVENOUS; SUBCUTANEOUS at 09:00

## 2021-04-26 RX ADMIN — HYDROMORPHONE HYDROCHLORIDE PRN MG: 1 INJECTION, SOLUTION INTRAMUSCULAR; INTRAVENOUS; SUBCUTANEOUS at 10:41

## 2021-04-26 RX ADMIN — METOPROLOL TARTRATE SCH: 25 TABLET ORAL at 05:12

## 2021-04-26 RX ADMIN — HYDROMORPHONE HYDROCHLORIDE PRN MG: 1 INJECTION, SOLUTION INTRAMUSCULAR; INTRAVENOUS; SUBCUTANEOUS at 23:04

## 2021-04-26 RX ADMIN — OXYBUTYNIN CHLORIDE SCH MG: 5 TABLET ORAL at 10:42

## 2021-04-26 RX ADMIN — MORPHINE SULFATE PRN MG: 2 INJECTION, SOLUTION INTRAMUSCULAR; INTRAVENOUS at 05:12

## 2021-04-26 RX ADMIN — Medication SCH: at 21:00

## 2021-04-26 RX ADMIN — HUMAN INSULIN SCH: 100 INJECTION, SOLUTION SUBCUTANEOUS at 11:30

## 2021-04-26 RX ADMIN — INSULIN GLARGINE SCH UNITS: 100 INJECTION, SOLUTION SUBCUTANEOUS at 23:22

## 2021-04-26 RX ADMIN — HUMAN INSULIN SCH: 100 INJECTION, SOLUTION SUBCUTANEOUS at 16:30

## 2021-04-26 RX ADMIN — HUMAN INSULIN SCH UNIT: 100 INJECTION, SOLUTION SUBCUTANEOUS at 23:20

## 2021-04-26 RX ADMIN — METOPROLOL TARTRATE SCH MG: 25 TABLET ORAL at 23:23

## 2021-04-26 RX ADMIN — MORPHINE SULFATE PRN MG: 2 INJECTION, SOLUTION INTRAMUSCULAR; INTRAVENOUS at 13:00

## 2021-04-26 RX ADMIN — HYDROMORPHONE HYDROCHLORIDE PRN MG: 1 INJECTION, SOLUTION INTRAMUSCULAR; INTRAVENOUS; SUBCUTANEOUS at 01:35

## 2021-04-26 RX ADMIN — LOSARTAN POTASSIUM SCH MG: 50 TABLET, FILM COATED ORAL at 10:40

## 2021-04-26 RX ADMIN — MEROPENEM SCH MLS: 1 INJECTION INTRAVENOUS at 10:42

## 2021-04-26 RX ADMIN — MEROPENEM SCH MLS: 1 INJECTION INTRAVENOUS at 23:28

## 2021-04-26 RX ADMIN — HEPARIN SODIUM SCH: 5000 INJECTION, SOLUTION INTRAVENOUS; SUBCUTANEOUS at 17:00

## 2021-04-26 RX ADMIN — Medication SCH ML: at 09:00

## 2021-04-26 RX ADMIN — LOSARTAN POTASSIUM SCH MG: 50 TABLET, FILM COATED ORAL at 23:22

## 2021-04-26 RX ADMIN — OXYBUTYNIN CHLORIDE SCH MG: 5 TABLET ORAL at 23:22

## 2021-04-26 RX ADMIN — HUMAN INSULIN SCH: 100 INJECTION, SOLUTION SUBCUTANEOUS at 07:30

## 2021-04-26 NOTE — P.PN
Date of Service: 04/26/21


Vital Signs











Temp Pulse Resp BP Pulse Ox


 


 97 F   83   16   141/82 H  95 


 


 04/26/21 20:00  04/26/21 19:03  04/26/21 19:03  04/26/21 19:03  04/26/21 15:47





Medications





Acetaminophen (Acetaminophen 500 Mg Tab)  500 mg PO Q4HP PRN


   PRN Reason: Pain scale 2-4 (Mild)


   Last Admin: 04/21/21 20:05 Dose:  500 mg


   Documented by: 


Hydrocodone Bitart/Acetaminophen (Hydrocodone/Apap 10/325 Tab)  1 tab PO Q4H PRN


   PRN Reason: Pain scale 8-10 (Severe)


Dextrose (D50w 25 Gm/50 Ml Vial)  12.5 gm IV PRN PRN; Protocol


   PRN Reason: HYPOGLYCEMIA


Glucagon (Glucagon 1 Mg/Vial)  1 mg IM 1X PRN; Protocol


   PRN Reason: HYPOGLYCEMIA


Heparin Sodium (Porcine) (Heparin 5000 Unit/Ml 1 Ml Vial)  5,000 unit SQ Q8HR 

[facility]


   Last Admin: 04/26/21 17:00 Dose:  Not Given


   Documented by: 


Hydralazine HCl (Hydralazine Hcl 20 Mg/Ml Vial)  10 mg IV Q6HP PRN


   PRN Reason: Titrate to SBP (MUST DEFINE)


   Last Admin: 04/23/21 00:23 Dose:  10 mg


   Documented by: 


Hydrochlorothiazide (Hydrochlorothiazide 25 Mg Tab)  25 mg PO DAILY [facility]


   Last Admin: 04/26/21 10:40 Dose:  25 mg


   Documented by: 


Hydromorphone HCl (Hydromorphone Hcl 0.5 Mg/0.5 Ml Inj)  0.5 mg IV Q6H PRN


   PRN Reason: Pain scale 8-10 (Severe)


   Last Admin: 04/26/21 10:41 Dose:  0.5 mg


   Documented by: 


Meropenem (Merrem 1 Gm/100 Ml Ns Ivpb)  1 gm in 100 mls @ 100 mls/hr IV BID [facility]


   Last Admin: 04/26/21 10:42 Dose:  100 mls


   Documented by: 


Insulin Glargine (Insulin Glargine 100 Units/Ml)  40 units SQ BEDTIME [facility]


   Last Admin: 04/25/21 20:12 Dose:  40 units


   Documented by: 


Insulin Human Regular (Insulin -Regular Human 50 Unit/0.5 Ml Ml)  0 unit SQ ACHS

[facility]; Protocol


   Last Admin: 04/26/21 16:30 Dose:  Not Given


   Documented by: 


Losartan Potassium (Losartan Potassium 50 Mg Tablet)  50 mg PO BID [facility]


   Last Admin: 04/26/21 10:40 Dose:  50 mg


   Documented by: 


Metoprolol Tartrate (Metoprolol Tar 25 Mg Tab)  25 mg PO BID 6AM 6PM [facility]


   Last Admin: 04/26/21 05:12 Dose:  Not Given


   Documented by: 


Morphine Sulfate (Morphine 2 Mg/Ml Syr)  2 mg IV Q4H PRN


   PRN Reason: Pain scale 5-7 (Moderate)


   Last Admin: 04/26/21 13:00 Dose:  2 mg


   Documented by: 


Nicotine (Nicotine 14 Mg/Pat)  14 mg TD DAILY [facility]


   Last Admin: 04/26/21 10:39 Dose:  14 mg


   Documented by: 


Ondansetron HCl (Ondansetron 4 Mg/2 Ml Vial)  4 mg IV Q6HP PRN


   PRN Reason: NAUSEA / VOMITING


   Last Admin: 04/21/21 03:30 Dose:  4 mg


   Documented by: 


Oxybutynin Chloride (Oxybutynin Chloride 5 Mg Tab)  5 mg PO BID [facility]


   Last Admin: 04/26/21 10:42 Dose:  5 mg


   Documented by: 


Sodium Chloride (Flush Normal Saline 10 Ml)  10 ml IV BID [facility]


   Last Admin: 04/26/21 09:00 Dose:  10 ml


   Documented by: 


Microbiology Results





04/20/21 20:40   Blood  - Blood   Aerobic Blood Culture - Final


                            No growth in 5 days.


04/20/21 20:40   Blood  - Blood   Anaerobic Blood Culture - Final


                            No growth in 5 days.


04/20/21 20:25   Blood  - Blood   Aerobic Blood Culture - Final


                            No growth in 5 days.


04/20/21 20:25   Blood  - Blood   Anaerobic Blood Culture - Final


                            No growth in 5 days.


04/20/21 20:10   Clean Catch Urine   Solo Count - Final


                            >100,000 CFU/ML.


04/20/21 20:10   Clean Catch Urine    - Final


                            Escherichia Coli Esbl





Assessment/ Plan: 


Nephrology





No acute cardiac or pulmonary complaints.


No CP or SOB.


Edema improving.


Plan for nephrostomy tubes today.


No acute events overnight.





Vitals, medications, blood work and imaging reviewed in the chart.


General: Alert, Oriented x3, Cooperative


HEENT: Atraumatic


Neck: Supple


Respiratory: Clear to auscultation bilaterally


Cardiovascular: Regular rate/rhythm, Edema


Gastrointestinal: Soft and benign, Non-distended, Tenderness


Musculoskeletal: No clubbing, No contractures


Integumentary: No rashes, No cyanosis


Neurological: Normal speech





Laboratory Data (last 24 hrs)


04/20/21 20:05: PT 12.1, INR 1.05


04/20/21 20:05: WBC 13.40 H, Hgb 10.7 L, Hct 31.7 L, Plt Count 456 H


04/20/21 20:05: Sodium 126 L, Potassium 3.9, BUN 37 H, Creatinine 3.27 H, 

Glucose 775 H*, Magnesium 2.2, Total Bilirubin 0.2, AST 8 L, ALT 12, Alkaline 

Phosphatase 199 H, Lipase 160





Imagings Data: 


EXAM DESCRIPTION:  RAD - Chest Single View - 4/20/2021 8:06 pm


CLINICAL HISTORY:  SWELLING


Chest pain.


COMPARISON:  No comparisons


FINDINGS:  Portable technique limits examination quality.


Mild interstitial pulmonary edema suspected. The heart is upper limit normal in 

size with a tortuous thoracic aorta. No displaced fractures.








EXAM DESCRIPTION:  CT - Stone Protocol - 4/20/2021 8:15 pm


CLINICAL HISTORY:  Flank pain.


HEMATURIA


COMPARISON:  No comparisons


TECHNIQUE:  Axial images were obtained without oral or IV contrast. Lack of 

contrast limits solid organ and vascular assessment. The field-of-view spans the

entirety of the  system partially obscuring uppermost abdomen and lung bases. 

Coronal reformatted images were obtained and reviewed.


All CT scans are performed using dose optimization technique as appropriate and 

may include automated exposure control or mA/KV adjustment according to patient 

size.


FINDINGS:  The lower lung fields are clear.


Imaged portions of the liver and spleen show no suspicious findings on non-

contrast imaging. The pancreas and adrenal glands are normal. No pathologic 

lymphadenopathy in the abdomen or pelvis.


Severe bilateral hydroureter and hydronephrosis is present. The urinary bladder 

is distended and contains air. No obstructing renal calculus seen.


No bowel obstruction, free air, free fluid or abscess. Normal appendix noted.Fat

containing bilateral inguinal hernias, larger on the right.


No significant bony abnormality.


IMPRESSION:  Severe bilateral hydroureter, hydronephrosis in urinary bladder 

distention is present. Air is also present in the bladder, which may be related 

to infection or recent instrumentation. A bladder outlet obstruction is 

suspected.





Conclusions/Impression: 


A/P:  Continue the current POC and Medications other than the changes listed.  

AM Labs PRN.  Recommend daily weight.  Please see the orders for complete 

details.





EROS likely due to excessive NSAIDs complicated by urinary obstruction


CKD (Baseline CKD is unclear)


-No NSAIDs


-Continue de oliveira





Bladder outlet obstruction with BL hydronephrosis and hydroureter


-Continue de oliveira


-Follow up with urology





Hyponatremia resolved





Hypokalemia


-Replete oral potassium





HTN with tachycardia


-Continue Losartan BID





LE Edema


-Continue HCTZ





DM II with CKD


-Continue Lantus


-Continue RISS





Moderate malnutrition


-Encourage nutrition





Anemia in chronic illness


-Monitor H&H





Acute cystitis


-Continue Meropenem

## 2021-04-26 NOTE — RAD REPORT
EXAM DESCRIPTION:

RAD - Cystography - 4/26/2021 6:34 pm

 

CLINICAL HISTORY:  Abdominal pain

 

Bilateral hydronephrosis

 

FINDINGS:  Twenty-three fluoroscopic spot images obtained. Fluoroscopy time 1.1 minutes

 

Both ureters were cannulated and contrast administered. Subsequently bilateral ureteral stents were p
laced. Examination was performed by Dr Gallardo

## 2021-04-26 NOTE — P.PN
Date of Service: 04/25/21








Subjective


Renal ultrasound with persistent hydronephrosis.  Urology counseled with patient

and planned for J stent tomorrow.  If unable to do cystoscopy secondary to 

anatomic issues then nephrostomy tube may be placed.





Review of Systems


10-point ROS is otherwise unremarkable





Physical Examination





- Vital Signs


Reviewed





- Physical Exam


General: Alert, In no apparent distress, Oriented x3


Respiratory: Clear to auscultation bilaterally, Normal air movement


Cardiovascular: Regular rate/rhythm, Normal S1 S2


Gastrointestinal: Normal bowel sounds, soft, nontender, nondistended


Musculoskeletal: No tenderness, Swelling; swelling remains


Neurological: Normal speech, Normal tone, Normal affect








Assessment & Plan





- Problems (Diagnosis)


(1) Acute kidney injury


Current Visit: Yes   Status: Acute   





(2) Bladder outlet obstruction


Current Visit: Yes   Status: Acute   





(3) Bilateral hydronephrosis


Current Visit: Yes   Status: Acute   





(3) type 2 diabetes


Current Visit: Yes   Status: Acute  


- Plan





Plan:


Continue with plan of care as mentioned below


1. Gentle diuresing


2. Monitor renal function; continue to improve


3. Urology consultation appreciated; repeat renal ultrasound showed persistent 

hydronephrosis-possible J stent will be placed; 


4. Strict input and output


5. Strict blood sugar control


6. Continue Ditropan for bladder spasms


7. GI and DVT prophylaxis

## 2021-04-26 NOTE — RAD REPORT
EXAM DESCRIPTION:  NM - Kidney Imag W/Flow F W - 4/26/2021 10:12 am

 

CLINICAL HISTORY:  rule out obstruction

Flank pain, abdominal pain

 

COMPARISON:  Renal Ultrasound-Complete dated 4/25/2021; Stone Protocol dated 4/20/2021

 

TECHNIQUE:   Following intravenous administration of 10.6 mCi Tc99m MAG-3, posterior dynamic angiogra
m and sequential static images of the kidneys were obtained. 40 mg Lasix was administered intravenous
ly 10 minutes into the study. Static post-void imaging was also  performed.

 

FINDINGS:

Severe bilateral hydronephrosis and hydroureter is present.

 

Symmetric flow seen to both kidneys. The differential split function demonstrates 62% left renal func
tion and 32% right renal function.

 

Over time, the amount of radiotracer in both collecting systems is seen to increase gradually. Follow
ing the administration of Lasix, no significant diuresis is appreciated. This pattern suggests an rosa maria
tomic obstruction rather than functional in nature.

 

IMPRESSION:  Significant bilateral hydronephrosis and hydroureter is present likely attributable to a
natomic obstruction given the lack of diuresis with Lasix.

## 2021-04-26 NOTE — OP
Surgeon:  JAIMIE CLAYTON



Preoperative Diagnoses:  

1.   Bilateral ureteral obstruction.

2.   Bilateral hydronephrosis.

3.   Large volume urinary retention.

4.   Acute on Chronic Kidney Injury



Postoperative Diagnoses:  

1.   Bilateral ureteral obstruction.

2.   Bilateral hydronephrosis.

3.   Large volume urinary retention.

4.   Acute on Chronic Kidney Injury

5.   Possible primary bladder neck dysfunction.



Procedures:  

1.   Cystoscopy.

2.   Bilateral retrograde pyelographies.

3.   Bilateral complicated ureteral stents placed.

4.   Urethral Newton catheter exchange.



Indication For Procedure:  Mr. Harrell is a 40-year-old gentleman, who presented to
the hospital, unable to ambulate, potentially due to neuropathic complications 
from his diabetes or due to the added complications associated with his acute 
kidney injury and potential uremia.  He was managed initially with Newton 
catheter decompression of over 1.5 L of urine from his bladder, but despite 
appropriate antimicrobial therapy with meropenem and complete decompression of 
his bladder, after 5 days, bilateral severe hydronephrosis persisted and a MAG3 
Lasix renogram revealed persistent obstruction of likely anatomic origin.  As a 
result, I counseled the patient extensively on 3 different occasions, 2 by phone
and 1 in person regarding the need or recommendation for management of the 
obstruction by either placement of ureteral stents or placement of nephrostomy 
tubes.  Alternatively, I explained the patient could continue to observe with 
antimicrobial therapy and potentially have resolution of the hydronephrosis if 
ongoing UVJ obstruction due to the severity of the cystitis is the underlying 
source of the obstruction.  He agreed to proceed with cystoscopy and bilateral 
ureteral stent placement if necessary.



Procedure In Detail:  The patient was consented in the preoperative holding area
before being transferred to the operative suite where general anesthesia using 
an LMA was induced.  He was being treated with meropenem on the floor and so no 
additional IV antimicrobial prophylaxis was required.  Pneumoboots were applied 
for DVT prophylaxis and were active.  He was placed in the lithotomy position, 
padded and secured to the table appropriately.  His genitalia were prepped using
Hibiclens and draped in standard fashion after the urethral Newton catheter was 
removed.  The case was then begun following a time-out by passing a 22-Pakistani 
rigid cystoscope with ease through a normal urethra and entering beyond the 
sphincteric bulb before entering the bladder.  Of note, within the prostatic 
urethra, there was suggestion of elevation of the bladder neck, potentially 
consistent with primary bladder neck dysfunction.  The bladder was then entered 
and surveyed.  There were no concerning mucosal lesions, foreign bodies or 
stones within the bladder, though there was significant erythema and nodularity 
of the mucosa consistent with resolving severe cystitis.  In the dome of the 
bladder posteriorly, there was an area of trauma likely from the indwelling 
urethral Newton catheter tip there.  The bladder was moderately to severely 
trabeculated throughout.  The trigone and region at the bladder neck were 
relatively preserved, and the ureteral orifices were identified bilaterally.  I 
initially cannulated the right ureteral orifice with the tip of a 5-Pakistani 
ureteral access catheter with ease.  I was then able to perform a retrograde 
pyelogram on the right side. 



Right retrograde pyelography: 

Using a 70:30 mixture of Omnipaque and saline, contrast was injected via the 5-
Pakistani ureteral access catheter and did immediately identify a markedly dilated 
distal ureter.  A point of tortuosity was then encountered and contrast bolus of
10 mL was .  An additional 10 mL contrast bolus was required to get the 
contrast to navigate beyond several more tortuous points of the ureter before 
entering a massively dilated renal pelvis and calices.  I then was able to 
navigate an angled Sensor wire via the 5-Pakistani ureteral access catheter and get
it to coil within the renal pelvis and upper pole of the kidney.  Over the wire,
I was then able to pass a 6-Pakistani by 26 cm double-J right ureteral stent with 
ease.  A coil was observed fluoroscopically within the pelvis/upper pole, and 1 
cystoscopically within the bladder.  I then turned my attention to the left 
ureteral orifice to attempt to cannulate it with the 5-Pakistani ureteral access 
catheter.  Unfortunately, it would not easily cannulate on like the right side. 
So, I utilized the Sensor wire via the 5-Pakistani ureteral access catheter in 
order to gain appropriate access and placed a 5-Pakistani ureteral access catheter 
a couple of centimeters into the UVJ.  I then performed a retrograde pyelogram 
on the left side. 



Left retrograde pyelography: 

Using a 70:30 mixture of Omnipaque and saline, again contrast bolus delineated a
markedly dilated distal ureter with tortuosity at multiple points.  The contrast
would not enter the renal pelvis, so an additional contrast bolus was again 
required.  Eventually, a very markedly dilated renal pelvis with a point of 
obstruction at the ureteropelvic junction was observed.  I then attempted to 
pass the Sensor wire via the 5-Pakistani ureteral access catheter into the renal 
pelvis, but met a point of obstruction at the level of the ureteropelvic 
junction.  As a result, I left a wire coil there and advanced the 5-Pakistani 
ureteral access catheter up the tortuous ureter to the point of obstruction at 
the UPJ on the left side.  With great difficulty, I was then able to manipulate,
using fluoroscopic guidance, the angled Sensor wire beyond the point of UPJ 
obstruction and enter the renal pelvis.  With some further manipulation, I was 
able to then advance the 5-Pakistani ureteral access catheter and the Sensor wire 
eventually until the tortuosity of the ureter then snapped back to straight, and
I was able to then coil the wire within the renal pelvis, which was massively 
dilated.  I then removed the 5-Pakistani ureteral access catheter leaving the 
Sensor wire in place, and I then passed a 6-Pakistani by 26 cm double-J left 
ureteral stent into the left renal pelvis and upper pole.  A coil was observed 
again fluoroscopically there and 1 cystoscopically within the bladder.  I then 
surveyed the remainder of the bladder again for any abnormalities, particularly 
within the region of the bladder neck, and when none were noted, I then took 
photographic evidence of each relevant anatomic point for documentation purposes
and to provide to the patient to explain the potential source for the 
obstruction.  I am unable to explain the complicated urinary tract infection 
other than potentially just the chronicity of the infection.  Consideration must
still be given to the potential for an enterovesical fistula.  As a result, I 
then left his bladder full and passed a 16-Pakistani urethral Newton catheter via 
the urethra into the bladder with ease, and I instilled 10 mL of sterile water 
into the balloon.  The catheter was then connected to a floor bag and secured to
his leg with a StatLock.  He was then taken out of the lithotomy position, 
awakened from general anesthesia, transferred to a stretcher, and then 
transferred to the recovery room in good condition.



Complications:  None.



Estimated Blood Loss:  Negligible.



Discharge Disposition:  As the suspected nature of his obstruction appears to be
simply functional at this point due to the prolonged distention of his bladder 
and upper tracts, I do not suspect the stents will be required chronically.  I 
suspect his upper tracts may simply be dysplastically dilated at this point and 
may be relatively aperistaltic resulting in the inability to adequately drain 
and clear the urine and any tracer from a renography of his kidneys.  



I suspect there may be evidence of a primary bladder neck dysfunction-related 
obstruction here, which may benefit from transurethral incision of the bladder 
neck, but prior to this occurring, urodynamics will be required.  Thus, as 
previously explained to the patient, he should maintain the urethral Newton 
catheter for a minimum of 2 full weeks before following up with MIKE Diamond in 
clinic for a fill-pull-voiding trial. Continue Flomax. Set up urodynamic 
testing.



The stents should remain in place for at least 6 weeks to allow resolution of 
the chronic distention.  After 6 weeks, obtain a renal U/S to assess for 
persistent severe hydronephrosis.



Following U/S completion, he should follow up with me (~6-8 weeks) where 
consideration may be given to removal of the stents with repeat MAG3 Lasix 
renogram to assess for resolution and recovery of function obtained within 6 
weeks after that.  

Alternatively, given the complicated urinary tract infection, a CT scan of his 
abdomen and pelvis with oral and rectal contrast may be considered not only to 
document resolution of the bilateral hydronephrosis with the stents in place, 
but also to rule out the presence of an enterovesical fistula.



All of these investigations are recommended and to be obtained as an outpatient 
after an adequate, 14 day, treatment course with cultures and sensitivity 
specific antimicrobial therapy.





NUHA/ANT

DD:  2021 18:42:40   Voice ID:  [id number]

DT:  2021 21:35:54   Report ID:  [id number]

[email or web address]

## 2021-04-26 NOTE — ECHO
HEIGHT: 5 ft 4 in   WEIGHT: 240 lb 0 oz   DATE OF STUDY: 04/23/2021   REFER DR: Valeri Gardner MD

2-DIMENSIONAL: YES

     M.MODE: YES

 DOPPLER: YES

COLOR FLOW: YES



                    TDS:  

PORTABLE: 

 DEFINITY:  

BUBBLE STUDY: 





DIAGNOSIS:  TACHYCARDIA/ ARRHYTHMIA



CARDIAC HISTORY:  

CATHERIZATION: NO

SURGERY: NO

PROSTHETIC VALVE: NO

PACEMAKER: NO





MEASUREMENTS (cm)

    DIASTOLIC (NORMALS)                 SYSTOLIC (NORMALS)

IVSd                 1.2 (0.6-1.2)                    LA Diam 2.8 (1.9-4.0)     LVEF       
  56%  

LVIDd               3.5 (3.5-5.7)                        LVIDs      2.5 (2.0-3.5)     %FS  
        28%

LVPWd             1.2 (0.6-1.2)

Ao Diam           3.2 (2.0-3.7)



2 DIMENSIONAL ASSESSMENT:

RIGHT ATRIUM:                   

LEFT ATRIUM:       



RIGHT VENTRICLE:            

LEFT VENTRICLE: 



TRICUSPID VALVE:             

MITRAL VALVE:     



PULMONIC VALVE:             

AORTIC VALVE:     



PERICARDIAL EFFUSION: 

AORTIC ROOT:      





LEFT VENTRICULAR WALL MOTION:     



DOPPLER/COLOR FLOW:     



COMMENTS:      NORMAL 2-DIMENSIONAL ECHOCARDIOGRAM.  NORMAL WALL MOTION ABNORMALITY.  

                            NO EFFUSION.



TECHNOLOGIST:   ABBY RODRIGEZ

## 2021-04-26 NOTE — P.PN
Subjective


Date of Service: 04/26/21


Primary Care Provider: Dr. Noel


Chief Complaint: UTI, EROS, hydronephrosis


Subjective: Other (Patient still reports pain.  Patient to have surgery today 

for stent placement.)





Physical Examination





- Vital Signs


Temperature: 97.1 F


Blood Pressure: 126/84


Pulse: 84


Respirations: 16


Pulse Ox (%): 95





- Studies


Microbiology Data (last 24 hrs): 








04/20/21 20:40   Blood  - Blood   Aerobic Blood Culture - Final


                            No growth in 5 days.


04/20/21 20:40   Blood  - Blood   Anaerobic Blood Culture - Final


                            No growth in 5 days.


04/20/21 20:25   Blood  - Blood   Aerobic Blood Culture - Final


                            No growth in 5 days.


04/20/21 20:25   Blood  - Blood   Anaerobic Blood Culture - Final


                            No growth in 5 days.





Medications List Reviewed: Yes





Assessment & Plan


Discharge Plan: Home


Plan to discharge in: 48 Hours


Physician Review Additional Text: 








Physical exam: 


Patient alert, cooperative.  


Heart: Regular rate and rhythm 


Lungs: Clear to auscultation 


Abdomen: Soft.  Tender to the right flank. 


Extremities: Good range of motion to the upper and lower extremities 





Impression: 


Acute renal failure with bladder outlet obstruction with noted bilateral 

hydronephrosis


UTI, urine culture E. Coli-ESBL


Diabetes mellitus type 2





Plan:


Urology to have bilateral ureteral stents placed.  Urine culture positive for 

ESBL.  Patient will require IV antibiotic therapy.  Will place PICC line.  Will 

likely need 2 weeks of antibiotic therapy or more.  Will discuss with nephrology

and urology.  Continue treatment for diabetes.  We will monitor this closely.  

Continue to reassess and monitor closely.  Patient on Ditropan due to bladder 

spasm.  Will treat with pain medication.  Anticipate improvement over the next 

48 hours.  Old consult social work to help with arrangements for IV diabetic 

therapy as an outpatient.


Time Spent Managing Pts Care (In Minutes): 55

## 2021-04-27 LAB
BUN BLD-MCNC: 27 MG/DL (ref 7–18)
GLUCOSE SERPLBLD-MCNC: 375 MG/DL (ref 74–106)
MAGNESIUM SERPL-MCNC: 1.6 MG/DL (ref 1.8–2.4)
POTASSIUM SERPL-SCNC: 4.4 MMOL/L (ref 3.5–5.1)

## 2021-04-27 RX ADMIN — HYDROMORPHONE HYDROCHLORIDE PRN MG: 1 INJECTION, SOLUTION INTRAMUSCULAR; INTRAVENOUS; SUBCUTANEOUS at 06:11

## 2021-04-27 RX ADMIN — HYDROMORPHONE HYDROCHLORIDE PRN MG: 1 INJECTION, SOLUTION INTRAMUSCULAR; INTRAVENOUS; SUBCUTANEOUS at 15:54

## 2021-04-27 RX ADMIN — MEROPENEM SCH MLS: 1 INJECTION INTRAVENOUS at 09:58

## 2021-04-27 RX ADMIN — LOSARTAN POTASSIUM SCH MG: 50 TABLET, FILM COATED ORAL at 21:07

## 2021-04-27 RX ADMIN — INSULIN GLARGINE SCH UNITS: 100 INJECTION, SOLUTION SUBCUTANEOUS at 21:09

## 2021-04-27 RX ADMIN — HEPARIN SODIUM SCH UNIT: 5000 INJECTION, SOLUTION INTRAVENOUS; SUBCUTANEOUS at 01:10

## 2021-04-27 RX ADMIN — HUMAN INSULIN SCH UNIT: 100 INJECTION, SOLUTION SUBCUTANEOUS at 21:09

## 2021-04-27 RX ADMIN — HEPARIN SODIUM SCH UNIT: 5000 INJECTION, SOLUTION INTRAVENOUS; SUBCUTANEOUS at 10:00

## 2021-04-27 RX ADMIN — HUMAN INSULIN SCH UNIT: 100 INJECTION, SOLUTION SUBCUTANEOUS at 17:37

## 2021-04-27 RX ADMIN — Medication SCH ML: at 21:07

## 2021-04-27 RX ADMIN — OXYBUTYNIN CHLORIDE SCH MG: 5 TABLET ORAL at 21:07

## 2021-04-27 RX ADMIN — MEROPENEM SCH MLS: 1 INJECTION INTRAVENOUS at 21:06

## 2021-04-27 RX ADMIN — METOPROLOL TARTRATE SCH MG: 25 TABLET ORAL at 17:37

## 2021-04-27 RX ADMIN — OXYBUTYNIN CHLORIDE SCH MG: 5 TABLET ORAL at 10:01

## 2021-04-27 RX ADMIN — NICOTINE SCH MG: 14 PATCH TRANSDERMAL at 09:59

## 2021-04-27 RX ADMIN — HUMAN INSULIN SCH UNIT: 100 INJECTION, SOLUTION SUBCUTANEOUS at 13:00

## 2021-04-27 RX ADMIN — HUMAN INSULIN SCH UNIT: 100 INJECTION, SOLUTION SUBCUTANEOUS at 07:30

## 2021-04-27 RX ADMIN — METOPROLOL TARTRATE SCH MG: 25 TABLET ORAL at 06:12

## 2021-04-27 RX ADMIN — Medication SCH ML: at 09:00

## 2021-04-27 RX ADMIN — HEPARIN SODIUM SCH UNIT: 5000 INJECTION, SOLUTION INTRAVENOUS; SUBCUTANEOUS at 17:36

## 2021-04-27 RX ADMIN — LOSARTAN POTASSIUM SCH MG: 50 TABLET, FILM COATED ORAL at 09:59

## 2021-04-27 NOTE — P.PN
Date of Service: 04/27/21


Vital Signs











Temp Pulse Resp BP Pulse Ox


 


 98.1 F   89   18   123/71   96 


 


 04/27/21 16:00  04/27/21 17:37  04/27/21 16:24  04/27/21 17:37  04/27/21 16:24





Medications





Acetaminophen (Acetaminophen 500 Mg Tab)  500 mg PO Q4HP PRN


   PRN Reason: Pain scale 2-4 (Mild)


   Last Admin: 04/21/21 20:05 Dose:  500 mg


   Documented by: 


Hydrocodone Bitart/Acetaminophen (Hydrocodone/Apap 10/325 Tab)  1 tab PO Q4H PRN


   PRN Reason: Pain scale 8-10 (Severe)


   Last Admin: 04/27/21 09:57 Dose:  1 tab


   Documented by: 


Dextrose (D50w 25 Gm/50 Ml Vial)  12.5 gm IV PRN PRN; Protocol


   PRN Reason: HYPOGLYCEMIA


Glucagon (Glucagon 1 Mg/Vial)  1 mg IM 1X PRN; Protocol


   PRN Reason: HYPOGLYCEMIA


Heparin Sodium (Porcine) (Heparin 5000 Unit/Ml 1 Ml Vial)  5,000 unit SQ Q8HR 

[facility]


   Last Admin: 04/27/21 17:36 Dose:  5,000 unit


   Documented by: 


Hydralazine HCl (Hydralazine Hcl 20 Mg/Ml Vial)  10 mg IV Q6HP PRN


   PRN Reason: Titrate to SBP (MUST DEFINE)


   Last Admin: 04/23/21 00:23 Dose:  10 mg


   Documented by: 


Hydrochlorothiazide (Hydrochlorothiazide 25 Mg Tab)  25 mg PO DAILY [facility]


   Last Admin: 04/27/21 09:59 Dose:  25 mg


   Documented by: 


Hydromorphone HCl (Hydromorphone Hcl 0.5 Mg/0.5 Ml Inj)  0.5 mg IV Q6H PRN


   PRN Reason: Pain scale 8-10 (Severe)


   Last Admin: 04/27/21 15:54 Dose:  0.5 mg


   Documented by: 


Meropenem (Merrem 1 Gm/100 Ml Ns Ivpb)  1 gm in 100 mls @ 100 mls/hr IV BID [facility]


   Last Admin: 04/27/21 09:58 Dose:  100 mls


   Documented by: 


Insulin Glargine (Insulin Glargine 100 Units/Ml)  50 units SQ BEDTIME [facility]


Insulin Human Regular (Insulin -Regular Human 50 Unit/0.5 Ml Ml)  0 unit SQ ACHS

[facility]; Protocol


   Last Admin: 04/27/21 17:37 Dose:  8 unit


   Documented by: 


Losartan Potassium (Losartan Potassium 50 Mg Tablet)  50 mg PO BID [facility]


   Last Admin: 04/27/21 09:59 Dose:  50 mg


   Documented by: 


Metoprolol Tartrate (Metoprolol Tar 25 Mg Tab)  25 mg PO BID 6AM 6PM [facility]


   Last Admin: 04/27/21 17:37 Dose:  25 mg


   Documented by: 


Nicotine (Nicotine 14 Mg/Pat)  14 mg TD DAILY [facility]


   Last Admin: 04/27/21 09:59 Dose:  14 mg


   Documented by: 


Ondansetron HCl (Ondansetron 4 Mg/2 Ml Vial)  4 mg IV Q6HP PRN


   PRN Reason: NAUSEA / VOMITING


   Last Admin: 04/21/21 03:30 Dose:  4 mg


   Documented by: 


Oxybutynin Chloride (Oxybutynin Chloride 5 Mg Tab)  5 mg PO BID [facility]


   Last Admin: 04/27/21 10:01 Dose:  5 mg


   Documented by: 


Sodium Chloride (Flush Normal Saline 10 Ml)  10 ml IV BID [facility]


   Last Admin: 04/27/21 09:00 Dose:  10 ml


   Documented by: 


Tramadol HCl (Tramadol Hcl 50 Mg Tab)  50 mg PO TID PRN


   PRN Reason: Pain scale 2-4 (Mild)


Microbiology Results





04/20/21 20:40   Blood  - Blood   Aerobic Blood Culture - Final


                            No growth in 5 days.


04/20/21 20:40   Blood  - Blood   Anaerobic Blood Culture - Final


                            No growth in 5 days.


04/20/21 20:25   Blood  - Blood   Aerobic Blood Culture - Final


                            No growth in 5 days.


04/20/21 20:25   Blood  - Blood   Anaerobic Blood Culture - Final


                            No growth in 5 days.


04/20/21 20:10   Clean Catch Urine   [place] Count - Final


                            >100,000 CFU/ML.


04/20/21 20:10   Clean Catch Urine    - Final


                            Escherichia Coli Esbl





Assessment/ Plan: 


Nephrology





No acute cardiac or pulmonary complaints.


No CP or SOB.


No acute events overnight.





Vitals, medications, blood work and imaging reviewed in the chart.


General: Alert, Oriented x3, Cooperative


HEENT: Atraumatic


Neck: Supple


Respiratory: Clear to auscultation bilaterally


Cardiovascular: Regular rate/rhythm, Edema


Gastrointestinal: Soft and benign, Non-distended, Tenderness


Musculoskeletal: No clubbing, No contractures


Integumentary: No rashes, No cyanosis


Neurological: Normal speech





Laboratory Data (last 24 hrs)


04/20/21 20:05: PT 12.1, INR 1.05


04/20/21 20:05: WBC 13.40 H, Hgb 10.7 L, Hct 31.7 L, Plt Count 456 H


04/20/21 20:05: Sodium 126 L, Potassium 3.9, BUN 37 H, Creatinine 3.27 H, 

Glucose 775 H*, Magnesium 2.2, Total Bilirubin 0.2, AST 8 L, ALT 12, Alkaline 

Phosphatase 199 H, Lipase 160





Imagings Data: 


EXAM DESCRIPTION:  RAD - Chest Single View - 4/20/2021 8:06 pm


CLINICAL HISTORY:  SWELLING


Chest pain.


COMPARISON:  No comparisons


FINDINGS:  Portable technique limits examination quality.


Mild interstitial pulmonary edema suspected. The heart is upper limit normal in 

size with a tortuous thoracic aorta. No displaced fractures.








EXAM DESCRIPTION:  CT - Stone Protocol - 4/20/2021 8:15 pm


CLINICAL HISTORY:  Flank pain.


HEMATURIA


COMPARISON:  No comparisons


TECHNIQUE:  Axial images were obtained without oral or IV contrast. Lack of 

contrast limits solid organ and vascular assessment. The field-of-view spans the

entirety of the  system partially obscuring uppermost abdomen and lung bases. 

Coronal reformatted images were obtained and reviewed.


All CT scans are performed using dose optimization technique as appropriate and 

may include automated exposure control or mA/KV adjustment according to patient 

size.


FINDINGS:  The lower lung fields are clear.


Imaged portions of the liver and spleen show no suspicious findings on non-

contrast imaging. The pancreas and adrenal glands are normal. No pathologic 

lymphadenopathy in the abdomen or pelvis.


Severe bilateral hydroureter and hydronephrosis is present. The urinary bladder 

is distended and contains air. No obstructing renal calculus seen.


No bowel obstruction, free air, free fluid or abscess. Normal appendix noted.Fat

containing bilateral inguinal hernias, larger on the right.


No significant bony abnormality.


IMPRESSION:  Severe bilateral hydroureter, hydronephrosis in urinary bladder 

distention is present. Air is also present in the bladder, which may be related 

to infection or recent instrumentation. A bladder outlet obstruction is 

suspected.





Conclusions/Impression: 


A/P:  Continue the current POC and Medications other than the changes listed.  

AM Labs PRN.  Recommend daily weight.  Please see the orders for complete 

details.





EROS likely due to excessive NSAIDs complicated by urinary obstruction


CKD (Baseline CKD is unclear)


-No NSAIDs


-Continue de oliveira





Bladder outlet obstruction with BL hydronephrosis and hydroureter


-Continue de oliveira


-Follow up with urology





Hyponatremia resolved





Hypokalemia resolved





Hypomagnesemia


-Replete with IV mag





HTN with tachycardia


-Continue Losartan BID





LE Edema


-Continue HCTZ





DM II with CKD


-Continue Lantus


-Continue RISS





Moderate malnutrition


-Encourage nutrition





Anemia in chronic illness


-Monitor H&H





Acute E.coli cystitis


-Continue Meropenem

## 2021-04-27 NOTE — P.PN
Subjective


Date of Service: 04/27/21


Primary Care Provider: Dr. Noel


Chief Complaint: UTI, EROS, hydronephrosis


Subjective: Improving, Doing well





Physical Examination





- Vital Signs


Temperature: 97.9 F


Blood Pressure: 122/71


Pulse: 91


Respirations: 18


Pulse Ox (%): 94





- Studies


Medications List Reviewed: Yes





Assessment & Plan


Discharge Plan: Home


Plan to discharge in: 48 Hours


Physician Review Additional Text: 








Physical exam: 


Patient alert, cooperative.  


Heart: Regular rate and rhythm 


Lungs: Clear to auscultation 


Abdomen: Soft.  Pain to the flank improved.  


Extremities: Good range of motion to the upper and lower extremities 





Impression: 


Acute renal failure with bilateral ureteral obstruction, bilateral hydro

nephrosis, large volume urinary retention status post cystoscopy, bilateral 

retrograde pyelogram phase, bilateral complicated ureteral stents placement, 

urethral Newton catheter exchange 


UTI, urine culture E. Coli-ESBL


Diabetes mellitus type 2 with hyperglycemia


Hypertension


Tobacco abuse


Obesity, BMI 39.9





Plan:


Acute renal failure with bilateral ureteral obstruction, bilateral 

hydronephrosis, large volume urinary retention status post cystoscopy, bilateral

retrograde pyelogram phase, bilateral complicated ureteral stents placement, 

urethral Newton catheter exchange: Patient had bilateral ureteral stents placed 

yesterday.  Continue with pain control.  Encourage ambulation.  Encourage 

incentive spirometer.  DVT prophylaxis in place.  Patient with UTIE. coliESBL.

 PICC line ordered.  Patient will require IV Invanz 1 g for 14 days.  As the 

patient is uninsured will need to arrange this through the hospital.  We will 

have  help in this process.  Urology plans to recheck renal 

ultrasound and MAG3 Lasix renogram in 6 to 8 weeks.  This is to consider removal

of ureteral stents.  Newton catheter in place.  We will monitor this closely.  

Anticipate possible discharge in the next 48 hours.


UTI, urine culture E. Coli-ESBL: We will arrange for IV Invanz 1 g for 14 days. 

Recheck culture again today.


Diabetes mellitus type 2 with hyperglycemia: Continue Lantus.  Increase Lantus 

for better diabetic control.  We will continue to adjust accordingly.


Hypertension: Continue blood pressure medication losartan, hydrochlorothiazide 

and metoprolol.  Blood pressure stable.


Tobacco abuse: Continue nicotine patch


Obesity, BMI 39.9: Continue with lifestyle modification education.


Time Spent Managing Pts Care (In Minutes): 55

## 2021-04-28 LAB
BUN BLD-MCNC: 28 MG/DL (ref 7–18)
GLUCOSE SERPLBLD-MCNC: 139 MG/DL (ref 74–106)
HCT VFR BLD CALC: 30.2 % (ref 39.6–49)
LYMPHOCYTES # SPEC AUTO: 4.9 K/UL (ref 0.7–4.9)
MAGNESIUM SERPL-MCNC: 2 MG/DL (ref 1.8–2.4)
PMV BLD: 9.3 FL (ref 7.6–11.3)
POTASSIUM SERPL-SCNC: 3.7 MMOL/L (ref 3.5–5.1)
RBC # BLD: 3.41 M/UL (ref 4.33–5.43)

## 2021-04-28 PROCEDURE — 02HV33Z INSERTION OF INFUSION DEVICE INTO SUPERIOR VENA CAVA, PERCUTANEOUS APPROACH: ICD-10-PCS

## 2021-04-28 RX ADMIN — MEROPENEM SCH MLS: 1 INJECTION INTRAVENOUS at 10:01

## 2021-04-28 RX ADMIN — HUMAN INSULIN SCH: 100 INJECTION, SOLUTION SUBCUTANEOUS at 07:30

## 2021-04-28 RX ADMIN — HEPARIN SODIUM SCH UNIT: 5000 INJECTION, SOLUTION INTRAVENOUS; SUBCUTANEOUS at 17:01

## 2021-04-28 RX ADMIN — HYDROMORPHONE HYDROCHLORIDE PRN MG: 1 INJECTION, SOLUTION INTRAMUSCULAR; INTRAVENOUS; SUBCUTANEOUS at 00:09

## 2021-04-28 RX ADMIN — INSULIN GLARGINE SCH UNITS: 100 INJECTION, SOLUTION SUBCUTANEOUS at 22:13

## 2021-04-28 RX ADMIN — HUMAN INSULIN SCH UNIT: 100 INJECTION, SOLUTION SUBCUTANEOUS at 17:01

## 2021-04-28 RX ADMIN — OXYBUTYNIN CHLORIDE SCH MG: 5 TABLET ORAL at 22:11

## 2021-04-28 RX ADMIN — NICOTINE SCH MG: 14 PATCH TRANSDERMAL at 10:00

## 2021-04-28 RX ADMIN — HEPARIN SODIUM SCH UNIT: 5000 INJECTION, SOLUTION INTRAVENOUS; SUBCUTANEOUS at 09:00

## 2021-04-28 RX ADMIN — LOSARTAN POTASSIUM SCH MG: 50 TABLET, FILM COATED ORAL at 22:11

## 2021-04-28 RX ADMIN — METOPROLOL TARTRATE SCH MG: 25 TABLET ORAL at 17:02

## 2021-04-28 RX ADMIN — Medication SCH ML: at 22:13

## 2021-04-28 RX ADMIN — MEROPENEM SCH MLS: 1 INJECTION INTRAVENOUS at 22:11

## 2021-04-28 RX ADMIN — HYDROMORPHONE HYDROCHLORIDE PRN MG: 1 INJECTION, SOLUTION INTRAMUSCULAR; INTRAVENOUS; SUBCUTANEOUS at 17:00

## 2021-04-28 RX ADMIN — LOSARTAN POTASSIUM SCH MG: 50 TABLET, FILM COATED ORAL at 10:00

## 2021-04-28 RX ADMIN — HEPARIN SODIUM SCH UNIT: 5000 INJECTION, SOLUTION INTRAVENOUS; SUBCUTANEOUS at 00:09

## 2021-04-28 RX ADMIN — HUMAN INSULIN SCH UNIT: 100 INJECTION, SOLUTION SUBCUTANEOUS at 12:59

## 2021-04-28 RX ADMIN — Medication SCH ML: at 09:00

## 2021-04-28 RX ADMIN — OXYBUTYNIN CHLORIDE SCH MG: 5 TABLET ORAL at 10:00

## 2021-04-28 RX ADMIN — HUMAN INSULIN SCH UNIT: 100 INJECTION, SOLUTION SUBCUTANEOUS at 22:12

## 2021-04-28 RX ADMIN — METOPROLOL TARTRATE SCH MG: 25 TABLET ORAL at 05:28

## 2021-04-28 NOTE — RAD REPORT
EXAM DESCRIPTION:  RAD - Chest Single View - 4/28/2021 2:44 pm

 

CLINICAL HISTORY:  PICC line placement

 

COMPARISON:  Chest Single View dated 4/20/2021

 

FINDINGS:  Portable chest was obtained following placement of a left upper extremity PICC line. The c
atheter tip projects over the SVC.

## 2021-04-28 NOTE — P.PN
Date of Service: 04/28/21


Vital Signs











Temp Pulse Resp BP Pulse Ox


 


 97.3 F   93 H  16   105/64   93 


 


 04/28/21 16:00  04/28/21 17:02  04/28/21 17:30  04/28/21 17:02  04/28/21 17:30





Medications





Acetaminophen (Acetaminophen 500 Mg Tab)  500 mg PO Q4HP PRN


   PRN Reason: Pain scale 2-4 (Mild)


   Last Admin: 04/21/21 20:05 Dose:  500 mg


   Documented by: 


Hydrocodone Bitart/Acetaminophen (Hydrocodone/Apap 10/325 Tab)  1 tab PO Q4H PRN


   PRN Reason: Pain scale 8-10 (Severe)


   Last Admin: 04/27/21 09:57 Dose:  1 tab


   Documented by: 


Dextrose (D50w 25 Gm/50 Ml Vial)  12.5 gm IV PRN PRN; Protocol


   PRN Reason: HYPOGLYCEMIA


Docusate Sodium (Docusate Na 100 Mg Cap)  100 mg PO DAILY [facility]


Glucagon (Glucagon 1 Mg/Vial)  1 mg IM 1X PRN; Protocol


   PRN Reason: HYPOGLYCEMIA


Heparin Sodium (Porcine) (Heparin 5000 Unit/Ml 1 Ml Vial)  5,000 unit SQ Q8HR 

[facility]


   Last Admin: 04/28/21 17:01 Dose:  5,000 unit


   Documented by: 


Hydralazine HCl (Hydralazine Hcl 20 Mg/Ml Vial)  10 mg IV Q6HP PRN


   PRN Reason: Titrate to SBP (MUST DEFINE)


   Last Admin: 04/23/21 00:23 Dose:  10 mg


   Documented by: 


Hydrochlorothiazide (Hydrochlorothiazide 25 Mg Tab)  25 mg PO DAILY [facility]


   Last Admin: 04/28/21 10:00 Dose:  25 mg


   Documented by: 


Hydromorphone HCl (Hydromorphone Hcl 0.5 Mg/0.5 Ml Inj)  0.5 mg IV Q6H PRN


   PRN Reason: Pain scale 8-10 (Severe)


   Last Admin: 04/28/21 17:00 Dose:  0.5 mg


   Documented by: 


Meropenem (Merrem 1 Gm/100 Ml Ns Ivpb)  1 gm in 100 mls @ 100 mls/hr IV BID [facility]


   Last Admin: 04/28/21 22:11 Dose:  100 mls


   Documented by: 


Insulin Glargine (Insulin Glargine 100 Units/Ml)  50 units SQ BEDTIME [facility]


   Last Admin: 04/28/21 22:13 Dose:  50 units


   Documented by: 


Insulin Human Regular (Insulin -Regular Human 50 Unit/0.5 Ml Ml)  0 unit SQ ACHS

[facility]; Protocol


   Last Admin: 04/28/21 22:12 Dose:  10 unit


   Documented by: 


Lactulose (Lactulose 20 Gm/30 Ml Ucup)  10 gm PO BID PRN


   PRN Reason: CONSTIPATION


Losartan Potassium (Losartan Potassium 50 Mg Tablet)  50 mg PO BID [facility]


   Last Admin: 04/28/21 22:11 Dose:  50 mg


   Documented by: 


Metoprolol Tartrate (Metoprolol Tar 25 Mg Tab)  25 mg PO BID 6AM 6PM [facility]


   Last Admin: 04/28/21 17:02 Dose:  25 mg


   Documented by: 


Nicotine (Nicotine 14 Mg/Pat)  14 mg TD DAILY [facility]


   Last Admin: 04/28/21 10:00 Dose:  14 mg


   Documented by: 


Ondansetron HCl (Ondansetron 4 Mg/2 Ml Vial)  4 mg IV Q6HP PRN


   PRN Reason: NAUSEA / VOMITING


   Last Admin: 04/21/21 03:30 Dose:  4 mg


   Documented by: 


Oxybutynin Chloride (Oxybutynin Chloride 5 Mg Tab)  5 mg PO BID [facility]


   Last Admin: 04/28/21 22:11 Dose:  5 mg


   Documented by: 


Sodium Chloride (Flush Normal Saline 10 Ml)  10 ml IV BID [facility]


   Last Admin: 04/28/21 22:13 Dose:  10 ml


   Documented by: 


Tramadol HCl (Tramadol Hcl 50 Mg Tab)  50 mg PO TID PRN


   PRN Reason: Pain scale 2-4 (Mild)


Microbiology Results





04/20/21 20:40   Blood  - Blood   Aerobic Blood Culture - Final


                            No growth in 5 days.


04/20/21 20:40   Blood  - Blood   Anaerobic Blood Culture - Final


                            No growth in 5 days.


04/20/21 20:25   Blood  - Blood   Aerobic Blood Culture - Final


                            No growth in 5 days.


04/20/21 20:25   Blood  - Blood   Anaerobic Blood Culture - Final


                            No growth in 5 days.


04/20/21 20:10   Clean Catch Urine   [place] Count - Final


                            >100,000 CFU/ML.


04/20/21 20:10   Clean Catch Urine    - Final


                            Escherichia Coli Esbl





Assessment/ Plan: 


Nephrology





No acute cardiac or pulmonary complaints.


No CP or SOB.


No acute events overnight.





Vitals, medications, blood work and imaging reviewed in the chart.


General: Alert, Oriented x3, Cooperative


HEENT: Atraumatic


Neck: Supple


Respiratory: Clear to auscultation bilaterally


Cardiovascular: Regular rate/rhythm, No edema


Gastrointestinal: Soft and benign, Non-distended, Tenderness


Musculoskeletal: No clubbing, No contractures


Integumentary: No rashes, No cyanosis


Neurological: Normal speech





Laboratory Data (last 24 hrs)


04/20/21 20:05: PT 12.1, INR 1.05


04/20/21 20:05: WBC 13.40 H, Hgb 10.7 L, Hct 31.7 L, Plt Count 456 H


04/20/21 20:05: Sodium 126 L, Potassium 3.9, BUN 37 H, Creatinine 3.27 H, 

Glucose 775 H*, Magnesium 2.2, Total Bilirubin 0.2, AST 8 L, ALT 12, Alkaline 

Phosphatase 199 H, Lipase 160





Imagings Data: 


EXAM DESCRIPTION:  RAD - Chest Single View - 4/20/2021 8:06 pm


CLINICAL HISTORY:  SWELLING


Chest pain.


COMPARISON:  No comparisons


FINDINGS:  Portable technique limits examination quality.


Mild interstitial pulmonary edema suspected. The heart is upper limit normal in 

size with a tortuous thoracic aorta. No displaced fractures.








EXAM DESCRIPTION:  CT - [place] Protocol - 4/20/2021 8:15 pm


CLINICAL HISTORY:  Flank pain.


HEMATURIA


COMPARISON:  No comparisons


TECHNIQUE:  Axial images were obtained without oral or IV contrast. Lack of 

contrast limits solid organ and vascular assessment. The field-of-view spans the

entirety of the  system partially obscuring uppermost abdomen and lung bases. 

Coronal reformatted images were obtained and reviewed.


All CT scans are performed using dose optimization technique as appropriate and 

may include automated exposure control or mA/KV adjustment according to patient 

size.


FINDINGS:  The lower lung fields are clear.


Imaged portions of the liver and spleen show no suspicious findings on non-

contrast imaging. The pancreas and adrenal glands are normal. No pathologic 

lymphadenopathy in the abdomen or pelvis.


Severe bilateral hydroureter and hydronephrosis is present. The urinary bladder 

is distended and contains air. No obstructing renal calculus seen.


No bowel obstruction, free air, free fluid or abscess. Normal appendix noted.Fat

containing bilateral inguinal hernias, larger on the right.


No significant bony abnormality.


IMPRESSION:  Severe bilateral hydroureter, hydronephrosis in urinary bladder 

distention is present. Air is also present in the bladder, which may be related 

to infection or recent instrumentation. A bladder outlet obstruction is 

suspected.





Conclusions/Impression: 


A/P:  Continue the current POC and Medications other than the changes listed.  

AM Labs PRN.  Recommend daily weight.  Please see the orders for complete 

details.





EROS likely due to excessive NSAIDs complicated by urinary obstruction


CKD (Baseline CKD is unclear)


-No NSAIDs


-Continue de oliveira





Bladder outlet obstruction with BL hydronephrosis and hydroureter


-Continue de oliveira


-Follow up with urology





Hyponatremia resolved





Hypokalemia resolved





Hypomagnesemia


-Replete with IV mag





HTN with tachycardia


-Continue Losartan BID





LE Edema


-Continue HCTZ





DM II with CKD


-Continue Lantus


-Continue RISS





Moderate malnutrition


-Encourage nutrition





Anemia in chronic illness


-Monitor H&H





Acute E.coli cystitis


-Continue Meropenem

## 2021-04-28 NOTE — P.PN
Subjective


Date of Service: 04/28/21


Primary Care Provider: Dr. Noel


Chief Complaint: UTI, EROS, hydronephrosis


Subjective: Improving (Nurses report patient had some constipation.)





Physical Examination





- Vital Signs


Temperature: 97.1 F


Blood Pressure: 123/63


Pulse: 72


Respirations: 16


Pulse Ox (%): 96





- Studies


Medications List Reviewed: Yes





Assessment & Plan


Discharge Plan: Home


Plan to discharge in: 24 Hours


Physician Review Additional Text: 








Physical exam: 


Patient alert, cooperative.  


Heart: Regular rate and rhythm 


Lungs: Clear to auscultation 


Abdomen: Soft.  Pain to the flank improved.  


Extremities: Good range of motion to the upper and lower extremities 





Impression: 


Acute renal failure with bilateral ureteral obstruction, bilateral 

hydronephrosis, large volume urinary retention status post cystoscopy, bilateral

retrograde pyelogram phase, bilateral complicated ureteral stents placement, 

urethral Newton catheter exchange 


UTI, urine culture E. Coli-ESBL


Diabetes mellitus type 2 with hyperglycemia


Hypertension


Tobacco abuse


Obesity, BMI 39.9





Plan:


Acute renal failure with bilateral ureteral obstruction, bilateral 

hydronephrosis, large volume urinary retention status post cystoscopy, bilateral

retrograde pyelogram phase, bilateral complicated ureteral stents placement, 

urethral Newton catheter exchange: Patient had bilateral ureteral stents placed 

the other day.  Continue with pain control.  Encourage ambulation.  Encourage 

incentive spirometer.  DVT prophylaxis in place.  Patient reported some 

constipation.  Will provide a stool softener and medication for constipation.  

Repeat urine culture obtained.  Patient with UTIE. coliESBL.  PICC line orde

red.  Hope to get PICC line done today.  Social work to help arrange for 

hospital arrangements of patient getting IV Invanz 1 g for 14 days as an 

outpatient. Urology plans to recheck renal ultrasound and MAG3 Lasix renogram in

6 to 8 weeks.  This will need to be arranged by the patient.  We will have them 

do that now prior to his discharge.  Newton catheter in place.  Renal function 

improved.  Anticipate likely discharge tomorrow with arrangements above. 


UTI, urine culture E. Coli-ESBL: PICC line to be arranged today.  We will 

arrange for IV Invanz 1 g for 14 days.  Recheck culture again today.


Diabetes mellitus type 2 with hyperglycemia: Continue Lantus.  Continue to 

adjust Lantus for better diabetic control.  We will continue to adjust 

accordingly.


Hypertension: Continue blood pressure medication losartan, hydrochlorothiazide 

and metoprolol.  Blood pressure stable.


Tobacco abuse: Continue nicotine patch


Obesity, BMI 39.9: Continue with lifestyle modification education.


Time Spent Managing Pts Care (In Minutes): 55

## 2021-04-29 VITALS — SYSTOLIC BLOOD PRESSURE: 127 MMHG | TEMPERATURE: 97.2 F | DIASTOLIC BLOOD PRESSURE: 64 MMHG

## 2021-04-29 VITALS — OXYGEN SATURATION: 97 %

## 2021-04-29 LAB
BUN BLD-MCNC: 29 MG/DL (ref 7–18)
GLUCOSE SERPLBLD-MCNC: 255 MG/DL (ref 74–106)
HCT VFR BLD CALC: 28.7 % (ref 39.6–49)
LYMPHOCYTES # SPEC AUTO: 2.9 K/UL (ref 0.7–4.9)
MAGNESIUM SERPL-MCNC: 1.9 MG/DL (ref 1.8–2.4)
PMV BLD: 9.5 FL (ref 7.6–11.3)
POTASSIUM SERPL-SCNC: 3.9 MMOL/L (ref 3.5–5.1)
RBC # BLD: 3.24 M/UL (ref 4.33–5.43)

## 2021-04-29 RX ADMIN — HEPARIN SODIUM SCH UNIT: 5000 INJECTION, SOLUTION INTRAVENOUS; SUBCUTANEOUS at 09:07

## 2021-04-29 RX ADMIN — Medication SCH ML: at 09:08

## 2021-04-29 RX ADMIN — OXYBUTYNIN CHLORIDE SCH MG: 5 TABLET ORAL at 09:05

## 2021-04-29 RX ADMIN — HYDROMORPHONE HYDROCHLORIDE PRN MG: 1 INJECTION, SOLUTION INTRAMUSCULAR; INTRAVENOUS; SUBCUTANEOUS at 04:51

## 2021-04-29 RX ADMIN — METOPROLOL TARTRATE SCH MG: 25 TABLET ORAL at 04:52

## 2021-04-29 RX ADMIN — NICOTINE SCH MG: 14 PATCH TRANSDERMAL at 09:07

## 2021-04-29 RX ADMIN — LOSARTAN POTASSIUM SCH MG: 50 TABLET, FILM COATED ORAL at 09:07

## 2021-04-29 RX ADMIN — HEPARIN SODIUM SCH UNIT: 5000 INJECTION, SOLUTION INTRAVENOUS; SUBCUTANEOUS at 01:15

## 2021-04-29 RX ADMIN — HUMAN INSULIN SCH UNIT: 100 INJECTION, SOLUTION SUBCUTANEOUS at 09:06

## 2021-04-29 RX ADMIN — MEROPENEM SCH MLS: 1 INJECTION INTRAVENOUS at 10:01

## 2021-04-29 NOTE — P.DS
Admission Date: 04/20/21


Discharge Date: 04/29/21


Primary Care Provider: Dr. Noel


Disposition: ROUTINE DISCHARGE


Discharge Condition: GOOD


Reason for Admission: UTI, EROS, hydronephrosis


Consultations: 





Urology-Dr. Gallardo


Nephrology-Dr. Clark





Procedures: 





COVID: 


Negative





CT scan: 


FINDINGS:  The lower lung fields are clear. 


Imaged portions of the liver and spleen show no suspicious findings on non-

contrast imaging. The pancreas and adrenal glands are normal. No pathologic 

lymphadenopathy in the abdomen or pelvis. 


Severe bilateral hydroureter and hydronephrosis is present. The urinary bladder 

is distended and contains air. No obstructing renal calculus seen. 


No bowel obstruction, free air, free fluid or abscess. Normal appendix noted.Fat

containing bilateral inguinal hernias, larger on the right. 


No significant bony abnormality. 


IMPRESSION:  Severe bilateral hydroureter, hydronephrosis in urinary bladder 

distention is present. Air is also present in the bladder, which may be related 

to infection or recent instrumentation. A bladder outlet obstruction is 

suspected.


 


Renal US:


COMPARISON:  April 20, 2021 cat scan 


FINDINGS:  The right kidney measures 13 cm with a normal echotexture. 


The left kidney measures 13 cm with a normal echotexture. 


Marked bilateral hydronephrosis is present. 


A Newton catheter is present within a collapsed bladder 


IMPRESSION:  Marked bilateral hydronephrosis





ECHO:


MEASUREMENTS (cm)


   DIASTOLIC (NORMALS)              SYSTOLIC (NORMALS)


IVSd                 1.2 (0.6-1.2)                   LA Diam 2.8 (1.9-4.0)     

LVEF         56%  


LVIDd               3.5 (3.5-5.7)                       LVIDs      2.5 (2.0-3.5)

    %FS          28%


LVPWd             1.2 (0.6-1.2)


Ao Diam           3.2 (2.0-3.7)


2 DIMENSIONAL ASSESSMENT:











RIGHT ATRIUM:                    LEFT ATRIUM:       


 


RIGHT VENTRICLE:             LEFT VENTRICLE: 


 


TRICUSPID VALVE:              MITRAL VALVE:     


 


PULMONIC VALVE:              AORTIC VALVE:     


 


PERICARDIAL EFFUSION:  AORTIC ROOT:      





LEFT VENTRICULAR WALL MOTION:     


DOPPLER/COLOR FLOW:     


COMMENTS:      NORMAL 2-DIMENSIONAL ECHOCARDIOGRAM.  NORMAL WALL MOTION 

ABNORMALITY.  


                            NO EFFUSION.





Follow up Renal US: 


COMPARISON:  April 23, 2021 


FINDINGS:  The right kidney measures 13 cm with a normal echotexture. 


The left kidney measures 13 cm with a normal echotexture. 


Marked bilateral hydronephrosis is without significant change. 


The bladder is collapsed presumably secondary to a Newton catheter in place 


IMPRESSION:  No significant change in marked bilateral hydronephrosis





MAG3 Renogram: 


COMPARISON:  Renal Ultrasound-Complete dated 4/25/2021; Stone Protocol dated 

4/20/2021 


TECHNIQUE:   Following intravenous administration of 10.6 mCi Tc99m MAG-3, 

posterior dynamic angiogram and sequential static images of the kidneys were 

obtained. 40 mg Lasix was administered intravenously 10 minutes into the study. 

Static post-void imaging was also  performed.


 FINDINGS:


Severe bilateral hydronephrosis and hydroureter is present. 


Symmetric flow seen to both kidneys. The differential split function 

demonstrates 62% left renal function and 32% right renal function. 


Over time, the amount of radiotracer in both collecting systems is seen to 

increase gradually. Following the administration of Lasix, no significant 

diuresis is appreciated. This pattern suggests an anatomic obstruction rather 

than functional in nature. 


IMPRESSION:  Significant bilateral hydronephrosis and hydroureter is present 

likely attributable to anatomic obstruction given the lack of diuresis with 

Lasix.





Surgery: 


Date 4/26/2021


Surgeon:  JAIMIE GALLARDO


Preoperative Diagnoses:  


1.   Bilateral ureteral obstruction.


2.   Bilateral hydronephrosis.


3.   Large volume urinary retention.


4.   Acute on Chronic Kidney Injury


Postoperative Diagnoses:  


1.   Bilateral ureteral obstruction.


2.   Bilateral hydronephrosis.


3.   Large volume urinary retention.


4.   Acute on Chronic Kidney Injury


5.   Possible primary bladder neck dysfunction.


Procedures:  


1.   Cystoscopy.


2.   Bilateral retrograde pyelographies.


3.   Bilateral complicated ureteral stents placed.


4.   Urethral Newton catheter exchange.





Medical Problem List: 


Acute renal failure with bilateral ureteral obstruction, bilateral 

hydronephrosis, large volume urinary retention status post cystoscopy, bilateral

retrograde pyelogram phase, bilateral complicated ureteral stents placement, 

urethral Newton catheter exchange 


UTI, urine culture E. Coli-ESBL


Diabetes mellitus type 2 with hyperglycemia


Hypertension


Tobacco abuse


Obesity, BMI 39.9





Brief History of Present Illness: 





39 yo splenic male with history of diabetes, hypertension.  2 weeks ago patient 

started new medication for diabetes.  He began to have hematuria after 3 to 4 

days.  He was seen at [facility].  He was told that he had a severe UTI.  

Patient required IV antibiotic therapy but the patient left AGAINST MEDICAL 

ADVICE.  He was not taking any medications for this.  He continued to have night

sweats, bilateral flank pain, incontinence and difficulty walking.  He also 

reported some constipation.  Patient was seen and evaluated at the hospital.  

Patient found to have severe bilateral hydronephrosis with urinary bladder 

distention.  Catheter placed.  Patient admitted for further evaluation and 

treatment.  


Hospital Course: 





Patient presented with acute renal failure with bilateral ureteral obstruction, 

bilateral hydronephrosis, large volume urinary retention.  Patient had been seen

at another facility due to severe UTI.  Patient required IV antibiotic therapy 

at that time.  Patient refused and left AGAINST MEDICAL ADVICE.  Patient came to

our facility with worsening symptoms.  Patient was seen and evaluated by urology

and nephrology.  Repeat ultrasound showed bilateral hydronephrosis.  Patient was

found to have UTI with urine culture positive for E. coliESBL.  Patient was 

stabilized with IV antibiotic therapy.  Urology performed cystoscopy, bilateral 

retrograde pyelogram phase, bilateral complicated ureteral stent placement, and 

urethral Newton catheter exchange.  Patient has done well postsurgically.  Renal 

function has improved.  PICC line was placed in preparation for outpatient IV 

antibiotic therapy.  At discharge arrangements for IV Invanz 1 g for 14 days 

will be arranged through the help of the hospital as an outpatient.  Recommend 

to recheck urine culture after treatment to monitor resolution.  Also made 

arrangements for the patient to follow-up with a PCP to establish care.  This 

will be PCPDr. Driver.  He will follow antibiotics and monitor lab closely.  

Recommend to recheck labBMP at least every week while on antibiotics.  Patient 

will continue with Newton catheter.  Patient will need to follow-up with urology 

in 2 to 4 weeks.  At around 6 to 8 weeks urology will plan for repeat renal 

ultrasound and possibly MAG3 Lasix renogram.  This will help determine and 

evaluate the hydronephrosis.  At that time urology will determine to remove the 

ureteral stents.  Education address in detail.  Patient understands the 

importance of follow-up.  Patient plans to follow-up with plan of care.  Patient

will need to follow-up with nephrology in 1 to 2 weeks to follow-up 

hospitalization.  As stated above recommend to recheck labBMP at least every 

week to monitor his renal function back to baseline.





Patient with diabetes mellitus type 2 with hyperglycemia.  Hemoglobin A1c 

greater than 14.  Patient was placed on Lantus for better control.  This seems 

to be improved.  At discharge patient will need to monitor his blood sugars at 

least twice daily.  Recommend to maintain blood sugar less than 140 fasting and 

less than 200 after meals.  At discharge patient will continue with Lantus 50 

units subcu every bedtime.  Patient will also be provided NovoLog mild sliding 

scale.  If blood sugars remain above 200 patient may increase Lantus by 1 to 2 

units for better control.  Recommend follow-up with PCP to further monitor and 

adjust medication.  Education on diabetes, insulin will be provided.





Patient with hypertension.  Medications were adjusted during the course of his 

stay.  At discharge patient will continue with losartan 50 mg 1 pill twice 

daily, metoprolol 25 mg 1 pill twice daily, and hydrochlorothiazide 25 mg daily.

 Recommend to monitor blood pressure at least once daily.  Recommend to maintain

blood pressure less than 130/80.  Further adjustment can be done by his PCP or 

nephrology.





Patient with tobacco abuse.  Tobacco cessation addressed in detail.  Patient 

will be provided nicotine patch to help with cessation.





Patient with obesity, BMI 39.9.  Lifestyle modification education provided.  





Patient will also be provided a limited supply of tramadol 50 mg 1 pill 3 times 

a day as needed for pain.  Patient is not to use any nonsteroidal anti-

inflammatories like ibuprofen or Motrin in the future.  This may have 

contributed to his acute renal failure and hydronephrosis.  Patient may take 

Tylenol as needed for pain as well.  Patient will continue with folic acid daily

and docusate as a stool softener.





Vital Signs/Physical Exam: 














Temp Pulse Resp BP Pulse Ox


 


 97.6 F   83   17   125/79   96 


 


 04/29/21 04:00  04/29/21 04:52  04/29/21 05:21  04/29/21 04:52  04/29/21 05:21








General: Alert, In no apparent distress, Oriented x3, Cooperative


HEENT: Atraumatic


Neck: Supple


Respiratory: Clear to auscultation bilaterally, Normal air movement


Cardiovascular: Normal pulses, Regular rate/rhythm


Gastrointestinal: Normal bowel sounds, Soft and benign, Non-distended, No 

tenderness, No masses, No rebound, No guarding


Musculoskeletal: No tenderness, No warmth


Integumentary: No tenderness/swelling, No erythema


Neurological: Normal speech, Normal strength at 5/5 x4 extr, Normal tone, Normal

affect


External genitalia: Other (Newton catheter in place)


Laboratory Data at Discharge: 














WBC  11.10 K/uL (4.3-10.9)  H D 04/29/21  04:25    


 


Hgb  9.7 g/dL (13.6-17.9)  L  04/29/21  04:25    


 


Hct  28.7 % (39.6-49.0)  L  04/29/21  04:25    


 


Plt Count  330 K/uL (152-406)   04/29/21  04:25    


 


PT  12.1 SECONDS (9.5-12.5)   04/20/21  20:05    


 


INR  1.05   04/20/21  20:05    


 


Sodium  138 mmol/L (136-145)   04/29/21  04:25    


 


Potassium  3.9 mmol/L (3.5-5.1)   04/29/21  04:25    


 


BUN  29 mg/dL (7-18)  H  04/29/21  04:25    


 


Creatinine  1.67 mg/dL (0.55-1.3)  H  04/29/21  04:25    


 


Glucose  255 mg/dL ()  H  04/29/21  04:25    


 


Uric Acid  7.7 mg/dL (3.5-7.2)  H  04/21/21  05:37    


 


Phosphorus  3.7 mg/dL (2.5-4.9)   04/24/21  03:53    


 


Magnesium  1.9 mg/dL (1.8-2.4)   04/29/21  04:25    


 


Total Bilirubin  0.2 mg/dL (0.2-1.0)   04/24/21  03:53    


 


AST  9 U/L (15-37)  L  04/24/21  03:53    


 


ALT  10 U/L (12-78)  L  04/24/21  03:53    


 


Alkaline Phosphatase  96 U/L ()   04/24/21  03:53    


 


Triglycerides  553 mg/dL (<150)  H  04/21/21  05:05    


 


Cholesterol  96 mg/dL (<200)   04/21/21  05:05    


 


LDL Cholesterol Direct  29 mg/dL (100-129)  L  04/21/21  05:05    


 


HDL Cholesterol  20 mg/dL (40-60)  L  04/21/21  05:05    


 


Cholesterol/HDL Ratio  4.80   04/21/21  05:05    


 


Lipase  160 U/L ()   04/20/21  20:05    








Home Medications: 








Docusate [Colace Cap*] 100 mg PO DAILY #30 cap 04/29/21 


Folic Acid 1 mg PO DAILY #90 tablet 04/29/21 


Insulin Aspart [Novolog Flexpen] See Protocol SQ SEECOM #1 box 04/29/21 


Insulin Glargine Human [Lantus*] 50 units SQ BEDTIME #1 vial 04/29/21 


Losartan Potassium [Cozaar*] 50 mg PO BID #60 tablet 04/29/21 


Metoprolol Tartrate [Lopressor*] 25 mg PO BID 6AM 6PM #60 tab 04/29/21 


Nicotine [Nicoderm*] 14 mg TD DAILY #30 patch.td24 04/29/21 


Oxybutynin Chloride [Ditropan*] 5 mg PO BID #60 tab 04/29/21 


hydroCHLOROthiazide [Hydrochlorothiazide] 25 mg PO DAILY #30 tablet 04/29/21 


traMADol HCL [Ultram*] 50 mg PO TID PRN #10 tab 04/29/21 





New Medications: 


Docusate [Colace Cap*] 100 mg PO DAILY #30 cap


Losartan Potassium [Cozaar*] 50 mg PO BID #60 tablet


Oxybutynin Chloride [Ditropan*] 5 mg PO BID #60 tab


Folic Acid 1 mg PO DAILY #90 tablet


hydroCHLOROthiazide [Hydrochlorothiazide] 25 mg PO DAILY #30 tablet


Insulin Glargine Human [Lantus*] 50 units SQ BEDTIME #1 vial


Metoprolol Tartrate [Lopressor*] 25 mg PO BID 6AM 6PM #60 tab


Nicotine [Nicoderm*] 14 mg TD DAILY #30 patch.td24


Insulin Aspart [Novolog Flexpen] See Protocol SQ SEECOM #1 box


traMADol HCL [Ultram*] 50 mg PO TID PRN #10 tab


 PRN Reason: Pain Scale 2-4 (Mild)


Physician Discharge Instructions: 


Patient presented with acute renal failure with bilateral ureteral obstruction, 

bilateral hydronephrosis, large volume urinary retention.  Patient had been seen

 at another facility due to severe UTI.  Patient required IV antibiotic therapy 

at that time.  Patient refused and left AGAINST MEDICAL ADVICE.  Patient came to

 our facility with worsening symptoms.  Patient was seen and evaluated by 

urology and nephrology.  Repeat ultrasound showed bilateral hydronephrosis.  

Patient was found to have UTI with urine culture positive for E. coliESBL.  

Patient was stabilized with IV antibiotic therapy.  Urology performed 

cystoscopy, bilateral retrograde pyelogram phase, bilateral complicated ureteral

 stent placement, and urethral Newton catheter exchange.  Patient has done well 

postsurgically.  Renal function has improved.  PICC line was placed in 

preparation for outpatient IV antibiotic therapy.  At discharge arrangements for

 IV Invanz 1 g for 14 days will be arranged through the help of the hospital as 

an outpatient.  Recommend to recheck urine culture after treatment to monitor 

resolution.  Also made arrangements for the patient to follow-up with a PCP to 

establish care.  This will be Emeka Driver.  He will follow antibiotics and 

monitor lab closely.  Recommend to recheck labBMP at least every week while on 

antibiotics.  Patient will continue with Newton catheter.  Patient will need to 

follow-up with urology in 2 to 4 weeks.  At around 6 to 8 weeks urology will 

plan for repeat renal ultrasound and possibly MAG3 Lasix renogram.  This will 

help determine and evaluate the hydronephrosis.  At that time urology will 

determine to remove the ureteral stents.  Education address in detail.  Patient 

understands the importance of follow-up.  Patient plans to follow-up with plan 

of care.  Patient will need to follow-up with nephrology in 1 to 2 weeks to 

follow-up hospitalization.  As stated above recommend to recheck labBMP at 

least every week to monitor his renal function back to baseline.





Patient with diabetes mellitus type 2 with hyperglycemia.  Hemoglobin A1c 

greater than 14.  Patient was placed on Lantus for better control.  This seems 

to be improved.  At discharge patient will need to monitor his blood sugars at 

least twice daily.  Recommend to maintain blood sugar less than 140 fasting and 

less than 200 after meals.  At discharge patient will continue with Lantus 50 

units subcu every bedtime.  Patient will also be provided NovoLog mild sliding 

scale.  If blood sugars remain above 200 patient may increase Lantus by 1 to 2 

units for better control.  Recommend follow-up with PCP to further monitor and 

adjust medication.  Education on diabetes, insulin will be provided.





Patient with hypertension.  Medications were adjusted during the course of his 

stay.  At discharge patient will continue with losartan 50 mg 1 pill twice 

daily, metoprolol 25 mg 1 pill twice daily, and hydrochlorothiazide 25 mg daily.

  Recommend to monitor blood pressure at least once daily.  Recommend to maint

ain blood pressure less than 130/80.  Further adjustment can be done by his PCP 

or nephrology.





Patient with tobacco abuse.  Tobacco cessation addressed in detail.  Patient 

will be provided nicotine patch to help with cessation.





Patient with obesity, BMI 39.9.  Lifestyle modification education provided.  





Patient will also be provided a limited supply of tramadol 50 mg 1 pill 3 times 

a day as needed for pain.  Patient is not to use any nonsteroidal anti-

inflammatories like ibuprofen or Motrin in the future.  This may have 

contributed to his acute renal failure and hydronephrosis.  Patient may take 

Tylenol as needed for pain as well.  Patient will continue with folic acid daily

 and docusate as a stool softener.





Diet: ADA


Activity: Ad suzanne


Followup: 


NONE,NONE [Primary Care Provider] - 


Time spent managing pt's care (in minutes): 55

## 2021-04-29 NOTE — P.PN
Date of Service: 04/29/21


Vital Signs











Temp Pulse Resp BP Pulse Ox


 


 97.2 F   74   18   127/64   97 


 


 04/29/21 08:00  04/29/21 08:00  04/29/21 08:00  04/29/21 08:00  04/29/21 08:00





Microbiology Results





04/20/21 20:40   Blood  - Blood   Aerobic Blood Culture - Final


                            No growth in 5 days.


04/20/21 20:40   Blood  - Blood   Anaerobic Blood Culture - Final


                            No growth in 5 days.


04/20/21 20:25   Blood  - Blood   Aerobic Blood Culture - Final


                            No growth in 5 days.


04/20/21 20:25   Blood  - Blood   Anaerobic Blood Culture - Final


                            No growth in 5 days.


04/20/21 20:10   Clean Catch Urine   [place] Count - Final


                            >100,000 CFU/ML.


04/20/21 20:10   Clean Catch Urine    - Final


                            Escherichia Coli Esbl





Assessment/ Plan: 


Nephrology





No acute cardiac or pulmonary complaints.


No CP or SOB.


No acute events overnight.





Vitals, medications, blood work and imaging reviewed in the chart.


General: Alert, Oriented x3, Cooperative


HEENT: Atraumatic


Neck: Supple


Respiratory: Clear to auscultation bilaterally


Cardiovascular: Regular rate/rhythm, No edema


Gastrointestinal: Soft and benign, Non-distended, Tenderness


Musculoskeletal: No clubbing, No contractures


Integumentary: No rashes, No cyanosis


Neurological: Normal speech





Laboratory Data (last 24 hrs)


04/20/21 20:05: PT 12.1, INR 1.05


04/20/21 20:05: WBC 13.40 H, Hgb 10.7 L, Hct 31.7 L, Plt Count 456 H


04/20/21 20:05: Sodium 126 L, Potassium 3.9, BUN 37 H, Creatinine 3.27 H, 

Glucose 775 H*, Magnesium 2.2, Total Bilirubin 0.2, AST 8 L, ALT 12, Alkaline 

Phosphatase 199 H, Lipase 160





Imagings Data: 


EXAM DESCRIPTION:  RAD - Chest Single View - 4/20/2021 8:06 pm


CLINICAL HISTORY:  SWELLING


Chest pain.


COMPARISON:  No comparisons


FINDINGS:  Portable technique limits examination quality.


Mild interstitial pulmonary edema suspected. The heart is upper limit normal in 

size with a tortuous thoracic aorta. No displaced fractures.








EXAM DESCRIPTION:  CT - Stone Protocol - 4/20/2021 8:15 pm


CLINICAL HISTORY:  Flank pain.


HEMATURIA


COMPARISON:  No comparisons


TECHNIQUE:  Axial images were obtained without oral or IV contrast. Lack of 

contrast limits solid organ and vascular assessment. The field-of-view spans the

entirety of the  system partially obscuring uppermost abdomen and lung bases. 

Coronal reformatted images were obtained and reviewed.


All CT scans are performed using dose optimization technique as appropriate and 

may include automated exposure control or mA/KV adjustment according to patient 

size.


FINDINGS:  The lower lung fields are clear.


Imaged portions of the liver and spleen show no suspicious findings on non-

contrast imaging. The pancreas and adrenal glands are normal. No pathologic 

lymphadenopathy in the abdomen or pelvis.


Severe bilateral hydroureter and hydronephrosis is present. The urinary bladder 

is distended and contains air. No obstructing renal calculus seen.


No bowel obstruction, free air, free fluid or abscess. Normal appendix noted.Fat

containing bilateral inguinal hernias, larger on the right.


No significant bony abnormality.


IMPRESSION:  Severe bilateral hydroureter, hydronephrosis in urinary bladder 

distention is present. Air is also present in the bladder, which may be related 

to infection or recent instrumentation. A bladder outlet obstruction is 

suspected.





Conclusions/Impression: 


A/P:  Continue the current POC and Medications other than the changes listed.  

AM Labs PRN.  Recommend daily weight.  Please see the orders for complete 

details.





EROS likely due to excessive NSAIDs complicated by urinary obstruction


CKD (Baseline CKD is unclear)


-No NSAIDs


-Continue de oliveira





Bladder outlet obstruction with BL hydronephrosis and hydroureter


-Continue de oliveira


-Follow up with urology





Hyponatremia resolved





Hypokalemia resolved





Hypomagnesemia


-Replete with IV mag





HTN with tachycardia


-Continue Losartan BID





LE Edema


-Continue HCTZ





DM II with CKD


-Continue Lantus


-Continue RISS





Moderate malnutrition


-Encourage nutrition





Anemia in chronic illness


-Monitor H&H





Acute E.coli cystitis


-Continue Meropenem








Case reviewed with Dr. Lan

## 2021-05-04 NOTE — P.PN
Date of Service: 05/04/21








Subjective


Received call from same-day surgery as patient unable to afford his Lantus.  

Called in insulin 70/30 40 units in the morning and 20 units at night.  Patient 

also needs glucometer and supplies to check his blood sugars and will approve 

nursing staff in same-day surgery call him this in as well.

## 2021-05-13 NOTE — P.PN
Subjective


Date of Service: 05/13/21


Primary Care Provider: Dr. Noel


Chief Complaint: UTI, EROS, hydronephrosis


Subjective: Other (Patient was seen in day surgery.  Today is his last day of IV

Invanz.  Patient doing well at this time.  Patient following up with urology.  P

atient with good urine stream.  No fever, chills.)





Physical Examination





- Vital Signs


Temperature: 97.2 F


Blood Pressure: 127/64


Pulse: 74


Respirations: 18


Pulse Ox (%): 97





- Physical Exam


General: Alert, In no apparent distress, Oriented x3, Cooperative


HEENT: Atraumatic


Neck: Supple


Respiratory: Clear to auscultation bilaterally, Normal air movement


Cardiovascular: Normal pulses, Regular rate/rhythm


Gastrointestinal: Normal bowel sounds, No ascites, No tenderness, No masses, No 

rebound, No guarding


Integumentary: No tenderness/swelling


Neurological: Normal speech, Normal strength at 5/5 x4 extr, Normal tone, Normal

affect





- Studies


Medications List Reviewed: Yes





Assessment & Plan


Discharge Plan: Home


Physician Review Additional Text: 














Impression: 


Acute renal failure with bilateral ureteral obstruction, bilateral 

hydronephrosis, large volume urinary retention status post cystoscopy, bilateral

retrograde pyelogram phase, bilateral complicated ureteral stents placement, 

urethral Newton catheter exchange 


UTI, urine culture E. Coli-ESBL


Diabetes mellitus type 2 with hyperglycemia


Hypertension


Tobacco abuse


Obesity, BMI 39.9





Plan:


Acute renal failure with bilateral ureteral obstruction, bilateral 

hydronephrosis, large volume urinary retention status post cystoscopy, bilateral

retrograde pyelogram phase, bilateral complicated ureteral stents placement, 

urethral Newton catheter exchange: Patient was seen in day surgery.  Today is his

last day of Invanz.  He has completed 14 days treatment of this.  Recent urine 

culture was negative.  Case discussed with urology.  Patient will follow up with

urology later today.  Patient with good urinary stream.  Urology plans to 

recheck renal ultrasound to evaluate for hydronephrosis and the possibility of 

removal of stents.  Prior to discharge from day surgery PICC line will be remove

d.  Patient will continue with follow-up care with urology.


UTI, urine culture E. Coli-ESBL: PICC line will be removed.  Patient has 

completed IV Invanz for 14 days.


Diabetes mellitus type 2 with hyperglycemia: Continue with diabetic regimen for 

diabetic control.  Patient is following up with PCP to further address.


Hypertension: Continue blood pressure medication losartan, hydrochlorothiazide 

and metoprolol.  Blood pressure stable.


Tobacco abuse: Continue nicotine patch


Obesity, BMI 39.9: Continue with lifestyle modification education.


Time Spent Managing Pts Care (In Minutes): 30

## 2021-06-09 ENCOUNTER — HOSPITAL ENCOUNTER (INPATIENT)
Dept: HOSPITAL 97 - ER | Age: 41
LOS: 7 days | Discharge: HOME | DRG: 683 | End: 2021-06-16
Payer: SELF-PAY

## 2021-06-09 VITALS — BODY MASS INDEX: 39.4 KG/M2

## 2021-06-09 DIAGNOSIS — R33.9: ICD-10-CM

## 2021-06-09 DIAGNOSIS — Z91.14: ICD-10-CM

## 2021-06-09 DIAGNOSIS — E87.6: ICD-10-CM

## 2021-06-09 DIAGNOSIS — Z20.822: ICD-10-CM

## 2021-06-09 DIAGNOSIS — Z79.4: ICD-10-CM

## 2021-06-09 DIAGNOSIS — D64.9: ICD-10-CM

## 2021-06-09 DIAGNOSIS — B96.20: ICD-10-CM

## 2021-06-09 DIAGNOSIS — E87.1: ICD-10-CM

## 2021-06-09 DIAGNOSIS — E11.65: ICD-10-CM

## 2021-06-09 DIAGNOSIS — Z16.12: ICD-10-CM

## 2021-06-09 DIAGNOSIS — N17.9: Primary | ICD-10-CM

## 2021-06-09 DIAGNOSIS — E66.9: ICD-10-CM

## 2021-06-09 DIAGNOSIS — I10: ICD-10-CM

## 2021-06-09 DIAGNOSIS — N39.0: ICD-10-CM

## 2021-06-09 LAB
ALBUMIN SERPL BCP-MCNC: 3 G/DL (ref 3.4–5)
ALP SERPL-CCNC: 170 U/L (ref 45–117)
ALT SERPL W P-5'-P-CCNC: 11 U/L (ref 12–78)
AST SERPL W P-5'-P-CCNC: 5 U/L (ref 15–37)
BUN BLD-MCNC: 50 MG/DL (ref 7–18)
GLUCOSE SERPLBLD-MCNC: 578 MG/DL (ref 74–106)
HCT VFR BLD CALC: 35.5 % (ref 39.6–49)
LIPASE SERPL-CCNC: 154 U/L (ref 73–393)
LYMPHOCYTES # SPEC AUTO: 2.5 K/UL (ref 0.7–4.9)
PMV BLD: 8.5 FL (ref 7.6–11.3)
POTASSIUM SERPL-SCNC: 4.2 MMOL/L (ref 3.5–5.1)
RBC # BLD: 4.05 M/UL (ref 4.33–5.43)

## 2021-06-09 PROCEDURE — 82570 ASSAY OF URINE CREATININE: CPT

## 2021-06-09 PROCEDURE — 81001 URINALYSIS AUTO W/SCOPE: CPT

## 2021-06-09 PROCEDURE — 81003 URINALYSIS AUTO W/O SCOPE: CPT

## 2021-06-09 PROCEDURE — 80076 HEPATIC FUNCTION PANEL: CPT

## 2021-06-09 PROCEDURE — 96375 TX/PRO/DX INJ NEW DRUG ADDON: CPT

## 2021-06-09 PROCEDURE — 87040 BLOOD CULTURE FOR BACTERIA: CPT

## 2021-06-09 PROCEDURE — 99285 EMERGENCY DEPT VISIT HI MDM: CPT

## 2021-06-09 PROCEDURE — 80202 ASSAY OF VANCOMYCIN: CPT

## 2021-06-09 PROCEDURE — 84145 PROCALCITONIN (PCT): CPT

## 2021-06-09 PROCEDURE — 96365 THER/PROPH/DIAG IV INF INIT: CPT

## 2021-06-09 PROCEDURE — 80069 RENAL FUNCTION PANEL: CPT

## 2021-06-09 PROCEDURE — 87077 CULTURE AEROBIC IDENTIFY: CPT

## 2021-06-09 PROCEDURE — 84550 ASSAY OF BLOOD/URIC ACID: CPT

## 2021-06-09 PROCEDURE — 84100 ASSAY OF PHOSPHORUS: CPT

## 2021-06-09 PROCEDURE — 83935 ASSAY OF URINE OSMOLALITY: CPT

## 2021-06-09 PROCEDURE — 74176 CT ABD & PELVIS W/O CONTRAST: CPT

## 2021-06-09 PROCEDURE — 83690 ASSAY OF LIPASE: CPT

## 2021-06-09 PROCEDURE — 36415 COLL VENOUS BLD VENIPUNCTURE: CPT

## 2021-06-09 PROCEDURE — 87205 SMEAR GRAM STAIN: CPT

## 2021-06-09 PROCEDURE — 83605 ASSAY OF LACTIC ACID: CPT

## 2021-06-09 PROCEDURE — 96366 THER/PROPH/DIAG IV INF ADDON: CPT

## 2021-06-09 PROCEDURE — 80048 BASIC METABOLIC PNL TOTAL CA: CPT

## 2021-06-09 PROCEDURE — 83930 ASSAY OF BLOOD OSMOLALITY: CPT

## 2021-06-09 PROCEDURE — 81015 MICROSCOPIC EXAM OF URINE: CPT

## 2021-06-09 PROCEDURE — 96372 THER/PROPH/DIAG INJ SC/IM: CPT

## 2021-06-09 PROCEDURE — 82947 ASSAY GLUCOSE BLOOD QUANT: CPT

## 2021-06-09 PROCEDURE — 83880 ASSAY OF NATRIURETIC PEPTIDE: CPT

## 2021-06-09 PROCEDURE — 82435 ASSAY OF BLOOD CHLORIDE: CPT

## 2021-06-09 PROCEDURE — 84300 ASSAY OF URINE SODIUM: CPT

## 2021-06-09 PROCEDURE — 80053 COMPREHEN METABOLIC PANEL: CPT

## 2021-06-09 PROCEDURE — 87088 URINE BACTERIA CULTURE: CPT

## 2021-06-09 PROCEDURE — 86140 C-REACTIVE PROTEIN: CPT

## 2021-06-09 PROCEDURE — 87186 SC STD MICRODIL/AGAR DIL: CPT

## 2021-06-09 PROCEDURE — 71045 X-RAY EXAM CHEST 1 VIEW: CPT

## 2021-06-09 PROCEDURE — 36569 INSJ PICC 5 YR+ W/O IMAGING: CPT

## 2021-06-09 PROCEDURE — 87086 URINE CULTURE/COLONY COUNT: CPT

## 2021-06-09 PROCEDURE — 85025 COMPLETE CBC W/AUTO DIFF WBC: CPT

## 2021-06-09 PROCEDURE — 84132 ASSAY OF SERUM POTASSIUM: CPT

## 2021-06-09 PROCEDURE — 51702 INSERT TEMP BLADDER CATH: CPT

## 2021-06-09 PROCEDURE — 84478 ASSAY OF TRIGLYCERIDES: CPT

## 2021-06-09 PROCEDURE — 84156 ASSAY OF PROTEIN URINE: CPT

## 2021-06-09 PROCEDURE — 83735 ASSAY OF MAGNESIUM: CPT

## 2021-06-09 PROCEDURE — 76377 3D RENDER W/INTRP POSTPROCES: CPT

## 2021-06-09 RX ADMIN — HYDROCODONE BITARTRATE AND ACETAMINOPHEN PRN TAB: 7.5; 325 TABLET ORAL at 20:05

## 2021-06-09 RX ADMIN — SODIUM CHLORIDE SCH: 0.9 INJECTION, SOLUTION INTRAVENOUS at 21:59

## 2021-06-09 RX ADMIN — MORPHINE SULFATE PRN MG: 2 INJECTION, SOLUTION INTRAMUSCULAR; INTRAVENOUS at 22:05

## 2021-06-09 RX ADMIN — HUMAN INSULIN SCH UNIT: 100 INJECTION, SOLUTION SUBCUTANEOUS at 21:59

## 2021-06-09 NOTE — EDPHYS
Physician Documentation                                                                           

 [facility]                                                                 

Name: Julio Cesar Reeves                                                                                

Age: 40 yrs                                                                                       

Sex: Male                                                                                         

: 1980                                                                                   

MRN: [id number]                                                                                   

Arrival Date: 2021                                                                          

Time: 15:48                                                                                       

Account#: [id number]                                                                            

Bed 19                                                                                            

Private MD:                                                                                       

ED Physician Tima Little                                                                         

HPI:                                                                                              

                                                                                             

16:25 This 40 yrs old  Male presents to ER via Wheelchair with complaints of Urinary  jmm 

      Problem, Hand Swelling, Feet Swelling.                                                      

16:25 The patient presents with urinary symptoms, dysuria. Onset: The symptoms/episode        jmm 

      began/occurred gradually, 2 week(s) ago. Modifying factors: The symptoms are alleviated     

      by nothing, the symptoms are aggravated by nothing. Associated signs and symptoms:          

      Pertinent positives: dysuria. This is a 40 year old male with a history of DM, HTN that     

      presents to the ED with complaints of right flank pain, dysuria, increased pain over        

      the past 2 weeks. Denies fever. Similar symptoms when diagnosed with uti and kidney         

      obstruction. Stent placed in the kidneys. .                                                 

                                                                                                  

Historical:                                                                                       

- Allergies:                                                                                      

16:02 No Known Allergies;                                                                     jl7 

- PMHx:                                                                                           

16:02 Diabetes - NIDDM; Hypertension; kidney infection;                                       jl7 

                                                                                                  

- Immunization history:: Adult Immunizations up to date, Client reports having NOT                

  received the Covid vaccine.                                                                     

- Social history:: Smoking status: Patient reports the use of cigarette tobacco                   

  products, smokes one pack cigarettes per day.                                                   

                                                                                                  

                                                                                                  

ROS:                                                                                              

16:25 Constitutional: Negative for fever, chills, and weight loss, Cardiovascular: Negative   jmm 

      for chest pain, palpitations, and edema, Respiratory: Negative for shortness of breath,     

      cough, wheezing, and pleuritic chest pain.                                                  

16:25 Abdomen/GI: Positive for abdominal pain.                                                    

16:25 : Positive for urinary symptoms.                                                          

16:25 All other systems are negative.                                                             

                                                                                                  

Exam:                                                                                             

16:25 Constitutional:  This is a well developed, well nourished patient who is awake, alert,  jmm 

      and in no acute distress. Head/Face:  atraumatic. Eyes:  EOMI, no conjunctival erythema     

      appreciated ENT:  Moist Mucus Membranes Neck:  Trachea midline, Supple Chest/axilla:        

      Normal chest wall appearance and motion.   Cardiovascular:  Regular rate and rhythm.        

      No edema appreciated Respiratory:  Normal respirations, no respiratory distress             

      appreciated Abdomen/GI:  Non distended, soft Back:  Normal ROM Skin:  General               

      appearance color normal MS/ Extremity:  Moves all extremities, no obvious deformities       

      appreciated, no edema noted to the lower extremities  Neuro:  Awake and alert, normal       

      gait Psych:  Behavior is normal, Mood is normal, Patient is cooperative and pleasant        

                                                                                                  

Vital Signs:                                                                                      

15:55  / 107; Pulse 105; Resp 17 S; Temp 97.5(TE); Pulse Ox 100% on R/A; Weight 108.86  jl7 

      kg; Height 5 ft. 4 in. (162.56 cm); Pain 10/10;                                             

17:35  / 80; Pulse 101; Resp 17 S; Pulse Ox 97% on R/A;                                 jd3 

19:40  / 68; Pulse 112; Resp 20; Temp 97.4; Pulse Ox 99% on R/A;                        lc1 

22:03  / 86; Pulse 111; Resp 18; Pulse Ox 99% on R/A;                                   lc1 

23:06  / 89; Pulse 101; Resp 18; Pulse Ox 99% on R/A;                                   lc1 

15:55 Body Mass Index 41.20 (108.86 kg, 162.56 cm)                                            jl7 

                                                                                                  

MDM:                                                                                              

16:25 Patient medically screened.                                                             [facility] 

19:52 Data reviewed: vital signs, nurses notes. Counseling: I had a detailed discussion with  [facility] 

      the patient and/or guardian regarding: the historical points, exam findings, and any        

      diagnostic results supporting the discharge/admit diagnosis, lab results, radiology         

      results, the need for further work-up and treatment in the hospital. ED course: I           

      discussed the patient with Dean Viramontes and Dr. Gallardo whom accepted the patient for         

      admission.                                                                                  

                                                                                                  

                                                                                             

16:25 Order name: Basic Metabolic Panel; Complete Time: 18:16                                 [facility] 

                                                                                             

16:25 Order name: CBC with Diff; Complete Time: 17:38                                         [facility] 

                                                                                             

16:25 Order name: Hepatic Function; Complete Time: 18:16                                      [facility] 

                                                                                             

16:25 Order name: Lipase; Complete Time: 18:16                                                [facility] 

                                                                                             

16:25 Order name: Urine Culture                                                               [facility] 

                                                                                             

17:13 Order name: Urine Dipstick-Ancillary; Complete Time: 17:14                              [facility]

                                                                                             

17:22 Order name: Procalcitonin; Complete Time: 18:31                                         [facility] 

                                                                                             

17:22 Order name: Lactate                                                                     [facility] 

                                                                                             

17:22 Order name: Blood Culture Adult (2)                                                     [facility] 

                                                                                             

18:18 Order name: CT Stone Protocol; Complete Time: 19:33                                     [facility] 

                                                                                             

22:24 Order name: SARS-COV-2 RT PCR                                                           [facility]

                                                                                             

23:42 Order name: Glucose, Ancillary Testing                                                  [facility]

                                                                                             

16:25 Order name: IV Saline Lock; Complete Time: 17:32                                        [facility] 

                                                                                             

16:25 Order name: Labs collected and sent; Complete Time: 17:32                               [facility] 

                                                                                             

16:25 Order name: Urine Dipstick-Ancillary (obtain specimen); Complete Time: 17:32            [facility] 

                                                                                             

17:32 Order name: Cath; Complete Time: 17:35                                                  jd3 

                                                                                                  

Administered Medications:                                                                         

17:32 Drug: morphine 4 mg Route: IVP; Site: left antecubital;                                 jd3 

20:22 Follow up: Response: No adverse reaction                                                lc1 

17:32 Drug: Zofran (Ondansetron) 4 mg Route: IVP; Site: left antecubital;                     jd3 

20:22 Follow up: Response: No adverse reaction                                                lc1 

19:20 Drug: NS 0.9% 1000 ml Route: IV; Rate: 1000 ml; Site: left antecubital;                 lc1 

23:14 Follow up: IV Status: Completed infusion                                                lc1 

19:20 Drug: Insulin Regular Human 10 units {Co-Signature: nataliya (Mahendra Cavanaugh RN).} Route:    lc1 

      Sub-Q; Site: left upper abdomen;                                                            

19:29 Follow up: Response: No adverse reaction                                                lc1 

19:20 Drug: Meropenem 1 grams Route: IV; Rate: calculated rate; Site: left antecubital;       lc1 

23:14 Follow up: IV Status: Completed infusion                                                lc1 

19:20 Drug: NS 0.9% 1000 ml Route: IV; Rate: 125 ml/hr; Site: left antecubital;               lc1 

23:14 Follow up: IV Status: Infusion continued                                                lc1 

19:22 Drug: Insulin Regular Human 10 units {Co-Signature: nataliya (Mahendra Cavanaugh RN).} Route:    lc1 

      IVP; Site: left antecubital;                                                                

19:29 Follow up: Response: No adverse reaction                                                lc1 

19:40 Drug: morphine 4 mg Route: IVP; Site: left antecubital;                                 lc1 

20:22 Follow up: Response: No adverse reaction                                                lc1 

                                                                                                  

                                                                                                  

Disposition:                                                                                      

21 19:58 Hospitalization ordered by Sreekanth Little for Inpatient Admission. Preliminary     

  diagnosis are Acute Kidney Injury, UTI.                                                         

- Bed requested for Telemetry/MedSurg (observation).                                              

- Status is Inpatient Admission.                                                              lc1 

- Condition is Stable.                                                                            

- Problem is new.                                                                                 

- Symptoms are unchanged.                                                                         

                                                                                                  

                                                                                                  

                                                                                                  

Addendum:                                                                                         

2021                                                                                        

     07:04 Co-signature as Attending Physician, Tima Little MD.                                    r
n

                                                                                                  

Signatures:                                                                                       

Dispatcher [employer]                           [facility]                                                 

Curtis Fallon PA PA jmm Nieto, Roman, MD MD rn Calhoun, Lisa                                lc1                                                  

Dean Viramontes, FNP-C                      FNP-Cla1                                                  

Radha Hutton, RN                      RN   tl1                                                  

Mable Berger RN                        RN   jl7                                                  

Reynold Tan RN                    RN   jd3                                                  

Mahendra Cavanaugh RN                            jm8                                                  

                                                                                                  

Corrections: (The following items were deleted from the chart)                                    

                                                                                             

21:22 20:15 CORONAVIRUS+MR.LAB.BRZ ordered. [facility]

22:32 19:58 Hospitalization Ordered by Sreekanth Little MD for Inpatient Admission. Preliminary  tl1 

      diagnosis is Acute Kidney Injury; UTI. Bed requested for Telemetry/MedSurg                  

      (observation). Status is Inpatient Admission. Condition is Stable. Problem is new.          

      Symptoms are unchanged. [facility]                                                                 

22:32 22:32 2021 19:58 Hospitalization Ordered by Sreekanth Little MD for Inpatient        tl1 

      Admission. Preliminary diagnosis is Acute Kidney Injury; UTI. Bed requested for             

      Telemetry/MedSurg (observation). Status is Inpatient Admission. Condition is Stable.        

      Problem is new. Symptoms are unchanged. [facility]                                                 

22:39 22:32 2021 19:58 Hospitalization Ordered by Sreekanth Little MD for Inpatient        tl1 

      Admission. Preliminary diagnosis is Acute Kidney Injury; UTI. Bed requested for             

      Telemetry/MedSurg (observation). Status is Inpatient Admission. Condition is Stable.        

      Problem is new. Symptoms are unchanged. 1                                                 

06/10                                                                                             

00:13  22:39 2021 19:58 Hospitalization Ordered by Sreekanth Little MD for Inpatient  lc1 

      Admission. Preliminary diagnosis is Acute Kidney Injury; UTI. Bed requested for             

      Telemetry/MedSurg (observation). Status is Inpatient Admission. Condition is Stable.        

      Problem is new. Symptoms are unchanged. tl1                                                 

                                                                                                  

**************************************************************************************************

## 2021-06-09 NOTE — RAD REPORT
EXAM DESCRIPTION:  CT - Stone Protocol - 6/9/2021 7:06 pm

 

CLINICAL HISTORY:  Flank pain.

FLANK PAIN

 

COMPARISON:  Stone Protocol dated 4/20/2021; Kidney Imag W/Flow F W dated 4/26/2021; Chest Single Vie
w dated 4/28/2021; Cystography dated 4/26/2021

 

TECHNIQUE:  Axial images were obtained without oral or IV contrast. Lack of contrast limits solid org
an and vascular assessment. The field-of-view spans the entirety of the  system partially obscuring
 uppermost abdomen and lung bases. Coronal reformatted images were obtained and reviewed.

 

All CT scans are performed using dose optimization technique as appropriate and may include automated
 exposure control or mA/KV adjustment according to patient size.

 

FINDINGS:  The lower lung fields are clear.

 

Imaged portions of the liver and spleen show no suspicious findings on non-contrast imaging. The panc
reas and adrenal glands are normal. No pathologic lymphadenopathy in the abdomen or pelvis.

 

Bilateral double-J stents are in place. The proximal and distal aspects of both stents are in expecte
d positioning. Mild caliectasis of the upper pole moieties bilaterally. Newton catheter is present wit
hin the urinary bladder. Thickening of the bladder wall is also seen.

 

Mildly prominent lymph nodes are evident in the pelvis including the perirectal fat, measuring up to 
11 mm. Several mildly enlarged lymph nodes are also seen along both iliac chain the periaortic region
 on the left measuring 21 mm.

 

No bowel obstruction, free air, free fluid or abscess. Normal appendix noted.

 

No significant bony abnormality. Fat containing bilateral inguinal hernias are seen, larger on the ri
ght.

 

IMPRESSION:  Bilateral double-J stents are in place. Stents are in expected positioning. Mild caliect
asis involving the upper pole renal calices bilaterally seen. No significant hydronephrosis felt to b
e present.

 

Urinary bladder appears thickened with a Newton catheter in place.

 

Nonspecific lymphadenopathy is seen in the retroperitoneum and pelvis as described.

## 2021-06-09 NOTE — P.HP
Certification for Inpatient


Patient admitted to: Inpatient


With expected LOS: >2 Midnights


Patient will require the following post-hospital care: None


Practitioner: I am a practitioner with admitting privileges, knowledge of 

patient current condition, hospital course, and medical plan of care.


Services: Services provided to patient in accordance with Admission requirements

found in Title 42 Section 412.3 of the Code of Federal Regulations





Patient History


Date of Service: 06/09/21


Primary Care Provider: Dr. Jack


Reason for admission: Acute renal failure, UTI


History of Present Illness: 


40-year-old  male with history of diabetes mellitus type 2, hypertension

presents emergency department for urinary retention, back pain, suprapubic pain.

 Patient with recent admission for urinary retention, hydronephrosis, acute 

renal failure, UTI with ESBL, patient was treated with IV meropenem, ureteral 

stents were placed bilaterally, patient had Newton catheter throughout admission 

was discharged with catheter.  Patient followed up with neurology on outpatient 

basis but did not agree with plan of care/was not compliant with additional 

evaluation, patient was due to have his ureteral stents removed and urology 

offer to remove them last week for no cost but patient refused as he has not 

offered general sedation.  Patient was evaluated in the emergency department, 

labs significant for white blood cell count 13.9 hemoglobin 11.9 sodium 129 

creatinine 2.93 GFR 24 BUN 50 glucose 578 pro calcitonin 15.48.  UA significant 

for 3+ leukocytes.  Case was discussed with urology, as patient is noncompliant 

with plan of care he did not have much to offer aside from offering to remove 

patient's ureteral stents which he suggested did need to be removed at some 

point.  ED provider wishes to admit for further evaluation and management of 

acute renal failure, UTI.





When I saw the patient in the ER he was awake, alert, oriented x3.  Patient is 

disgruntled, patient reports that he has been without insurance and also did not

agree with urology plan of care, has been unable to follow up with any other 

urologist as he did not have any insurance.  Discussed with patient at length 

his condition and that without further evaluation from urology after his acute 

renal failure/UTI was addressed this would likely be a recurrent issue leading 

to the necessity of possible dialysis verses severe infections sepsis/death.  

Patient verbalized understanding reports that he is working on trying to get 

additional healthcare coverage to see another urologist.  Patient not amendable 

to having his ureteral stent removed during this hospitalization. 





Allergies





No Known Allergies Allergy (Verified 04/21/21 00:25)


   





Home Medications: 








Docusate [Colace Cap*] 100 mg PO DAILY #30 cap 04/29/21 


Folic Acid 1 mg PO DAILY #90 tablet 04/29/21 


Losartan Potassium [Cozaar*] 50 mg PO BID #60 tablet 04/29/21 


Metoprolol Tartrate [Lopressor*] 25 mg PO BID 6AM 6PM #60 tab 04/29/21 


Nicotine [Nicoderm*] 14 mg TD DAILY #30 patch.td24 04/29/21 


Oxybutynin Chloride [Ditropan*] 5 mg PO BID #60 tab 04/29/21 


hydroCHLOROthiazide [Hydrochlorothiazide] 25 mg PO DAILY #30 tablet 04/29/21 


traMADol HCL [Ultram*] 50 mg PO TID PRN #10 tab 04/29/21 


Nf Relion 70/30 20 units SQ DAILY AT SUPPER 05/04/21 


Relion  70/30 40 units SQ BREAKFAST 05/04/21 








- Past Medical/Surgical History


Diabetic: Yes


-: DM


-: HTN


-: Kidney Infection


-: Urinary retention


-: Ureteral stents bilaterally


Psychosocial/ Personal History: Unemployed, lives alone





- Family History


  ** Mother


-: Diabetes





  ** Father


-: Diabetes





- Social History


Smoking Status: Current every day smoker


Alcohol use: No


CD- Drugs: No


Caffeine use: No


Place of Residence: Home





Review of Systems


General: Chills, Weakness, Malaise


Gastrointestinal: Abdominal Pain


Musculoskeletal: Back Pain





Physical Examination





- Physical Exam


General: Alert, In no apparent distress, Oriented x3


HEENT: Atraumatic, PERRLA, Mucous membr. moist/pink


Neck: Supple, 2+ carotid pulse no bruit, No LAD


Respiratory: Clear to auscultation bilaterally, Normal air movement


Cardiovascular: Regular rate/rhythm, Normal S1 S2


Gastrointestinal: Normal bowel sounds, No tenderness


Musculoskeletal: No tenderness


Integumentary: No rashes


Neurological: Normal speech, Normal strength at 5/5 x4 extr, Normal tone, Normal

 affect





- Studies


Laboratory Data (last 24 hrs)





06/09/21 17:24: WBC 13.90 H, Hgb 11.9 L, Hct 35.5 L, Plt Count 507 H


06/09/21 17:24: Sodium 129 L, Potassium 4.2, BUN 50 H, Creatinine 2.93 H, 

Glucose 578 H*, Total Bilirubin 0.3, AST 5 L, ALT 11 L, Alkaline Phosphatase 170

 H, Lipase 154








Assessment and Plan





- Plan


Assessment


Acute renal failure secondary to chronic urinary retention S/P bilateral 

ureteral stenting complicated with urinary tract infection, leukocytosis with 

history of ESBL


Diabetes mellitus type 2 with hyperglycemia


Hypertension


Plan


Acute renal failure secondary to chronic urinary retention S/P bilateral 

ureteral stenting complicated with urinary tract infection, leukocytosis with 

history of ESBL:  Case was discussed with neurology, urology recommended removal

 of ureteral stents, this was supposed to be performed in the office but patient

 refused.  Patient currently refusing to have stents removed unless he is under 

sedation.  Urology recommends placement Newton catheter for treatment of urinary 

retention.  Nephrology consulted, continue with IV fluids, Newton catheter, 

patient's postvoid residual was approximately 400 cc.  Patient with previous 

urine culture sensitive to meropenem, continue with meropenem therapy.  

Discussed with patient at length regarding need for further evaluation from 

Urology on an outpatient basis, patient plans on seeing another urologist once 

he is discharged although he is having trouble arranging to do this as he does 

not currently have any insurance coverage.   consulted to assist 

patient in obtaining additional coverage if possible.  Patient very difficult, 

noncompliant with therapy so far, described to the patient at length that if he 

does not have his urinary retention addressed you will continue to have 

infections and problems with his kidneys leading up to the possible need for 

dialysis in even severe infection/death.  Patient does verbalize understanding 

this.


Diabetes mellitus type 2 with hyperglycemia:  Initial blood sugar close to 600. 

Patient reports not taking his insulin today, this was addressed in detail as 

well.  Will continue aggressive sliding scale insulin therapy.  Previous A1c 

greater than 14. ADA diet.


Hypertension:  Continue home medications as appropriate.


Discharge Plan: Home


Plan to discharge in: Greater than 2 days





- Advance Directives


Does patient have a Living Will: No


Does patient have a Durable POA for Healthcare: No





- Code Status/Comfort Care


Code Status Assessed: Yes (Full code)


Critical Care: No


Time Spent Managing Pts Care (In Minutes): 55

## 2021-06-09 NOTE — ER
Nurse's Notes                                                                                     

 [facility]                                                                 

Name: Julio Cesar Reeves                                                                                

Age: 40 yrs                                                                                       

Sex: Male                                                                                         

: 1980                                                                                   

MRN: [id number]                                                                                   

Arrival Date: 2021                                                                          

Time: 15:48                                                                                       

Account#: [id number]                                                                            

Bed 19                                                                                            

Private MD:                                                                                       

Diagnosis: Acute Kidney Injury;UTI                                                                

                                                                                                  

Presentation:                                                                                     

                                                                                             

15:55 Chief complaint: Patient states: Discharged from here about 3 weeks ago from having a   jl7 

      severe UTI, went to the urologist, Dr. Gallardo, 2 weeks ago and he removed the              

      catheter, I couldn't urinate correctly and he said he gives me about 2 weeks before I'm     

      back in the same situation. All the symptoms are back and severe. I have stents that        

      were placed and need to be removed. Coronavirus screen: Client denies travel out of the     

      [place] in the last 14 days. At this time, the client does not indicate any symptoms           

      associated with coronavirus-19. Ebola Screen: No symptoms or risks identified at this       

      time. Initial Sepsis Screen: Does the patient meet any 2 criteria? No. Patient's            

      initial sepsis screen is negative. Does the patient have a suspected source of              

      infection? No. Patient's initial sepsis screen is negative. Risk Assessment: Do you         

      want to hurt yourself or someone else? Patient reports no desire to harm self or            

      others. Onset of symptoms was May 2021. Care prior to arrival: None.                        

15:55 Method Of Arrival: Wheelchair                                                           jl7 

15:55 Acuity: KAREEM 3                                                                           jl7 

                                                                                                  

Triage Assessment:                                                                                

16:02 General: Appears in no apparent distress. uncomfortable, Behavior is calm, cooperative, jl7 

      appropriate for age. Pain: Denies pain.                                                     

                                                                                                  

Historical:                                                                                       

- Allergies:                                                                                      

16:02 No Known Allergies;                                                                     jl7 

- PMHx:                                                                                           

16:02 Diabetes - NIDDM; Hypertension; kidney infection;                                       jl7 

                                                                                                  

- Immunization history:: Adult Immunizations up to date, Client reports having NOT                

  received the Covid vaccine.                                                                     

- Social history:: Smoking status: Patient reports the use of cigarette tobacco                   

  products, smokes one pack cigarettes per day.                                                   

                                                                                                  

                                                                                                  

Screenin:11 Abuse screen: Denies threats or abuse. Nutritional screening: No deficits noted.        jd3 

      Tuberculosis screening: No symptoms or risk factors identified. Fall Risk Ambulatory        

      Aid- None/Bed Rest/Nurse Assist (0 pts). Gait- Normal/Bed Rest/Wheelchair (0 pts)           

      Mental Status- Oriented to own ability (0 pts). Total Johnson Fall Scale indicates No         

      Risk (0-24 pts).                                                                            

                                                                                                  

Assessment:                                                                                       

16:19 General: Appears in no apparent distress. comfortable, Behavior is calm, cooperative,   jd3 

      appropriate for age. Pain: Complains of pain in suprapubic area Pain radiates to low        

      back area, left flank and right flank Quality of pain is described as sharp. Neuro:         

      Level of Consciousness is awake, alert, obeys commands, Oriented to person, place,          

      time, situation. Cardiovascular: Denies chest pain, Capillary refill < 3 seconds            

      Patient's skin is warm and dry. Respiratory: Airway is patent Respiratory effort is         

      even, unlabored, Respiratory pattern is regular, symmetrical, Denies cough, shortness       

      of breath. GI: No signs and/or symptoms were reported involving the gastrointestinal        

      system. : Reports burning with urination, urgency, urinary frequency, Denies blood in     

      the urine. EENT: No signs and/or symptoms were reported regarding the EENT system.          

      Derm: Skin is intact, Skin is dry, Skin is normal, Skin temperature is warm.                

      Musculoskeletal: Circulation, motion, and sensation intact. Range of motion: intact in      

      all extremities.                                                                            

17:35 Reassessment: Patient appears in no apparent distress at this time. No changes from     jd3 

      previously documented assessment. Patient and/or family updated on plan of care and         

      expected duration. Pain level reassessed. Patient is alert, oriented x 3, equal             

      unlabored respirations, skin warm/dry/pink.                                                 

18:30 Reassessment: Patient appears in no apparent distress at this time. No changes from     jd3 

      previously documented assessment. Patient and/or family updated on plan of care and         

      expected duration. Pain level reassessed. Patient is alert, oriented x 3, equal             

      unlabored respirations, skin warm/dry/pink.                                                 

19:30 Reassessment: Patient and/or family updated on plan of care and expected duration. Pain lc1 

      level reassessed. Pain: Complains of pain in abdomen and suprapubic area Pain currently     

      is 10 out of 10 on a pain scale. Quality of pain is described as sharp, Is continuous,      

      Noted to be resistant to movement, Curtis REDDY notified.                                        

20:30 Reassessment: No changes from previously documented assessment. Patient and/or family   lc1 

      updated on plan of care and expected duration. Pain level reassessed. Patient is alert,     

      oriented x 3, equal unlabored respirations, skin warm/dry/pink.                             

                                                                                                  

Vital Signs:                                                                                      

15:55  / 107; Pulse 105; Resp 17 S; Temp 97.5(TE); Pulse Ox 100% on R/A; Weight 108.86  jl7 

      kg; Height 5 ft. 4 in. (162.56 cm); Pain 10/10;                                             

17:35  / 80; Pulse 101; Resp 17 S; Pulse Ox 97% on R/A;                                 jd3 

19:40  / 68; Pulse 112; Resp 20; Temp 97.4; Pulse Ox 99% on R/A;                        lc1 

22:03  / 86; Pulse 111; Resp 18; Pulse Ox 99% on R/A;                                   lc1 

23:06  / 89; Pulse 101; Resp 18; Pulse Ox 99% on R/A;                                   lc1 

15:55 Body Mass Index 41.20 (108.86 kg, 162.56 cm)                                            jl7 

                                                                                                  

ED Course:                                                                                        

15:48 Patient arrived in ED.                                                                  mr  

16:01 Triage completed.                                                                       jl7 

16:02 Arm band placed on right wrist.                                                         jl7 

16:05 Reynold Tan, RN is Primary Nurse.                                                  jd3 

16:11 Patient has correct armband on for positive identification. Placed in gown. Bed in low  jd3 

      position. Call light in reach. Side rails up X 1. Adult w/ patient. Pulse ox on. NIBP       

      on.                                                                                         

16:14 Curtis Fallon PA is PHCP.                                                              [facility] 

16:14 Tima Little MD is Attending Physician.                                                [facility] 

17:32 Urine Culture Sent.                                                                     jd3 

17:32 Inserted saline lock: 22 gauge in left antecubital area, using aseptic technique. Blood jd3 

      collected.                                                                                  

17:45 Newton cath inserted, using sterile technique, 16 Fr., by ED staff, balloon inflated, to jd3 

      gravity drainage.                                                                           

19:06 CT Stone Protocol In Process Unspecified.                                               EDMS

19:57 Sreekanth Little MD is Hospitalizing Provider.                                           [facility] 

23:08 Awaiting bed assignment.                                                                [facility] 

23:08 No provider procedures requiring assistance completed. Patient admitted, IV remains in  lc1 

      place.                                                                                      

                                                                                                  

Administered Medications:                                                                         

17:32 Drug: morphine 4 mg Route: IVP; Site: left antecubital;                                 jd3 

20:22 Follow up: Response: No adverse reaction                                                lc1 

17:32 Drug: Zofran (Ondansetron) 4 mg Route: IVP; Site: left antecubital;                     jd3 

20:22 Follow up: Response: No adverse reaction                                                lc1 

19:20 Drug: NS 0.9% 1000 ml Route: IV; Rate: 1000 ml; Site: left antecubital;                 lc1 

23:14 Follow up: IV Status: Completed infusion                                                lc1 

19:20 Drug: Insulin Regular Human 10 units {Co-Signature: nataliya (Mahendra Cavanaugh RN).} Route:    lc1 

      Sub-Q; Site: left upper abdomen;                                                            

19:29 Follow up: Response: No adverse reaction                                                lc1 

19:20 Drug: Meropenem 1 grams Route: IV; Rate: calculated rate; Site: left antecubital;       lc1 

23:14 Follow up: IV Status: Completed infusion                                                lc1 

19:20 Drug: NS 0.9% 1000 ml Route: IV; Rate: 125 ml/hr; Site: left antecubital;               lc1 

23:14 Follow up: IV Status: Infusion continued                                                lc1 

19:22 Drug: Insulin Regular Human 10 units {Co-Signature: nataliya (Mahendra Cavanaugh RN).} Route:    lc1 

      IVP; Site: left antecubital;                                                                

19:29 Follow up: Response: No adverse reaction                                                lc1 

19:40 Drug: morphine 4 mg Route: IVP; Site: left antecubital;                                 lc1 

20:22 Follow up: Response: No adverse reaction                                                lc1 

                                                                                                  

                                                                                                  

Outcome:                                                                                          

19:58 Decision to Hospitalize by Provider.                                                    juan 

23:06 Condition: stable                                                                       1 

23:06 Instructed on the need for admit.                                                           

23:39 Admitted to Med/surg accompanied by nurse, via wheelchair, room 219, with chart, Report lc1 

      called to  walter                                                                            

06/10                                                                                             

00:13 Patient left the ED.                                                                    1 

                                                                                                  

Signatures:                                                                                       

Dispatcher [employer]                           EDMS                                                 

Curtis Fallon PA PA jmm                                                  

HorneCharlette                                 mr Martel, Jennifer key1                                                  

Mable Berger RN                        RN   jl7                                                  

Reynold Tan RN RN jd3 Joseph Malcaba RN jm8                                                  

                                                                                                  

Corrections: (The following items were deleted from the chart)                                    

                                                                                             

16:06 15:55 Chief complaint: Patient states: Discharged from her about 3 weeks ago from       [place] 

      having a severe UTI, went to the urologist, Dr. Gallardo, 2 weeks ago and he removed the     

      catheter, I couldn't urinate correctly and he said he gives me about 2 weeks before I'm     

      back in the same situation. All the symptoms are back and severe. I have stents that        

      were placed and need to be removed jl7                                                      

                                                                                                  

**************************************************************************************************

## 2021-06-10 LAB
ALBUMIN SERPL BCP-MCNC: 3.1 G/DL (ref 3.4–5)
ALP SERPL-CCNC: 150 U/L (ref 45–117)
ALT SERPL W P-5'-P-CCNC: 11 U/L (ref 12–78)
AST SERPL W P-5'-P-CCNC: 6 U/L (ref 15–37)
BUN BLD-MCNC: 46 MG/DL (ref 7–18)
CREAT UR-SCNC: 74 MG/DL (ref 20–370)
GLUCOSE SERPLBLD-MCNC: 354 MG/DL (ref 74–106)
HCT VFR BLD CALC: 37.3 % (ref 39.6–49)
LYMPHOCYTES # SPEC AUTO: 3.4 K/UL (ref 0.7–4.9)
MAGNESIUM SERPL-MCNC: 2.4 MG/DL (ref 1.8–2.4)
PMV BLD: 8.4 FL (ref 7.6–11.3)
POTASSIUM SERPL-SCNC: 3.8 MMOL/L (ref 3.5–5.1)
POTASSIUM UR-SCNC: 21 MMOL/L (ref 20–40)
PROT UR-MCNC: 719 MG/DL (ref ?–11.9)
RBC # BLD: 4.27 M/UL (ref 4.33–5.43)
SODIUM UR-SCNC: 37 MMOL/L (ref 27–287)
UA DIPSTICK W REFLEX MICRO PNL UR: (no result)
URINE UROTHELIAL CELLS: (no result) /HPF

## 2021-06-10 RX ADMIN — HYDROMORPHONE HYDROCHLORIDE PRN MG: 1 INJECTION, SOLUTION INTRAMUSCULAR; INTRAVENOUS; SUBCUTANEOUS at 20:22

## 2021-06-10 RX ADMIN — MORPHINE SULFATE PRN MG: 2 INJECTION, SOLUTION INTRAMUSCULAR; INTRAVENOUS at 05:16

## 2021-06-10 RX ADMIN — Medication SCH ML: at 00:01

## 2021-06-10 RX ADMIN — MORPHINE SULFATE PRN MG: 2 INJECTION, SOLUTION INTRAMUSCULAR; INTRAVENOUS at 01:54

## 2021-06-10 RX ADMIN — Medication SCH: at 08:33

## 2021-06-10 RX ADMIN — HUMAN INSULIN SCH UNIT: 100 INJECTION, SOLUTION SUBCUTANEOUS at 20:31

## 2021-06-10 RX ADMIN — HUMAN INSULIN SCH UNIT: 100 INJECTION, SUSPENSION SUBCUTANEOUS at 08:31

## 2021-06-10 RX ADMIN — HUMAN INSULIN SCH UNIT: 100 INJECTION, SOLUTION SUBCUTANEOUS at 17:32

## 2021-06-10 RX ADMIN — HYDROCODONE BITARTRATE AND ACETAMINOPHEN PRN TAB: 7.5; 325 TABLET ORAL at 03:00

## 2021-06-10 RX ADMIN — NICOTINE SCH MG: 14 PATCH TRANSDERMAL at 08:30

## 2021-06-10 RX ADMIN — METOPROLOL TARTRATE SCH MG: 25 TABLET ORAL at 20:27

## 2021-06-10 RX ADMIN — Medication SCH ML: at 20:32

## 2021-06-10 RX ADMIN — MORPHINE SULFATE PRN MG: 2 INJECTION, SOLUTION INTRAMUSCULAR; INTRAVENOUS at 09:23

## 2021-06-10 RX ADMIN — HYDROMORPHONE HYDROCHLORIDE PRN MG: 1 INJECTION, SOLUTION INTRAMUSCULAR; INTRAVENOUS; SUBCUTANEOUS at 12:07

## 2021-06-10 RX ADMIN — MEROPENEM SCH MLS: 1 INJECTION INTRAVENOUS at 20:25

## 2021-06-10 RX ADMIN — HUMAN INSULIN SCH UNIT: 100 INJECTION, SOLUTION SUBCUTANEOUS at 08:31

## 2021-06-10 RX ADMIN — ENOXAPARIN SODIUM SCH: 30 INJECTION SUBCUTANEOUS at 09:00

## 2021-06-10 RX ADMIN — TAMSULOSIN HYDROCHLORIDE SCH MG: 0.4 CAPSULE ORAL at 20:26

## 2021-06-10 RX ADMIN — MEROPENEM SCH MLS: 1 INJECTION INTRAVENOUS at 09:23

## 2021-06-10 RX ADMIN — SODIUM CHLORIDE SCH MLS: 0.9 INJECTION, SOLUTION INTRAVENOUS at 05:19

## 2021-06-10 RX ADMIN — HUMAN INSULIN SCH UNIT: 100 INJECTION, SOLUTION SUBCUTANEOUS at 12:08

## 2021-06-10 NOTE — P.CNS
Date of Consult: 06/10/21


Reason for Consult: EROS/ CKD


Requesting Physician: Sreekanth Little


Primary Care Provider: Dr. Jack


Chief Complaint: Acute renal failure, UTI


History of Present Illness: 





40-year-old  male with history of diabetes mellitus type 2, hypertension

presents emergency department for urinary retention, back pain, suprapubic pain.

 Patient with recent admission for urinary retention, hydronephrosis, acute re

nal failure, UTI with ESBL, patient was treated with IV meropenem, ureteral 

stents were placed bilaterally, patient had Newton catheter throughout admission 

was discharged with catheter.  Patient followed up with neurology on outpatient 

basis but did not agree with plan of care/was not compliant with additional 

evaluation, patient was due to have his ureteral stents removed and urology 

offer to remove them last week for no cost but patient refused as he has not 

offered general sedation.  Patient was evaluated in the emergency department, 

labs significant for white blood cell count 13.9 hemoglobin 11.9 sodium 129 

creatinine 2.93 GFR 24 BUN 50 glucose 578 pro calcitonin 15.48.  UA significant 

for 3+ leukocytes.  Case was discussed with urology, as patient is noncompliant 

with plan of care he did not have much to offer aside from offering to remove 

patient's ureteral stents which he suggested did need to be removed at some 

point.  ED provider wishes to admit for further evaluation and management of 

acute renal failure, UTI.





16:25 This 40 yrs old  Male presents to ER via Wheelchair with 

complaints of Urinary  jmm 


      Problem, Hand Swelling, Feet Swelling.                                    

                 


16:25 The patient presents with urinary symptoms, dysuria. Onset: The 

symptoms/episode        jmm 


      began/occurred gradually, 2 week(s) ago. Modifying factors: The symptoms 

are alleviated     


      by nothing, the symptoms are aggravated by nothing. Associated signs and 

symptoms:          


      Pertinent positives: dysuria. This is a 40 year old male with a history of

DM, HTN that     


      presents to the ED with complaints of right flank pain, dysuria, increased

pain over        


      the past 2 weeks. Denies fever. Similar symptoms when diagnosed with uti 

and kidney         


      obstruction. Stent placed in the kidneys


Allergies





No Known Allergies Allergy (Verified 06/10/21 00:01)


   





Home medications list reviewed: Yes


Home Medications: 








Folic Acid 1 mg PO DAILY 06/10/21 


Insulin Aspart [Insulin Aspart Flexpen] See Protocol SQ ACHS 06/10/21 


Insulin Glargine,Hum.rec.anlog [Lantus] 50 unit SQ BEDTIME 06/10/21 


Losartan Potassium 50 mg PO BID 06/10/21 


Metoprolol Tartrate 25 mg PO BID 06/10/21 


Nicotine [Nicoderm] 14 mg TD DAILY 06/10/21 


Tamsulosin [Flomax] 0.4 mg PO BEDTIME 06/10/21 


Tramadol HCl [Ultram] 50 mg PO TID PRN 06/10/21 


hydroCHLOROthiazide [Hydrochlorothiazide] 25 mg PO DAILY 06/10/21 








- Past Medical/Surgical History


Diabetic: Yes


-: DM


-: HTN


-: Kidney Infection


-: Urinary retention


-: Ureteral stents bilaterally


Psychosocial/ Personal History: Unemployed, lives alone





- Family History


  ** Mother


Medical History: Diabetes





  ** Father


Medical History: Diabetes





- Social History


Smoking Status: Current every day smoker


Alcohol use: No


CD- Drugs: No


Caffeine use: Yes


Place of Residence: Home





Review of Systems


10-point ROS is otherwise unremarkable


General: Weakness, Malaise


Respiratory: SOB with Excertion


Cardiovascular: Edema


Gastrointestinal: Abdominal Pain





Physical Examination














Temp Pulse Resp BP Pulse Ox


 


 97 F   98 H  16   132/84   98 


 


 06/10/21 08:00  06/10/21 08:00  06/10/21 09:23  06/10/21 08:00  06/10/21 09:23








General: Oriented x3, Cooperative


HEENT: Atraumatic


Neck: Supple


Respiratory: Clear to auscultation bilaterally


Cardiovascular: Regular rate/rhythm, Edema


Gastrointestinal: Tenderness


Musculoskeletal: No clubbing, No contractures


Integumentary: No rashes, No cyanosis


Neurological: Normal speech


Laboratory Data (last 24 hrs)





06/09/21 17:24: WBC 13.90 H, Hgb 11.9 L, Hct 35.5 L, Plt Count 507 H


06/09/21 17:24: Sodium 129 L, Potassium 4.2, BUN 50 H, Creatinine 2.93 H, 

Glucose 578 H*, Total Bilirubin 0.3, AST 5 L, ALT 11 L, Alkaline Phosphatase 170

 H, Lipase 154





Imagings Data: 


EXAM DESCRIPTION:  CT - [facility] Protocol - 6/9/2021 7:06 pm


CLINICAL HISTORY:  Flank pain.


FLANK PAIN


COMPARISON:  [facility] Protocol dated 4/20/2021; Kidney Imag W/Flow F W dated 

4/26/2021; Chest Single View dated 4/28/2021; Cystography dated 4/26/2021


TECHNIQUE:  Axial images were obtained without oral or IV contrast. Lack of 

contrast limits solid organ and vascular assessment. The field-of-view spans the

 entirety of the  system partially obscuring uppermost abdomen and lung bases.

 Coronal reformatted images were obtained and reviewed.


All CT scans are performed using dose optimization technique as appropriate and 

may include automated exposure control or mA/KV adjustment according to patient 

size.


FINDINGS:  The lower lung fields are clear.


Imaged portions of the liver and spleen show no suspicious findings on non-

contrast imaging. The pancreas and adrenal glands are normal. No pathologic 

lymphadenopathy in the abdomen or pelvis.


Bilateral double-J stents are in place. The proximal and distal aspects of both 

stents are in expected positioning. Mild caliectasis of the upper pole moieties 

bilaterally. Newton catheter is present within the urinary bladder. Thickening of

 the bladder wall is also seen.


Mildly prominent lymph nodes are evident in the pelvis including the perirectal 

fat, measuring up to 11 mm. Several mildly enlarged lymph nodes are also seen 

along both iliac chain the periaortic region on the left measuring 21 mm.


No bowel obstruction, free air, free fluid or abscess. Normal appendix noted.


No significant bony abnormality. Fat containing bilateral inguinal hernias are 

seen, larger on the right.


IMPRESSION:  Bilateral double-J stents are in place. Stents are in expected 

positioning. Mild caliectasis involving the upper pole renal calices bilaterally

 seen. No significant hydronephrosis felt to be present.


Urinary bladder appears thickened with a Newton catheter in place.


Nonspecific lymphadenopathy is seen in the retroperitoneum and pelvis as 

described.


Conclusions/Impression: 








EROS likely due to obstruction


CKD III with proteinuria


-No NSAIDs





Hyponatremia


-Give Lasix





Hypokalemia


-Replete potassium prn





NAG Acidosis





HTN


-Continue Metoprolol





LE Edema


-Low sodium diet


-Give Lasix X1


-Discontinue IVF





DM II with CKD


-Continue Insulin 70/30


-RISS





Anemia in chronic illness


-Monitor H&H





Acute cystitis


-Continue Meropenem


-Follow up culture


-Follow up with urology for BL ureteral stents





Thank you kindly for the consultation.

## 2021-06-10 NOTE — P.PN
Subjective


Date of Service: 06/10/21


Primary Care Provider: Dr. Jack


Chief Complaint: Acute renal failure, UTI


Subjective: Improving (feels somewhat improved, pain persists in bladder and 

lower back, feels meds help but not lasting enough. Dilaudid helped better last 

hospitalization. some hematuria in de oliveira bag, urine in tubing clear)





Review of Systems


10-point ROS is otherwise unremarkable





Physical Examination





- Vital Signs


Temperature: 97 F


Blood Pressure: 132/84


Pulse: 98


Respirations: 16


Pulse Ox (%): 98





- Studies


Laboratory Data (last 24 hrs)





06/09/21 17:24: WBC 13.90 H, Hgb 11.9 L, Hct 35.5 L, Plt Count 507 H


06/09/21 17:24: Sodium 129 L, Potassium 4.2, BUN 50 H, Creatinine 2.93 H, 

Glucose 578 H*, Total Bilirubin 0.3, AST 5 L, ALT 11 L, Alkaline Phosphatase 170

H, Lipase 154








Assessment & Plan


Physician Review Additional Text: 


Physical Exam


General: Alert, In no apparent distress, Oriented x3


HEENT: Atraumatic, PERRLA, Mucous membr. moist/pink


Neck: Supple, 2+ carotid pulse no bruit, No LAD


Respiratory: Clear to auscultation bilaterally, Normal air movement


Cardiovascular: Regular rate/rhythm, Normal S1 S2


Gastrointestinal: Normal bowel sounds, No tenderness


Musculoskeletal: No tenderness


Integumentary: No rashes


Neurological: Normal speech, Normal strength at 5/5 x4 extr, Normal tone, Normal

affect








Problem List


Acute renal failure secondary to chronic urinary retention S/P bilateral 

ureteral stenting complicated with urinary tract infection, leukocytosis with 

history of ESBL


Diabetes mellitus type 2 with hyperglycemia


Hypertension





-EROS improving with IVF and de oliveira


-continue IV meropenem, h/o ESBL


-pt has been noncompliant with medications and recommendations, refused ureteral

stent removal, unless he would have full sedation


-Nephrology consulted for assistance with EROS


-significant hyperglycemia, improving.  Patient has been noncompliant and not 

using his insulin for the last several days


-need to continue to monitor closely, close management of his glucose as well


-f/u cultures


-discussed with urology, would possibly be able to remove stents at bedside with

some light sedation / local anesthestic if patient is agreeable and stable for 

procedure


-continue home medications, patient unsure of exact doses, will further confirm,

states he takes insulin 70/30 - 20u in AM, 40u at night





VTE: SCDs, hematuria


Code: full


Dispo: anticipate dc home in ~2 days, pending culture, may need IV antibiotics 

on discharge








Time Spent Managing Pts Care (In Minutes): 45

## 2021-06-11 LAB
ALBUMIN SERPL BCP-MCNC: 3.2 G/DL (ref 3.4–5)
ALP SERPL-CCNC: 153 U/L (ref 45–117)
ALT SERPL W P-5'-P-CCNC: 12 U/L (ref 12–78)
AST SERPL W P-5'-P-CCNC: 5 U/L (ref 15–37)
BUN BLD-MCNC: 43 MG/DL (ref 7–18)
GLUCOSE SERPLBLD-MCNC: 365 MG/DL (ref 74–106)
HCT VFR BLD CALC: 39.6 % (ref 39.6–49)
LYMPHOCYTES # SPEC AUTO: 3.3 K/UL (ref 0.7–4.9)
MAGNESIUM SERPL-MCNC: 2.4 MG/DL (ref 1.8–2.4)
NT-PROBNP SERPL-MCNC: 71 PG/ML (ref ?–125)
PMV BLD: 8.7 FL (ref 7.6–11.3)
POTASSIUM SERPL-SCNC: 3.7 MMOL/L (ref 3.5–5.1)
RBC # BLD: 4.51 M/UL (ref 4.33–5.43)
URATE SERPL-MCNC: 10.3 MG/DL (ref 3.5–7.2)

## 2021-06-11 RX ADMIN — MEROPENEM SCH MLS: 1 INJECTION INTRAVENOUS at 08:34

## 2021-06-11 RX ADMIN — MEROPENEM SCH MLS: 1 INJECTION INTRAVENOUS at 20:31

## 2021-06-11 RX ADMIN — HYDROMORPHONE HYDROCHLORIDE PRN MG: 1 INJECTION, SOLUTION INTRAMUSCULAR; INTRAVENOUS; SUBCUTANEOUS at 16:37

## 2021-06-11 RX ADMIN — NICOTINE SCH MG: 14 PATCH TRANSDERMAL at 08:34

## 2021-06-11 RX ADMIN — HYDROMORPHONE HYDROCHLORIDE PRN MG: 1 INJECTION, SOLUTION INTRAMUSCULAR; INTRAVENOUS; SUBCUTANEOUS at 00:25

## 2021-06-11 RX ADMIN — HUMAN INSULIN SCH UNIT: 100 INJECTION, SUSPENSION SUBCUTANEOUS at 08:36

## 2021-06-11 RX ADMIN — METOPROLOL TARTRATE SCH MG: 25 TABLET ORAL at 20:30

## 2021-06-11 RX ADMIN — TAMSULOSIN HYDROCHLORIDE SCH MG: 0.4 CAPSULE ORAL at 20:30

## 2021-06-11 RX ADMIN — OXYBUTYNIN CHLORIDE SCH MG: 5 TABLET ORAL at 20:29

## 2021-06-11 RX ADMIN — OXYBUTYNIN CHLORIDE SCH MG: 5 TABLET ORAL at 10:37

## 2021-06-11 RX ADMIN — HYDROMORPHONE HYDROCHLORIDE PRN MG: 1 INJECTION, SOLUTION INTRAMUSCULAR; INTRAVENOUS; SUBCUTANEOUS at 04:02

## 2021-06-11 RX ADMIN — Medication SCH: at 20:32

## 2021-06-11 RX ADMIN — HUMAN INSULIN SCH UNIT: 100 INJECTION, SOLUTION SUBCUTANEOUS at 08:35

## 2021-06-11 RX ADMIN — SODIUM CHLORIDE SCH MLS: 0.9 INJECTION, SOLUTION INTRAVENOUS at 16:25

## 2021-06-11 RX ADMIN — Medication SCH ML: at 08:36

## 2021-06-11 RX ADMIN — HUMAN INSULIN SCH UNITS: 100 INJECTION, SUSPENSION SUBCUTANEOUS at 20:31

## 2021-06-11 RX ADMIN — HUMAN INSULIN SCH UNIT: 100 INJECTION, SOLUTION SUBCUTANEOUS at 20:30

## 2021-06-11 RX ADMIN — HUMAN INSULIN SCH UNIT: 100 INJECTION, SOLUTION SUBCUTANEOUS at 16:25

## 2021-06-11 RX ADMIN — HUMAN INSULIN SCH UNIT: 100 INJECTION, SOLUTION SUBCUTANEOUS at 11:52

## 2021-06-11 RX ADMIN — HYDROMORPHONE HYDROCHLORIDE PRN MG: 1 INJECTION, SOLUTION INTRAMUSCULAR; INTRAVENOUS; SUBCUTANEOUS at 20:27

## 2021-06-11 RX ADMIN — HYDROMORPHONE HYDROCHLORIDE PRN MG: 1 INJECTION, SOLUTION INTRAMUSCULAR; INTRAVENOUS; SUBCUTANEOUS at 07:34

## 2021-06-11 RX ADMIN — METOPROLOL TARTRATE SCH MG: 25 TABLET ORAL at 08:34

## 2021-06-11 RX ADMIN — SODIUM CHLORIDE SCH MLS: 9 INJECTION, SOLUTION INTRAVENOUS at 10:38

## 2021-06-11 RX ADMIN — HYDROMORPHONE HYDROCHLORIDE PRN MG: 1 INJECTION, SOLUTION INTRAMUSCULAR; INTRAVENOUS; SUBCUTANEOUS at 11:53

## 2021-06-11 NOTE — P.PN
Date of Service: 06/11/21


Vital Signs











Temp Pulse Resp BP Pulse Ox


 


 97.6 F   87   20   125/79   97 


 


 06/11/21 11:00  06/11/21 11:00  06/11/21 12:23  06/11/21 11:00  06/11/21 12:23





Medications





Acetaminophen (Acetaminophen 500 Mg Tab)  500 mg PO Q4HP PRN


   PRN Reason: TEMP > 100' F


Hydrocodone Bitart/Acetaminophen (Hydrocodone/Apap 7.5/325 Mg Tab)  1 tab PO 

Q6HP PRN


   PRN Reason: Pain scale 5-7 (Moderate)


   Last Admin: 06/10/21 03:00 Dose:  1 tab


   Documented by: 


Dextrose (D50w 25 Gm/50 Ml Syringe)  12.5 gm IV PRN PRN; Protocol


   PRN Reason: HYPOGLYCEMIA


Enoxaparin Sodium (Enoxaparin 30 Mg/0.3 Ml)  30 mg SQ DAILY [facility]


   Last Admin: 06/10/21 09:00 Dose:  Not Given


   Documented by: 


Glucagon (Glucagon 1 Mg/Vial)  1 mg IM 1X PRN; Protocol


   PRN Reason: HYPOGLYCEMIA


Hydromorphone HCl (Hydromorphone Hcl 1 Mg/Ml Inj)  1 mg IV Q4H PRN


   PRN Reason: Pain scale 5-7 (Moderate)


   Last Admin: 06/11/21 11:53 Dose:  1 mg


   Documented by: 


Meropenem (Merrem 1 Gm/100 Ml Ns Ivpb)  1 gm in 100 mls @ 200 mls/hr IV Q12HR 

[facility]


   Last Admin: 06/11/21 08:34 Dose:  100 mls


   Documented by: 


Vancomycin HCl 2 gm/ Sodium (Chloride)  500 mls @ 250 mls/hr IVPB Q36H [facility]; 

Protocol


   Last Admin: 06/11/21 10:38 Dose:  500 mls


   Documented by: 


Sodium Chloride (Ns 1000 Ml Ivbag)  1,000 mls @ 75 mls/hr IV .[id number] [facility]


Insulin Human Isoph/Insulin Regular (Insulin 70/30 100 Units/Ml)  20 unit SQ 

DAILY WITH BREAKFAST [facility]


   Last Admin: 06/11/21 08:36 Dose:  20 unit


   Documented by: 


Insulin Human Isoph/Insulin Regular (Insulin 70/30 100 Units/Ml)  20 unit SQ 

BEDTIME [facility]


   Last Admin: 06/10/21 21:21 Dose:  20 unit


   Documented by: 


Insulin Human Regular (Insulin -Regular Human 50 Unit/0.5 Ml Ml)  0 unit SQ ACHS

[facility]; Protocol


   Last Admin: 06/11/21 11:52 Dose:  12 unit


   Documented by: 


Metoprolol Tartrate (Metoprolol Tar 25 Mg Tab)  25 mg PO BID [facility]


   Last Admin: 06/11/21 08:34 Dose:  25 mg


   Documented by: 


Nicotine (Nicotine 14 Mg/Pat)  14 mg TD DAILY [facility]


   Last Admin: 06/11/21 08:34 Dose:  14 mg


   Documented by: 


Ondansetron HCl (Ondansetron 4 Mg/2 Ml Vial)  4 mg IV Q6HP PRN


   PRN Reason: NAUSEA / VOMITING


Oxybutynin Chloride (Oxybutynin Chloride 5 Mg Tab)  5 mg PO BID [facility]


   Last Admin: 06/11/21 10:37 Dose:  5 mg


   Documented by: 


Sodium Chloride (Flush Normal Saline 10 Ml)  10 ml IV BID [facility]


   Last Admin: 06/11/21 08:36 Dose:  10 ml


   Documented by: 


Tamsulosin HCl (Tamsulosin 0.4 Mg Sr Cap)  0.4 mg PO BEDTIME [facility]


   Last Admin: 06/10/21 20:26 Dose:  0.4 mg


   Documented by: 


Microbiology Results





06/09/21 17:12   Clean Catch Urine   [place] Count - Preliminary


                            BETWEEN 10,000 & 100,000 CFU/ML


06/09/21 17:12   Clean Catch Urine    - Preliminary


                            MIXED DANNA.


06/09/21 19:49   Blood  - Blood   Blood Culture Gram Stain - Preliminary


06/09/21 19:49   Blood  - Blood   Anaerobic Blood Culture - Preliminary


                            No growth in 24 hours.


06/09/21 20:01   Blood  - Blood   Aerobic Blood Culture - Preliminary


                            No growth in 24 hours.


06/09/21 20:01   Blood  - Blood   Anaerobic Blood Culture - Preliminary


                            No growth in 24 hours.





Assessment/ Plan: 


Nephrology





Feeling better today with the increased dilaudid.


Urine output more cloudy today.


Difficulty sleeping overnight due to the pain.





Vitals, medications, blood work and imaging reviewed in the chart.


General: Oriented x3, Cooperative


HEENT: Atraumatic


Neck: Supple


Respiratory: Clear to auscultation bilaterally


Cardiovascular: Regular rate/rhythm, Edema


Gastrointestinal: Tenderness


Musculoskeletal: No clubbing, No contractures


Integumentary: No rashes, No cyanosis


Neurological: Normal speech


Laboratory Data (last 24 hrs)





06/09/21 17:24: WBC 13.90 H, Hgb 11.9 L, Hct 35.5 L, Plt Count 507 H


06/09/21 17:24: Sodium 129 L, Potassium 4.2, BUN 50 H, Creatinine 2.93 H, 

Glucose 578 H*, Total Bilirubin 0.3, AST 5 L, ALT 11 L, Alkaline Phosphatase 170

H, Lipase 154





Imagings Data: 


EXAM DESCRIPTION:  CT - [facility] Protocol - 6/9/2021 7:06 pm


CLINICAL HISTORY:  Flank pain.


FLANK PAIN


COMPARISON:  [facility] Protocol dated 4/20/2021; Kidney Imag W/Flow F W dated 

4/26/2021; Chest Single View dated 4/28/2021; Cystography dated 4/26/2021


TECHNIQUE:  Axial images were obtained without oral or IV contrast. Lack of 

contrast limits solid organ and vascular assessment. The field-of-view spans the

entirety of the  system partially obscuring uppermost abdomen and lung bases. 

Coronal reformatted images were obtained and reviewed.


All CT scans are performed using dose optimization technique as appropriate and 

may include automated exposure control or mA/KV adjustment according to patient 

size.


FINDINGS:  The lower lung fields are clear.


Imaged portions of the liver and spleen show no suspicious findings on non-

contrast imaging. The pancreas and adrenal glands are normal. No pathologic 

lymphadenopathy in the abdomen or pelvis.


Bilateral double-J stents are in place. The proximal and distal aspects of both 

stents are in expected positioning. Mild caliectasis of the upper pole moieties 

bilaterally. Newton catheter is present within the urinary bladder. Thickening of

the bladder wall is also seen.


Mildly prominent lymph nodes are evident in the pelvis including the perirectal 

fat, measuring up to 11 mm. Several mildly enlarged lymph nodes are also seen 

along both iliac chain the periaortic region on the left measuring 21 mm.


No bowel obstruction, free air, free fluid or abscess. Normal appendix noted.


No significant bony abnormality. Fat containing bilateral inguinal hernias are 

seen, larger on the right.


IMPRESSION:  Bilateral double-J stents are in place. Stents are in expected 

positioning. Mild caliectasis involving the upper pole renal calices bilaterally

seen. No significant hydronephrosis felt to be present.


Urinary bladder appears thickened with a Newton catheter in place.


Nonspecific lymphadenopathy is seen in the retroperitoneum and pelvis as 

described.


Conclusions/Impression: 








EROS likely due to obstruction


CKD III with proteinuria


-No NSAIDs





Hyponatremia





Hypokalemia


-Replete potassium prn





NAG Acidosis





HTN


-Continue Metoprolol





LE Edema


-Low sodium diet





DM II with CKD


-Continue Insulin 70/30


-RISS





Anemia in chronic illness


-Monitor H&H





Acute cystitis


-Continue Meropenem


-Follow up culture


-Follow up with urology for BL ureteral stents


-Restart IVF to flush the bladder due to worsening sediment.


-Repeat UA and culture ordered.





Case reviewed with Dr. Little

## 2021-06-11 NOTE — P.PN
Subjective


Date of Service: 06/11/21


Primary Care Provider: Dr. Jack


Chief Complaint: Acute renal failure, UTI


Subjective: Other (continues with pain, feels it may be slightly worse, 

medication not really helping - lower back and suprapubic pain urine in catheter

more cloudy this morning)





Review of Systems


10-point ROS is otherwise unremarkable





Physical Examination





- Vital Signs


Temperature: 97.6 F


Blood Pressure: 125/79


Pulse: 87


Respirations: 20


Pulse Ox (%): 97





Assessment & Plan


Physician Review Additional Text: 


Physical Exam


General: Alert, appears uncomfortable, Oriented x3


HEENT: Atraumatic, PERRLA, Mucous membr. moist/pink


Respiratory: Clear to auscultation bilaterally, Normal air movement


Cardiovascular: Regular rate/rhythm, Normal S1 S2


Gastrointestinal: soft, mild TTP in suprapubic region


Musculoskeletal: No tenderness





Problem List


Acute renal failure secondary to chronic urinary retention S/P bilateral 

ureteral stenting complicated with urinary tract infection, leukocytosis with 

history of ESBL


Diabetes mellitus type 2 with hyperglycemia


Hypertension





-EROS improving with IVF and de oliveira


-continue IV meropenem, h/o ESBL


-1 blood culture growing GPC in clusters, vancomycin ordered, suspect 

contamination


-pt has been noncompliant with medications and recommendations, refused ureteral

stent removal, unless he would have full sedation


-Nephrology consulted for assistance with EROS


-significant hyperglycemia, improving.  Patient has been noncompliant and not 

using his insulin for the last several days, adjust as needed


-f/u cultures


-continue home medications


-increase dilaudid, add oxybutynin





VTE: SCDs, hematuria


Code: full


Dispo: anticipate dc home in ~2-3 days, pending culture, may need IV antibiotics

on discharge








Time Spent Managing Pts Care (In Minutes): 45

## 2021-06-12 LAB
ALBUMIN SERPL BCP-MCNC: 2.4 G/DL (ref 3.4–5)
ALP SERPL-CCNC: 108 U/L (ref 45–117)
ALT SERPL W P-5'-P-CCNC: 10 U/L (ref 12–78)
AST SERPL W P-5'-P-CCNC: 7 U/L (ref 15–37)
BUN BLD-MCNC: 40 MG/DL (ref 7–18)
GLUCOSE SERPLBLD-MCNC: 114 MG/DL (ref 74–106)
HCT VFR BLD CALC: 30.4 % (ref 39.6–49)
LYMPHOCYTES # SPEC AUTO: 3.2 K/UL (ref 0.7–4.9)
MAGNESIUM SERPL-MCNC: 2.2 MG/DL (ref 1.8–2.4)
PMV BLD: 8.3 FL (ref 7.6–11.3)
POTASSIUM SERPL-SCNC: 3.3 MMOL/L (ref 3.5–5.1)
POTASSIUM SERPL-SCNC: 3.5 MMOL/L (ref 3.5–5.1)
RBC # BLD: 3.46 M/UL (ref 4.33–5.43)

## 2021-06-12 RX ADMIN — OXYBUTYNIN CHLORIDE SCH MG: 5 TABLET ORAL at 08:58

## 2021-06-12 RX ADMIN — Medication SCH ML: at 09:00

## 2021-06-12 RX ADMIN — HYDROMORPHONE HYDROCHLORIDE PRN MG: 1 INJECTION, SOLUTION INTRAMUSCULAR; INTRAVENOUS; SUBCUTANEOUS at 05:09

## 2021-06-12 RX ADMIN — SODIUM CHLORIDE SCH MLS: 9 INJECTION, SOLUTION INTRAVENOUS at 21:39

## 2021-06-12 RX ADMIN — HUMAN INSULIN SCH UNIT: 100 INJECTION, SUSPENSION SUBCUTANEOUS at 08:58

## 2021-06-12 RX ADMIN — METOPROLOL TARTRATE SCH MG: 25 TABLET ORAL at 08:57

## 2021-06-12 RX ADMIN — Medication SCH ML: at 21:00

## 2021-06-12 RX ADMIN — HUMAN INSULIN SCH UNIT: 100 INJECTION, SOLUTION SUBCUTANEOUS at 08:59

## 2021-06-12 RX ADMIN — HUMAN INSULIN SCH: 100 INJECTION, SUSPENSION SUBCUTANEOUS at 08:00

## 2021-06-12 RX ADMIN — MEROPENEM SCH MLS: 1 INJECTION INTRAVENOUS at 08:58

## 2021-06-12 RX ADMIN — MEROPENEM SCH MLS: 1 INJECTION INTRAVENOUS at 21:37

## 2021-06-12 RX ADMIN — HUMAN INSULIN SCH: 100 INJECTION, SOLUTION SUBCUTANEOUS at 07:30

## 2021-06-12 RX ADMIN — HUMAN INSULIN SCH UNITS: 100 INJECTION, SUSPENSION SUBCUTANEOUS at 21:38

## 2021-06-12 RX ADMIN — HYDROMORPHONE HYDROCHLORIDE PRN MG: 1 INJECTION, SOLUTION INTRAMUSCULAR; INTRAVENOUS; SUBCUTANEOUS at 13:08

## 2021-06-12 RX ADMIN — TAMSULOSIN HYDROCHLORIDE SCH MG: 0.4 CAPSULE ORAL at 21:37

## 2021-06-12 RX ADMIN — NICOTINE SCH MG: 14 PATCH TRANSDERMAL at 08:58

## 2021-06-12 RX ADMIN — METOPROLOL TARTRATE SCH MG: 25 TABLET ORAL at 21:35

## 2021-06-12 RX ADMIN — HUMAN INSULIN SCH UNIT: 100 INJECTION, SOLUTION SUBCUTANEOUS at 17:00

## 2021-06-12 RX ADMIN — HUMAN INSULIN SCH UNIT: 100 INJECTION, SOLUTION SUBCUTANEOUS at 13:08

## 2021-06-12 RX ADMIN — HUMAN INSULIN SCH UNIT: 100 INJECTION, SOLUTION SUBCUTANEOUS at 21:38

## 2021-06-12 RX ADMIN — SODIUM CHLORIDE SCH MLS: 0.9 INJECTION, SOLUTION INTRAVENOUS at 05:10

## 2021-06-12 RX ADMIN — HYDROMORPHONE HYDROCHLORIDE PRN MG: 1 INJECTION, SOLUTION INTRAMUSCULAR; INTRAVENOUS; SUBCUTANEOUS at 21:30

## 2021-06-12 RX ADMIN — HYDROCODONE BITARTRATE AND ACETAMINOPHEN PRN TAB: 7.5; 325 TABLET ORAL at 09:11

## 2021-06-12 RX ADMIN — SODIUM CHLORIDE SCH MLS: 0.9 INJECTION, SOLUTION INTRAVENOUS at 16:23

## 2021-06-12 RX ADMIN — OXYBUTYNIN CHLORIDE SCH MG: 5 TABLET ORAL at 21:37

## 2021-06-12 RX ADMIN — HYDROMORPHONE HYDROCHLORIDE PRN MG: 1 INJECTION, SOLUTION INTRAMUSCULAR; INTRAVENOUS; SUBCUTANEOUS at 17:00

## 2021-06-12 RX ADMIN — HYDROMORPHONE HYDROCHLORIDE PRN MG: 1 INJECTION, SOLUTION INTRAMUSCULAR; INTRAVENOUS; SUBCUTANEOUS at 00:19

## 2021-06-12 NOTE — P.PN
Subjective


Date of Service: 06/12/21


Primary Care Provider: Dr. Jack


Chief Complaint: Acute renal failure, UTI


Subjective: Improving (Pain is slightly improved, he reports overall feeling 

much better, urine continues to be cloudy glucose better controlled)





Review of Systems


10-point ROS is otherwise unremarkable





Physical Examination





- Vital Signs


Temperature: 97.8 F


Blood Pressure: 113/62


Pulse: 84


Respirations: 18


Pulse Ox (%): 95





- Studies


Microbiology Data (last 24 hrs): 








06/09/21 17:12   Clean Catch Urine   [place] Count - Final


                            BETWEEN 10,000 & 100,000 CFU/ML


06/09/21 17:12   Clean Catch Urine    - Final


                            MIXED LEDY.








Assessment & Plan


Physician Review Additional Text: 


Physical Exam


General: Alert, NAD, Oriented x3


HEENT: Mucous membr. moist/pink, normal conjunctiva


Respiratory: Clear to auscultation bilaterally, Normal air movement


Cardiovascular: Regular rate/rhythm, Normal S1 S2


Abd: soft, mild TTP in suprapubic region, lower R flank discomfort


Integumentary: no rash/lesions





Problem List


Acute renal failure secondary to chronic urinary retention S/P bilateral 

ureteral stenting complicated with urinary tract infection


UTI, cysitis; h/o ESBL recently


Diabetes mellitus type 2 with hyperglycemia, insulin dependent


Hypertension





-continue IV meropenem, h/o ESBL


-1 blood culture growing GPC in clusters, vancomycin ordered, suspect 

contamination, awaiting final


-initial urine culture grew mixed ledy, repeat NGTD - done after received IV 

antibiotics. May need to treat with continued ESBL coverage for 2 weeks, and 

will need PICC


-pt has been noncompliant with medications and recommendations, refused ureteral

stent removal, unless he would have full sedation


-Nephrology consulted for assistance with EROS, improving


-continue de oliveira, patient should maintain de oliveira until f/u with urology in ~2 

weeks


-significant hyperglycemia, improving.  Patient has been noncompliant and not 

using his insulin for the last several days, adjust as needed


-continue home medications


-continue dilaudid and oxybutinin





VTE: SCDs, hematuria


Code: full


Dispo: anticipate dc home in ~2 days, suspect will need PICC and IV antibiotics 

to cover for possible ESBL





Time Spent Managing Pts Care (In Minutes): 35

## 2021-06-13 LAB
ALBUMIN SERPL BCP-MCNC: 2.3 G/DL (ref 3.4–5)
BUN BLD-MCNC: 26 MG/DL (ref 7–18)
GLUCOSE SERPLBLD-MCNC: 158 MG/DL (ref 74–106)
HCT VFR BLD CALC: 29.3 % (ref 39.6–49)
LYMPHOCYTES # SPEC AUTO: 3.4 K/UL (ref 0.7–4.9)
MAGNESIUM SERPL-MCNC: 2 MG/DL (ref 1.8–2.4)
PMV BLD: 8.8 FL (ref 7.6–11.3)
POTASSIUM SERPL-SCNC: 3.7 MMOL/L (ref 3.5–5.1)
RBC # BLD: 3.34 M/UL (ref 4.33–5.43)

## 2021-06-13 RX ADMIN — MEROPENEM SCH MLS: 1 INJECTION INTRAVENOUS at 21:32

## 2021-06-13 RX ADMIN — Medication SCH ML: at 21:00

## 2021-06-13 RX ADMIN — ENOXAPARIN SODIUM SCH MG: 30 INJECTION SUBCUTANEOUS at 09:54

## 2021-06-13 RX ADMIN — TAMSULOSIN HYDROCHLORIDE SCH MG: 0.4 CAPSULE ORAL at 20:51

## 2021-06-13 RX ADMIN — HYDROMORPHONE HYDROCHLORIDE PRN MG: 1 INJECTION, SOLUTION INTRAMUSCULAR; INTRAVENOUS; SUBCUTANEOUS at 07:53

## 2021-06-13 RX ADMIN — LEVOFLOXACIN SCH MLS: 5 INJECTION, SOLUTION INTRAVENOUS at 11:00

## 2021-06-13 RX ADMIN — MEROPENEM SCH MLS: 1 INJECTION INTRAVENOUS at 09:55

## 2021-06-13 RX ADMIN — HYDROMORPHONE HYDROCHLORIDE PRN MG: 1 INJECTION, SOLUTION INTRAMUSCULAR; INTRAVENOUS; SUBCUTANEOUS at 22:24

## 2021-06-13 RX ADMIN — HYDROMORPHONE HYDROCHLORIDE PRN MG: 1 INJECTION, SOLUTION INTRAMUSCULAR; INTRAVENOUS; SUBCUTANEOUS at 01:46

## 2021-06-13 RX ADMIN — HUMAN INSULIN SCH UNIT: 100 INJECTION, SOLUTION SUBCUTANEOUS at 18:04

## 2021-06-13 RX ADMIN — METOPROLOL TARTRATE SCH MG: 25 TABLET ORAL at 09:55

## 2021-06-13 RX ADMIN — Medication SCH: at 09:00

## 2021-06-13 RX ADMIN — OXYBUTYNIN CHLORIDE SCH MG: 5 TABLET ORAL at 20:52

## 2021-06-13 RX ADMIN — HUMAN INSULIN SCH UNIT: 100 INJECTION, SUSPENSION SUBCUTANEOUS at 09:57

## 2021-06-13 RX ADMIN — SODIUM CHLORIDE SCH MLS: 0.9 INJECTION, SOLUTION INTRAVENOUS at 09:55

## 2021-06-13 RX ADMIN — HUMAN INSULIN SCH UNITS: 100 INJECTION, SUSPENSION SUBCUTANEOUS at 21:33

## 2021-06-13 RX ADMIN — HUMAN INSULIN SCH UNIT: 100 INJECTION, SOLUTION SUBCUTANEOUS at 09:56

## 2021-06-13 RX ADMIN — METOPROLOL TARTRATE SCH MG: 25 TABLET ORAL at 20:57

## 2021-06-13 RX ADMIN — HUMAN INSULIN SCH UNIT: 100 INJECTION, SOLUTION SUBCUTANEOUS at 12:54

## 2021-06-13 RX ADMIN — OXYBUTYNIN CHLORIDE SCH MG: 5 TABLET ORAL at 09:55

## 2021-06-13 RX ADMIN — HUMAN INSULIN SCH UNIT: 100 INJECTION, SOLUTION SUBCUTANEOUS at 21:33

## 2021-06-13 RX ADMIN — HYDROCODONE BITARTRATE AND ACETAMINOPHEN PRN TAB: 7.5; 325 TABLET ORAL at 20:52

## 2021-06-13 RX ADMIN — NICOTINE SCH MG: 14 PATCH TRANSDERMAL at 09:00

## 2021-06-13 RX ADMIN — HYDROCODONE BITARTRATE AND ACETAMINOPHEN PRN TAB: 7.5; 325 TABLET ORAL at 12:54

## 2021-06-13 NOTE — P.PN
Subjective


Date of Service: 06/13/21


Primary Care Provider: Dr. Jack


Chief Complaint: Acute renal failure, UTI


Subjective: Improving (slight improvement in pain, no longer with lower back 

pain, now mostly bladder discomfort, 10/10 at times. norco doesn't  help, only 

dilaudid. urine continues to be cloudy)





Review of Systems


10-point ROS is otherwise unremarkable





Physical Examination





- Vital Signs


Temperature: 97.8 F


Blood Pressure: 115/59


Pulse: 81


Respirations: 17


Pulse Ox (%): 95





- Studies


Microbiology Data (last 24 hrs): 








06/09/21 19:49   Blood  - Blood   Blood Culture Gram Stain - Final


06/09/21 17:12   Clean Catch Urine   [place] Count - Final


                            BETWEEN 10,000 & 100,000 CFU/ML


06/09/21 17:12   Clean Catch Urine    - Final


                            MIXED LEDY.








Assessment & Plan


Physician Review Additional Text: 


Physical Exam


General: Alert, appears slightly uncomfortable, Oriented x3


HEENT: Mucous membr. moist/pink, normal conjunctiva


Respiratory: Clear to auscultation bilaterally, Normal air movement


Cardiovascular: Regular rate/rhythm, Normal S1 S2, trace b/l pedal edema


Abd: soft, mild TTP in suprapubic region


Integumentary: no rash/lesions





Problem List


Acute renal failure secondary to chronic urinary retention S/P bilateral 

ureteral stenting complicated with urinary tract infection


UTI, cysitis; h/o ESBL recently


Diabetes mellitus type 2 with hyperglycemia, insulin dependent


Hypertension





-continue IV meropenem, h/o recent ESBL


-1 blood culture: staph hominis, possible cause of UTI vs contaminant, was 

empirically treated with IV vanc, will switch to levaquin


-initial urine culture grew mixed ledy, repeat NGTD - done after received IV 

antibiotics. May need to treat with continued ESBL coverage for 2 weeks, and 

will need PICC


-Infectious disease consult for tomorrow


-pt has been noncompliant with medications and recommendations, refused ureteral

stent removal, unless he would have full sedation; seems more receptive now


-Nephrology consulted for assistance with EROS, improving


-continue de oliveira, patient should maintain de oliveira until f/u with urology in ~2 

weeks


-significant hyperglycemia, improving.  Patient has been noncompliant and not 

using his insulin for the last several days, adjust as needed


-continue home medications


-continue dilaudid and oxybutinin





VTE: SCDs, hematuria


Code: full


Dispo: anticipate dc home in ~2 days, suspect will need PICC and IV antibiotics 

to cover for possible ESBL





Time Spent Managing Pts Care (In Minutes): 35

## 2021-06-14 LAB
ALBUMIN SERPL BCP-MCNC: 2.2 G/DL (ref 3.4–5)
BUN BLD-MCNC: 19 MG/DL (ref 7–18)
GLUCOSE SERPLBLD-MCNC: 99 MG/DL (ref 74–106)
HCT VFR BLD CALC: 27.7 % (ref 39.6–49)
LYMPHOCYTES # SPEC AUTO: 3.1 K/UL (ref 0.7–4.9)
MAGNESIUM SERPL-MCNC: 1.8 MG/DL (ref 1.8–2.4)
PMV BLD: 8.4 FL (ref 7.6–11.3)
POTASSIUM SERPL-SCNC: 3.6 MMOL/L (ref 3.5–5.1)
RBC # BLD: 3.18 M/UL (ref 4.33–5.43)

## 2021-06-14 PROCEDURE — 02HV33Z INSERTION OF INFUSION DEVICE INTO SUPERIOR VENA CAVA, PERCUTANEOUS APPROACH: ICD-10-PCS

## 2021-06-14 RX ADMIN — OXYBUTYNIN CHLORIDE SCH MG: 5 TABLET ORAL at 14:32

## 2021-06-14 RX ADMIN — LEVOFLOXACIN SCH MLS: 5 INJECTION, SOLUTION INTRAVENOUS at 10:59

## 2021-06-14 RX ADMIN — METOPROLOL TARTRATE SCH MG: 25 TABLET ORAL at 09:32

## 2021-06-14 RX ADMIN — HYDROCODONE BITARTRATE AND ACETAMINOPHEN PRN TAB: 7.5; 325 TABLET ORAL at 14:32

## 2021-06-14 RX ADMIN — Medication SCH: at 21:00

## 2021-06-14 RX ADMIN — MEROPENEM SCH MLS: 1 INJECTION INTRAVENOUS at 21:00

## 2021-06-14 RX ADMIN — HUMAN INSULIN SCH: 100 INJECTION, SOLUTION SUBCUTANEOUS at 07:30

## 2021-06-14 RX ADMIN — METOPROLOL TARTRATE SCH MG: 25 TABLET ORAL at 21:19

## 2021-06-14 RX ADMIN — HYDROCODONE BITARTRATE AND ACETAMINOPHEN PRN TAB: 7.5; 325 TABLET ORAL at 21:20

## 2021-06-14 RX ADMIN — HYDROMORPHONE HYDROCHLORIDE PRN MG: 1 INJECTION, SOLUTION INTRAMUSCULAR; INTRAVENOUS; SUBCUTANEOUS at 09:36

## 2021-06-14 RX ADMIN — Medication SCH ML: at 09:00

## 2021-06-14 RX ADMIN — HYDROMORPHONE HYDROCHLORIDE PRN MG: 1 INJECTION, SOLUTION INTRAMUSCULAR; INTRAVENOUS; SUBCUTANEOUS at 03:21

## 2021-06-14 RX ADMIN — HUMAN INSULIN SCH: 100 INJECTION, SOLUTION SUBCUTANEOUS at 16:30

## 2021-06-14 RX ADMIN — TAMSULOSIN HYDROCHLORIDE SCH MG: 0.4 CAPSULE ORAL at 21:19

## 2021-06-14 RX ADMIN — OXYBUTYNIN CHLORIDE SCH MG: 5 TABLET ORAL at 21:19

## 2021-06-14 RX ADMIN — MEROPENEM SCH MLS: 1 INJECTION INTRAVENOUS at 10:57

## 2021-06-14 RX ADMIN — HUMAN INSULIN SCH UNIT: 100 INJECTION, SUSPENSION SUBCUTANEOUS at 09:29

## 2021-06-14 RX ADMIN — HUMAN INSULIN SCH: 100 INJECTION, SOLUTION SUBCUTANEOUS at 11:30

## 2021-06-14 RX ADMIN — HUMAN INSULIN SCH: 100 INJECTION, SOLUTION SUBCUTANEOUS at 21:00

## 2021-06-14 RX ADMIN — NICOTINE SCH MG: 14 PATCH TRANSDERMAL at 09:30

## 2021-06-14 RX ADMIN — SODIUM CHLORIDE SCH MLS: 0.9 INJECTION, SOLUTION INTRAVENOUS at 01:02

## 2021-06-14 RX ADMIN — OXYBUTYNIN CHLORIDE SCH MG: 5 TABLET ORAL at 09:31

## 2021-06-14 RX ADMIN — HUMAN INSULIN SCH UNITS: 100 INJECTION, SUSPENSION SUBCUTANEOUS at 21:21

## 2021-06-14 RX ADMIN — HYDROMORPHONE HYDROCHLORIDE PRN MG: 1 INJECTION, SOLUTION INTRAMUSCULAR; INTRAVENOUS; SUBCUTANEOUS at 22:31

## 2021-06-14 RX ADMIN — ENOXAPARIN SODIUM SCH MG: 30 INJECTION SUBCUTANEOUS at 09:00

## 2021-06-14 NOTE — RAD REPORT
EXAM DESCRIPTION:  RAD - Chest Single View - 6/14/2021 8:35 pm

 

CLINICAL HISTORY:  PICC line Placement

 

COMPARISON:  Portable April 28

 

TECHNIQUE:  AP portable chest image was obtained 6/14/2021 8:35 pm .

 

FINDINGS:  Lungs are clear. Heart and vasculature are normal. No measurable pleural effusion and no p
neumothorax. No acute bony abnormality seen. No acute aortic finding.

 

Left-sided vascular access tubing in place. Patient had a PICC line on the April 28 study. A PICC susie
e or vascular access catheter is in place with the tip in the proximal SVC.

 

IMPRESSION:  Left upper extremity vascular access catheter with tip in the proximal SVC.

## 2021-06-14 NOTE — P.PN
Subjective


Date of Service: 06/14/21


Primary Care Provider: Dr. Jack


Chief Complaint: Acute renal failure, UTI


Subjective: Improving (slight improvement in pain, but still severe at times. 

urine clearing up, tolerating diet, glucose better with snacks and soda in room)





Review of Systems


10-point ROS is otherwise unremarkable





Physical Examination





- Vital Signs


Temperature: 98.0 F


Blood Pressure: 144/93


Pulse: 71


Respirations: 16


Pulse Ox (%): 97





- Studies


Microbiology Data (last 24 hrs): 








06/09/21 19:49   Blood  - Blood   Aerobic Blood Culture - Final


                            Staph Hominis


06/09/21 19:49   Blood  - Blood   Blood Culture Gram Stain - Final








Assessment & Plan


Physician Review Additional Text: 


Physical Exam


General: Alert, appears slightly uncomfortable, Oriented x3


HEENT: Mucous membr. moist/pink, normal conjunctiva


Respiratory: Clear to auscultation bilaterally, Normal air movement


Cardiovascular: Regular rate/rhythm, Normal S1 S2, no edema


Abd: soft, mild TTP in suprapubic region


Integumentary: no rash/lesions





Problem List


Acute renal failure secondary to chronic urinary retention S/P bilateral 

ureteral stenting complicated with urinary tract infection


recurrent UTI; h/o ESBL recently


Diabetes mellitus type 2 with hyperglycemia, insulin dependent


Hypertension





-continue IV meropenem, h/o recent ESBL


-1 blood culture: staph hominis, suspect contaminant, was empirically covered, 

will discontinue


-initial urine culture grew mixed ledy, repeat NGTD - done after received IV 

antibiotics. May need to treat with continued ESBL coverage for 2 weeks, and 

will need PICC


-ID consulted


-pt has been noncompliant with medications and recommendations, refused ureteral

stent removal, unless he would have full sedation; seems more receptive now


-discussed when discharged, he will need to f/u for stent removal, will need to 

dc with de oliveira catheter, as he had urinary retention on admission


-Nephrology consulted for assistance with EROS, improved / resolved with IVF and 

de oliveira


-continue de oliveira, patient should maintain de oliveira until f/u with urology in ~2 

weeks


-significant hyperglycemia, improving.  Patient has been noncompliant and not 

using his insulin for the last several days prior to admission; has soda/snacks 

in room


-continue home medications


-continue norco, oxybutinin, has dilaudid for breakthrough





VTE: SCDs, pt had hematuria and is ambulatory 


Code: full


Dispo: anticipate dc home in ~1-2 days,  will need PICC and IV antibiotics to 

cover for possible ESBL





Time Spent Managing Pts Care (In Minutes): 40

## 2021-06-14 NOTE — CON
History Of Present Illness:  The patient is a 40-year-old male coming in with urinary tract infection
s and sepsis.  The patient is resting currently in the hospital.  He has __________ and Newton cathete
r.  The patient's urine initially was cloudy.  He had ESBL E coli in the previous admission.  At this
 time, urine culture does not grow any even though WBC is too numerous to count.  The patient is feel
ing slightly better.  His __________ has diabetes mellitus and hypertension and obesity.  Bilateral _
_________ was placed on his last admission.  No nausea, vomiting, chest pain.  Having abdominal disco
mfort in the lower region of the suprapubic area.  No other discomfort.



Past Medical History:  As per HPI.



Social History:  Tobacco positive, 1 pack per day for 10 years.  Social alcohol use.



Family History:  Noncontributory.



Medications:  The patient is on Merrem and Levaquin.



Allergies:  NO KNOWN DRUG ALLERGIES.



Review of Systems:

A 10-point review was performed.



Physical Examination:

General:  This is a 40-year-old male, lying in bed, not in any acute cardiopulmonary distress. 

Vital Signs:  Temperature 97, pulse 69, respirations 16, blood pressure 148/90. 

HEENT:  Unremarkable. 

Neck:  Supple. 

Lungs:  Clear to auscultation. 

Heart:  S1, S2.  Regular. 

Abdomen:  Soft, nontender.  Bowel sounds present. 

Extremity:  No edema.



Laboratory Data:  Shows WBC 12.8, hemoglobin 9.7, platelets 369.  Chemistry shows sodium 143, potassi
um 3.6, chloride 116, bicarb 24, BUN 90, creatinine 1.3, glucose is 99.  Micro data, blood culture on
e set is showing Staph hominis, most likely contaminant.  Repeat cultures are pending.  Urine culture
s are no growth for now.  Previous cultures done in April 2021 show E coli ESBL.  The patient was giv
en Invanz and was noncompliant with antibiotic.



Assessment And Plan:  A 40-year-old male with recurrent urinary tract infection and sepsis secondary 
to ESBL E coli, noncompliant, currently on Merrem and Levaquin.  We will recommend to continue Merrem
 for a total of 3 weeks, discontinue Levaquin.  Continue supportive care.  Staph hominis in blood mos
t likely contaminant.  Continue to monitor for other signs of infection and sepsis. 



Thank you Dr. Little for consult.





NF/MODL

DD:  06/14/2021 10:29:03Voice ID:  [id number]

DT:  06/14/2021 10:51:42Report ID:  [id number]

## 2021-06-15 LAB
BUN BLD-MCNC: 16 MG/DL (ref 7–18)
CRP SERPL-MCNC: 2.96 MG/L (ref ?–3)
GLUCOSE SERPLBLD-MCNC: 99 MG/DL (ref 74–106)
HCT VFR BLD CALC: 28.8 % (ref 39.6–49)
LYMPHOCYTES # SPEC AUTO: 3 K/UL (ref 0.7–4.9)
MAGNESIUM SERPL-MCNC: 1.8 MG/DL (ref 1.8–2.4)
PMV BLD: 9 FL (ref 7.6–11.3)
POTASSIUM SERPL-SCNC: 3.8 MMOL/L (ref 3.5–5.1)
RBC # BLD: 3.26 M/UL (ref 4.33–5.43)

## 2021-06-15 RX ADMIN — HUMAN INSULIN SCH UNIT: 100 INJECTION, SOLUTION SUBCUTANEOUS at 11:30

## 2021-06-15 RX ADMIN — HYDROMORPHONE HYDROCHLORIDE PRN MG: 1 INJECTION, SOLUTION INTRAMUSCULAR; INTRAVENOUS; SUBCUTANEOUS at 05:54

## 2021-06-15 RX ADMIN — HYDROMORPHONE HYDROCHLORIDE PRN MG: 1 INJECTION, SOLUTION INTRAMUSCULAR; INTRAVENOUS; SUBCUTANEOUS at 14:30

## 2021-06-15 RX ADMIN — NICOTINE SCH MG: 14 PATCH TRANSDERMAL at 09:16

## 2021-06-15 RX ADMIN — METOPROLOL TARTRATE SCH MG: 25 TABLET ORAL at 09:16

## 2021-06-15 RX ADMIN — HUMAN INSULIN SCH UNITS: 100 INJECTION, SUSPENSION SUBCUTANEOUS at 20:40

## 2021-06-15 RX ADMIN — MEROPENEM SCH MLS: 1 INJECTION INTRAVENOUS at 20:38

## 2021-06-15 RX ADMIN — Medication SCH ML: at 09:17

## 2021-06-15 RX ADMIN — HYDROMORPHONE HYDROCHLORIDE PRN MG: 1 INJECTION, SOLUTION INTRAMUSCULAR; INTRAVENOUS; SUBCUTANEOUS at 20:37

## 2021-06-15 RX ADMIN — METOPROLOL TARTRATE SCH MG: 25 TABLET ORAL at 20:36

## 2021-06-15 RX ADMIN — MEROPENEM SCH MLS: 1 INJECTION INTRAVENOUS at 09:15

## 2021-06-15 RX ADMIN — OXYBUTYNIN CHLORIDE SCH MG: 5 TABLET ORAL at 09:15

## 2021-06-15 RX ADMIN — OXYBUTYNIN CHLORIDE SCH MG: 5 TABLET ORAL at 14:30

## 2021-06-15 RX ADMIN — HUMAN INSULIN SCH: 100 INJECTION, SOLUTION SUBCUTANEOUS at 07:30

## 2021-06-15 RX ADMIN — HUMAN INSULIN SCH: 100 INJECTION, SOLUTION SUBCUTANEOUS at 16:30

## 2021-06-15 RX ADMIN — Medication SCH ML: at 20:38

## 2021-06-15 RX ADMIN — OXYBUTYNIN CHLORIDE SCH MG: 5 TABLET ORAL at 20:36

## 2021-06-15 RX ADMIN — HUMAN INSULIN SCH UNIT: 100 INJECTION, SUSPENSION SUBCUTANEOUS at 09:16

## 2021-06-15 RX ADMIN — ENOXAPARIN SODIUM SCH MG: 30 INJECTION SUBCUTANEOUS at 09:14

## 2021-06-15 RX ADMIN — TAMSULOSIN HYDROCHLORIDE SCH MG: 0.4 CAPSULE ORAL at 20:36

## 2021-06-15 RX ADMIN — HUMAN INSULIN SCH: 100 INJECTION, SOLUTION SUBCUTANEOUS at 20:39

## 2021-06-15 NOTE — P.PN
Subjective


Date of Service: 06/15/21


Primary Care Provider: Dr. Jack


Chief Complaint: Acute renal failure, UTI


Acute renal failure significantly improved.


Patient has no new complain.








Physical Examination





- Vital Signs


Temperature: 97.8 F


Blood Pressure: 148/94


Pulse: 78


Respirations: 16


Pulse Ox (%): 95





- Physical Exam


General: Alert, In no apparent distress


HEENT: Mucous membr. moist/pink


Neck: Supple, JVD not distended


Respiratory: Clear to auscultation bilaterally, Normal air movement


Cardiovascular: No edema, Regular rate/rhythm, Normal S1 S2


Gastrointestinal: Soft and benign, Non-distended, No tenderness


Musculoskeletal: No swelling, No tenderness


Integumentary: No rashes


Neurological: Normal speech, Normal strength at 5/5 x4 extr


Urinary: De Oliveira catheter





- Studies


Microbiology Data (last 24 hrs): 








06/09/21 20:01   Blood  - Blood   Aerobic Blood Culture - Final


                            No growth in 5 days.


06/09/21 20:01   Blood  - Blood   Anaerobic Blood Culture - Final


                            No growth in 5 days.


06/09/21 19:49   Blood  - Blood   Aerobic Blood Culture - Final


                            Staph Hominis


06/09/21 19:49   Blood  - Blood   Blood Culture Gram Stain - Final


06/09/21 19:49   Blood  - Blood   Anaerobic Blood Culture - Final


                            No growth in 5 days.








Assessment And Plan


Physician Review Additional Text: 


Problem List


Acute renal failure secondary to chronic urinary retention S/P bilateral 

ureteral stenting complicated with urinary tract infection


recurrent UTI; h/o ESBL recently


Diabetes mellitus type 2 with hyperglycemia, insulin dependent


Hypertension





-continue IV meropenem, h/o recent ESBL


-1 blood culture: staph hominis, likely skin contaminants.  Repeat blood c

ultures negative.


-initial urine culture grew mixed ledy, repeat NGTD - done after received IV 

antibiotics. May need to treat with continued ESBL coverage for 2 weeks, and 

will need PICC


-ID input appreciated.  3 weeks of IV antibiotics recommended.


-pt has been noncompliant with medications and recommendations, refused ureteral

stent removal, unless he would have full sedation; seems more receptive now.


-Case discussed with Dr. Mcgarry, Urologist at [facility] for possible 

transfer for stent removal or exchange. According to Dr. Mcgarry, it would be best

to treat the UTI before any procedure for stent removal or exchange.


-Nephrology consulted for assistance with EROS.  EROS improved.


-continue de oliveira, patient should maintain de oliveira until f/u with urology in ~2 

weeks


-blood sugar readings are better today.  Continue current insulin regimen.


-continue norco, oxybutinin.





VTE: SCDs, pt had hematuria and is ambulatory 


Code: full


Dispo: anticipate dc home in ~1-2 days,  PICC and outpatient IV antibiotics to 

complete treatment for ESBL.

## 2021-06-15 NOTE — P.PN
Subjective


Date of Service: 06/15/21


Primary Care Provider: Dr. Jack


Chief Complaint: Acute renal failure, UTI





Patient seen examined at bedside, no acute complaints.  Left arm PICC line 

placed yesterday for continuation of IV antibiotics.





Review of Systems


10-point ROS is otherwise unremarkable





Physical Examination





- Vital Signs


Temperature: 97.5 F


Blood Pressure: 123/79


Pulse: 74


Respirations: 16


Pulse Ox (%): 97





- Physical Exam


General: Alert, In no apparent distress


HEENT: Atraumatic, Normocephalic


Neck: Supple, 2+ carotid pulse no bruit


Respiratory: Clear to auscultation bilaterally, Normal air movement


Cardiovascular: No edema, Normal pulses, Other


Gastrointestinal: Normal bowel sounds, Soft and benign, Distended, Tenderness 

(to left lower quadrent )


Musculoskeletal: No clubbing, No swelling


Integumentary: No rashes, No breakdown





- Studies


                             Laboratory Last Values











WBC  13.90 K/uL (4.3-10.9)  H  06/09/21  17:24    


 


RBC  4.05 M/uL (4.33-5.43)  L  06/09/21  17:24    


 


Hgb  11.9 g/dL (13.6-17.9)  L  06/09/21  17:24    


 


Hct  35.5 % (39.6-49.0)  L  06/09/21  17:24    


 


MCV  87.8 fL ()   06/09/21  17:24    


 


MCH  29.3 pg (27.0-35.0)   06/09/21  17:24    


 


MCHC  33.4 g/dL (32.0-36.0)   06/09/21  17:24    


 


RDW  14.3 % (12.1-15.2)   06/09/21  17:24    


 


Plt Count  507 K/uL (152-406)  H  06/09/21  17:24    


 


MPV  8.5 fL (7.6-11.3)   06/09/21  17:24    


 


Neutrophils %  66.4 % (41.7-73.7)   06/09/21  17:24    


 


Lymphocytes %  18.0 % (15.3-44.8)   06/09/21  17:24    


 


Monocytes %  12.7 % (3.3-12.3)  H  06/09/21  17:24    


 


Eosinophils %  2.4 % (0-4.4)   06/09/21  17:24    


 


Basophils %  0.5 % (0-1.3)   06/09/21  17:24    


 


Absolute Neutrophils  9.2 K/uL (1.8-8.0)  H  06/09/21  17:24    


 


Absolute Lymphocytes  2.5 K/uL (0.7-4.9)   06/09/21  17:24    


 


Absolute Monocytes  1.8 K/uL (0.1-1.3)  H  06/09/21  17:24    


 


Absolute Eosinophils  0.3 K/uL (0-0.5)   06/09/21  17:24    


 


Absolute Basophils  0.1 K/uL (0-0.5)   06/09/21  17:24    


 


Sodium  129 mmol/L (136-145)  L  06/09/21  17:24    


 


Potassium  4.2 mmol/L (3.5-5.1)   06/09/21  17:24    


 


Chloride  99 mmol/L ()   06/09/21  17:24    


 


Carbon Dioxide  22 mmol/L (21-32)   06/09/21  17:24    


 


BUN  50 mg/dL (7-18)  H  06/09/21  17:24    


 


Creatinine  2.93 mg/dL (0.55-1.3)  H  06/09/21  17:24    


 


Estimated GFR  24 mL/min (=/>90)  L  06/09/21  17:24    


 


Glucose  578 mg/dL ()  H*  06/09/21  17:24    


 


Lactic Acid  1.8 mmol/L (0.4-2.0)   06/09/21  19:49    


 


Calcium  9.0 mg/dL (8.5-10.1)   06/09/21  17:24    


 


Total Bilirubin  0.3 mg/dL (0.2-1.0)   06/09/21  17:24    


 


Direct Bilirubin  < 0.1 mg/dL (0-0.2)   06/09/21  17:24    


 


AST  5 U/L (15-37)  L  06/09/21  17:24    


 


ALT  11 U/L (12-78)  L  06/09/21  17:24    


 


Alkaline Phosphatase  170 U/L ()  H  06/09/21  17:24    


 


Serum Total Protein  8.4 g/dL (6.4-8.2)  H  06/09/21  17:24    


 


Albumin  3.0 g/dL (3.4-5.0)  L  06/09/21  17:24    


 


Globulin  5.4 g/dL (2.3-3.5)  H  06/09/21  17:24    


 


Albumin/Globulin Ratio  0.6  (1.1-1.8)  L  06/09/21  17:24    


 


Lipase  154 U/L ()   06/09/21  17:24    


 


Procalcitonin  15.48 ng/mL (<0.050)  H  06/09/21  17:24    


 


Urine pH  5.5  (5.0-7.0)   06/09/21  17:11    


 


Ur Specific Gravity  1.010  (1.005-1.030)   06/09/21  17:11    


 


Glucose (UA)(Auto)  2+  (Negative)  H  06/09/21  17:11    


 


Urine Ketones  Negative  (Negative)   06/09/21  17:11    


 


Urine Blood  2+  (Negative)  H  06/09/21  17:11    


 


Urine Nitrite  Negative  (Negative)   06/09/21  17:11    


 


Ur Leukocyte Esterase  3+  (Negative)  H  06/09/21  17:11    


 


Urine Total Protein  2+  (Negative)  H  06/09/21  17:11    











Microbiology Data (last 24 hrs): 








06/09/21 20:01   Blood  - Blood   Aerobic Blood Culture - Final


                            No growth in 5 days.


06/09/21 20:01   Blood  - Blood   Anaerobic Blood Culture - Final


                            No growth in 5 days.


06/09/21 19:49   Blood  - Blood   Aerobic Blood Culture - Final


                            Staph Hominis


06/09/21 19:49   Blood  - Blood   Blood Culture Gram Stain - Final


06/09/21 19:49   Blood  - Blood   Anaerobic Blood Culture - Final


                            No growth in 5 days.








Assessment And Plan





- Plan





Antibiotics


Meropenem


Start:  6/10


Stop:  7/1


DC


Levaquin


Start:  6/13


stop: 6/14





Assessment:


-ESBL E coli UTI


-diabetes mellitus type 2


-obesity


-anemia


-EROS


Plan: 


-continue IV meropenem for total duration of 3 weeks due to patient's history of

recurrent urinary tract infections as well as bilateral urethral stent 

placement.  Patient has left arm PICC line placed.


-blood cultures:  Blood cultures taken on 06/09 1/4 bottles grew Staph hominis 

repeat cultures taken on 06/13 showed no growth.  Positive blood culture likely 

due to contamination. 


-leukocytosis:  WBC still elevated however is down trending.


-medical management per primary team


-continue monitor for signs and symptoms of infection


Plan of care discussed with Dr. Disla.


Thank you for consultation

## 2021-06-15 NOTE — P.PN
Subjective


Date of Service: 06/15/21


Primary Care Provider: Dr. Jack


Chief Complaint: Acute renal failure, UTI





patient seen/examined. reviewed with Dr. Vann (hospitalist).


clinically looks well. 


esbl/ureteral stents. ID following. on abx.


renal function based on creatinine oveall staibilizing. still has de oliveira in.





vs stable. 123/79, pulse 74 regular rr 14 and comfortable.


afebrile





02 sats mid 90s on room air.


alert/in good mood.





lungs cta


cvs regula


abd soft


ext no edema. de oliveira bag with yellow urine.





a/p:


uti/ureteral stents/ckd/eros/has indwelling de oliveira:





needs urology f/u. ? timing /need for change/removal of de oliveira and ureteral 

stents. ? etiology of obstruction requiring the stents.


on abx and will need it for 3 wks or so as per ID.


high risk for sepsis.


renal function stabilizing/volume status is euvolemic. bicarb/potassium ok. 

eating /drinking better. advised to keep hydrated.


cr: 1.81 to 1.46 to 1.36 and now around 1.44.


follows with Dr. Clark. Advised to keep follow up for monitoring and 

evaluation of ckd.





Physical Examination





- Vital Signs


Temperature: 97.5 F


Blood Pressure: 123/79


Pulse: 74


Respirations: 16


Pulse Ox (%): 97





- Studies


Microbiology Data (last 24 hrs): 








06/09/21 20:01   Blood  - Blood   Aerobic Blood Culture - Final


                            No growth in 5 days.


06/09/21 20:01   Blood  - Blood   Anaerobic Blood Culture - Final


                            No growth in 5 days.


06/09/21 19:49   Blood  - Blood   Aerobic Blood Culture - Final


                            Staph Hominis


06/09/21 19:49   Blood  - Blood   Blood Culture Gram Stain - Final


06/09/21 19:49   Blood  - Blood   Anaerobic Blood Culture - Final


                            No growth in 5 days.








Assessment And Plan


Physician Review Additional Text: 


Physical Exam


General: Alert, appears slightly uncomfortable, Oriented x3


HEENT: Mucous membr. moist/pink, normal conjunctiva


Respiratory: Clear to auscultation bilaterally, Normal air movement


Cardiovascular: Regular rate/rhythm, Normal S1 S2, no edema


Abd: soft, mild TTP in suprapubic region


Integumentary: no rash/lesions





Problem List


Acute renal failure secondary to chronic urinary retention S/P bilateral 

ureteral stenting complicated with urinary tract infection


recurrent UTI; h/o ESBL recently


Diabetes mellitus type 2 with hyperglycemia, insulin dependent


Hypertension





-continue IV meropenem, h/o recent ESBL


-1 blood culture: staph hominis, suspect contaminant, was empirically covered, 

will discontinue


-initial urine culture grew mixed ledy, repeat NGTD - done after received IV 

antibiotics. May need to treat with continued ESBL coverage for 2 weeks, and 

will need PICC


-ID consulted


-pt has been noncompliant with medications and recommendations, refused ureteral

stent removal, unless he would have full sedation; seems more receptive now


-discussed when discharged, he will need to f/u for stent removal, will need to 

dc with de oliveira catheter, as he had urinary retention on admission


-Nephrology consulted for assistance with EROS, improved / resolved with IVF and 

de oliveira


-continue de oliveira, patient should maintain de oliveira until f/u with urology in ~2 

weeks


-significant hyperglycemia, improving.  Patient has been noncompliant and not 

using his insulin for the last several days prior to admission; has soda/snacks 

in room


-continue home medications


-continue norco, oxybutinin, has dilaudid for breakthrough





VTE: SCDs, pt had hematuria and is ambulatory 


Code: full


Dispo: anticipate dc home in ~1-2 days,  will need PICC and IV antibiotics to 

cover for possible ESBL

## 2021-06-16 VITALS — SYSTOLIC BLOOD PRESSURE: 141 MMHG | DIASTOLIC BLOOD PRESSURE: 93 MMHG | TEMPERATURE: 98.4 F

## 2021-06-16 VITALS — OXYGEN SATURATION: 95 %

## 2021-06-16 RX ADMIN — NICOTINE SCH MG: 14 PATCH TRANSDERMAL at 09:29

## 2021-06-16 RX ADMIN — OXYBUTYNIN CHLORIDE SCH MG: 5 TABLET ORAL at 09:29

## 2021-06-16 RX ADMIN — MEROPENEM SCH MLS: 1 INJECTION INTRAVENOUS at 09:30

## 2021-06-16 RX ADMIN — HUMAN INSULIN SCH: 100 INJECTION, SOLUTION SUBCUTANEOUS at 11:30

## 2021-06-16 RX ADMIN — HYDROMORPHONE HYDROCHLORIDE PRN MG: 1 INJECTION, SOLUTION INTRAMUSCULAR; INTRAVENOUS; SUBCUTANEOUS at 05:29

## 2021-06-16 RX ADMIN — ENOXAPARIN SODIUM SCH MG: 30 INJECTION SUBCUTANEOUS at 09:29

## 2021-06-16 RX ADMIN — METOPROLOL TARTRATE SCH MG: 25 TABLET ORAL at 09:29

## 2021-06-16 RX ADMIN — HUMAN INSULIN SCH: 100 INJECTION, SOLUTION SUBCUTANEOUS at 07:30

## 2021-06-16 RX ADMIN — Medication SCH ML: at 09:30

## 2021-06-16 RX ADMIN — HYDROCODONE BITARTRATE AND ACETAMINOPHEN PRN TAB: 7.5; 325 TABLET ORAL at 12:17

## 2021-06-16 RX ADMIN — HUMAN INSULIN SCH: 100 INJECTION, SUSPENSION SUBCUTANEOUS at 07:49

## 2021-06-16 NOTE — P.DS
Admission Date: 06/09/21


Discharge Date: 06/16/21


Primary Care Provider: Dr. Jack


Disposition: ROUTINE DISCHARGE


Discharge Condition: FAIR


Reason for Admission: Acute renal failure, UTI


Brief History of Present Illness: 


40-year-old gentleman with a history of diabetes mellitus type 2, hypertension, 

urinary retention presented to the emergency department with a complaint of 

urinary retention back pain and suprapubic pain.  He was recently admitted for 

urinary retention hydronephrosis acute renal failure and UTI with E. his BAL 

infection.  Patient was treated with IV meropenem, had urethral stent placed 

bilaterally, needed a De Oliveira catheter throughout admission and subsequently 

discharged with a De Oliveira catheter retained.  Keep followed up with urology as an 

outpatient a week later to have the stent removed.  Patient refused to have the 

stent removed without general anesthesia.  This time in the ED, his creatinine 

noted to be 2.93, glucose elevated to 578, pro calcitonin was 15.48.  UA 

suggested the presence of UTI.  Urology was contacted who recommended removal of

the ureteral stent at some point.  Patient was admitted for further management.





Hospital Course: 


Diagnosis


Acute renal failure secondary to chronic urinary retention S/P bilateral 

ureteral stenting complicated with urinary tract infection


recurrent UTI; h/o ESBL recently


Diabetes mellitus type 2 with hyperglycemia, insulin dependent


Hypertension





Patient admitted to the medical floor


-he was treated with IV meropenem given h/o recent ESBL


-1 blood culture: staph hominis, likely skin contaminants.  Repeat blood 

cultures negative.


-initial urine culture grew mixed ledy, repeat urine culture yielded no growth.

 Patient seen by ID who recommended to treat ESBL for 3 weeks.


-PICC line placed


-Pt has been noncompliant with medications and recommendations, refused ureteral

stent removal without general anesthesia but now eceptive to having the stent 

removed.


-Case discussed with Dr. Mcgarry, Urologist at [facility] for possible 

transfer for stent removal or exchange. According to Dr. Mcgarry, it would be best

to treat the UTI before any procedure for stent removal or exchange.  Case also 

discussed with Dr. Gallardo who recommended outpatient follow up with him in the 

office for the stent removal after he has been adequately treated with 

antibiotics.


-Nephrology consulted for assistance with EROS.  EROS improved with treatment.


-continued de oliveira, patient should maintain de oliveira until f/u with urology in ~2 

weeks


-blood sugar was managed with insulin sliding scale and Novolin 70/30.


- patient discharged with Novolin 70/30 30 units b.i.d.


-also continue Flomax.


-patient briefly treated with oxybutynin.


-he is discharged to continue outpatient IV Invanz to be administered in this 

facility.





Vital Signs/Physical Exam: 














Temp Pulse Resp BP Pulse Ox


 


 98.4 F   69   18   141/93 H  96 


 


 06/16/21 12:00  06/16/21 12:00  06/16/21 12:17  06/16/21 12:00  06/16/21 12:17








General: Alert, In no apparent distress, Oriented x3


HEENT: Mucous membr. moist/pink


Neck: Supple, JVD not distended


Respiratory: Clear to auscultation bilaterally, Normal air movement


Cardiovascular: No edema, Regular rate/rhythm, Normal S1 S2


Gastrointestinal: Soft and benign, Non-distended, No tenderness


Musculoskeletal: No swelling, No tenderness


Integumentary: No rashes


Neurological: Normal speech, Normal strength at 5/5 x4 extr


Urinary: De Oliveira catheter


Laboratory Data at Discharge: 














WBC  12.30 K/uL (4.3-10.9)  H  06/15/21  06:00    


 


Hgb  9.8 g/dL (13.6-17.9)  L  06/15/21  06:00    


 


Hct  28.8 % (39.6-49.0)  L  06/15/21  06:00    


 


Plt Count  373 K/uL (152-406)   06/15/21  06:00    


 


Sodium  144 mmol/L (136-145)   06/15/21  06:00    


 


Potassium  3.8 mmol/L (3.5-5.1)   06/15/21  06:00    


 


BUN  16 mg/dL (7-18)   06/15/21  06:00    


 


Creatinine  1.44 mg/dL (0.55-1.3)  H  06/15/21  06:00    


 


Glucose  99 mg/dL ()   06/15/21  06:00    


 


Uric Acid  10.3 mg/dL (3.5-7.2)  H D 06/11/21  05:05    


 


Phosphorus  Cancelled   06/14/21  05:00    


 


Magnesium  1.8 mg/dL (1.8-2.4)   06/15/21  06:00    


 


Total Bilirubin  0.2 mg/dL (0.2-1.0)   06/12/21  05:07    


 


AST  7 U/L (15-37)  L  06/12/21  05:07    


 


ALT  10 U/L (12-78)  L  06/12/21  05:07    


 


Alkaline Phosphatase  108 U/L ()   06/12/21  05:07    


 


Triglycerides  369 mg/dL (<150)  H  06/14/21  04:54    


 


Lipase  154 U/L ()   06/09/21  17:24    








Home Medications: 








Folic Acid 1 mg PO DAILY 06/10/21 


Metoprolol Tartrate 25 mg PO BID 06/10/21 


Nicotine [Nicoderm*] 14 mg TD DAILY 06/10/21 


Tramadol HCl [Ultram] 50 mg PO TID PRN 06/10/21 


hydroCHLOROthiazide [Hydrochlorothiazide] 25 mg PO DAILY 06/10/21 


Ertapenem Na [Invanz] 1 gm IVPB DAILY #14 vial 06/16/21 


Insulin 70/30 NPH/Reg Human [Novolin 70/30*] 30 unit SQ BID #10 ml 06/16/21 


Tamsulosin [Flomax*] 0.4 mg PO BEDTIME #30 cap 06/16/21 





New Medications: 


Tamsulosin [Flomax*] 0.4 mg PO BEDTIME #30 cap


Ertapenem Na [Invanz] 1 gm IVPB DAILY #14 vial


Insulin 70/30 NPH/Reg Human [Novolin 70/30*] 30 unit SQ BID #10 ml


Followup: 


OOT,OOT [Primary Care Provider] - 


Godfrey Gallardo [ACTIVE - CAN ADMIT] -  (Within 2 weeks.)


Time spent managing pt's care (in minutes): 37

## 2021-06-16 NOTE — P.PN
Subjective


Date of Service: 06/16/21


Primary Care Provider: Dr. Jack


Chief Complaint: Acute renal failure, UTI





Patient seen examined at bedside, no acute complaints.  No new labs back as of 

yet today.





Review of Systems


10-point ROS is otherwise unremarkable





Physical Examination





- Vital Signs


Temperature: 97.9 F


Blood Pressure: 130/82


Pulse: 64


Respirations: 16


Pulse Ox (%): 95





Assessment And Plan





- Plan


General: Alert, In no apparent distress


HEENT: Atraumatic, Normocephalic


Neck: Supple, 2+ carotid pulse no bruit


Respiratory: Clear to auscultation bilaterally, Normal air movement


Cardiovascular: No edema, Normal pulses, Other


Gastrointestinal: Normal bowel sounds, Soft and benign, Distended, Tenderness 

(to left lower quadrent )


Musculoskeletal: No clubbing, No swelling


Integumentary: No rashes, No breakdown


Antibiotics


Meropenem


Start:  6/10


Stop:  7/1


DC


Levaquin


Start:  6/13


stop: 6/14





Assessment:


-ESBL E coli UTI


-diabetes mellitus type 2


-obesity


-anemia


-EROS


Plan: 


-continue IV meropenem for total duration of 3 weeks due to patient's history of

recurrent urinary tract infections as well as bilateral urethral stent 

placement.  Patient has left arm PICC line placed.


-blood cultures:  Blood cultures taken on 06/09 1/4 bottles grew Staph hominis 

repeat cultures taken on 06/13 showed no growth.  Positive blood culture likely 

due to contamination. 


-leukocytosis:  WBC still elevated however is down trending.


-medical management per primary team


-continue monitor for signs and symptoms of infection


Plan of care discussed with Dr. Disla.


Thank you for consultation


Physician Review Additional Text: 


Problem List


Acute renal failure secondary to chronic urinary retention S/P bilateral 

ureteral stenting complicated with urinary tract infection


recurrent UTI; h/o ESBL recently


Diabetes mellitus type 2 with hyperglycemia, insulin dependent


Hypertension





-continue IV meropenem, h/o recent ESBL


-1 blood culture: staph hominis, likely skin contaminants.  Repeat blood 

cultures negative.


-initial urine culture grew mixed ledy, repeat NGTD - done after received IV 

antibiotics. May need to treat with continued ESBL coverage for 2 weeks, and 

will need PICC


-ID input appreciated.  3 weeks of IV antibiotics recommended.


-pt has been noncompliant with medications and recommendations, refused ureteral

stent removal, unless he would have full sedation; seems more receptive now.


-Case discussed with Dr. Mcgarry, Urologist at [facility] for possible 

transfer for stent removal or exchange. According to Dr. Mcgarry, it would be best

to treat the UTI before any procedure for stent removal or exchange.


-Nephrology consulted for assistance with EROS.  EROS improved.


-continue de oliveira, patient should maintain de oliveira until f/u with urology in ~2 

weeks


-blood sugar readings are better today.  Continue current insulin regimen.


-continue norco, oxybutinin.





VTE: SCDs, pt had hematuria and is ambulatory 


Code: full


Dispo: anticipate dc home in ~1-2 days,  PICC and outpatient IV antibiotics to 

complete treatment for ESBL.

## 2021-06-23 ENCOUNTER — HOSPITAL ENCOUNTER (EMERGENCY)
Dept: HOSPITAL 97 - ER | Age: 41
Discharge: HOME | End: 2021-06-23
Payer: SELF-PAY

## 2021-06-23 VITALS — TEMPERATURE: 98 F | SYSTOLIC BLOOD PRESSURE: 131 MMHG | OXYGEN SATURATION: 99 % | DIASTOLIC BLOOD PRESSURE: 100 MMHG

## 2021-06-23 DIAGNOSIS — Z46.6: Primary | ICD-10-CM

## 2021-06-23 PROCEDURE — 99282 EMERGENCY DEPT VISIT SF MDM: CPT

## 2021-06-23 NOTE — EDPHYS
Physician Documentation                                                                           

 [facility]                                                                 

Name: Julio Cesar Reeves                                                                                

Age: 40 yrs                                                                                       

Sex: Male                                                                                         

: 1980                                                                                   

MRN: [id number]                                                                                   

Arrival Date: 2021                                                                          

Time: 12:15                                                                                       

Account#: [id number]                                                                            

Bed 26                                                                                            

Private MD:                                                                                       

ED Physician Tima Little                                                                         

HPI:                                                                                              

                                                                                             

14:40 This 40 yrs old  Male presents to ER via Ambulatory with complaints of Urinary  cp  

      Cath Removal.                                                                               

14:40 The patient presents with requests removal of De Oliveira catheter.                           cp  

14:40 Patient reports De Oliveira catheter was placed 2 weeks ago during hospitalization and        cp  

      patient was instructed to have catheter removed in 2 weeks by DR Gallardo. Wife reports      

      patient recently received letter informing them patient would be unable to f/u with DR Gallardo for unknown reason so they are currently in process of finding new urologist to     

      f/u. No complaints expressed today.                                                         

                                                                                                  

Historical:                                                                                       

- Allergies:                                                                                      

12:29 No Known Allergies;                                                                     tw2 

- PMHx:                                                                                           

12:29 Diabetes - NIDDM; kidney infection; Hypertension;                                       tw2 

                                                                                                  

- Immunization history:: Adult Immunizations.                                                     

- Social history:: Smoking status: .                                                              

                                                                                                  

                                                                                                  

ROS:                                                                                              

14:45 Eyes: Negative for injury, pain, redness, and discharge.                                cp  

14:45 Constitutional: Negative for body aches, chills, fever, poor PO intake.                     

14:45 ENT: Negative for ear pain, sore throat, difficulty swallowing, difficulty handling         

      secretions.                                                                                 

14:45 Cardiovascular: Negative for chest pain, palpitations.                                      

14:45 Respiratory: Negative for cough, shortness of breath, wheezing.                             

14:45 Abdomen/GI: Negative for abdominal pain, nausea, vomiting, and diarrhea.                    

14:45 Back: Negative for radiated pain.                                                           

14:45 : Negative for urinary symptoms, testicular pain                                          

14:45 Neuro: Negative for altered mental status, headache.                                        

14:45 All other systems are negative.                                                             

                                                                                                  

Exam:                                                                                             

14:47 Constitutional: The patient appears in no acute distress, alert, awake, comfortable,    cp  

      non-toxic, well developed, well nourished.                                                  

14:47 Head/Face:  Normocephalic, atraumatic.                                                  cp  

14:47 Chest/axilla: Inspection: normal.                                                           

14:47 Cardiovascular: Rate: normal.                                                               

14:47 Respiratory: the patient does not display signs of respiratory distress,  Respirations:     

      normal, no use of accessory muscles, no retractions.                                        

14:47 Abdomen/GI: Inspection: abdomen appears normal, Palpation: abdomen is soft and              

      non-tender, in all quadrants.                                                               

14:47 Neuro: Orientation: to person, place \T\ time. Mentation: is normal.                        

                                                                                                  

Vital Signs:                                                                                      

12:27  / 100; Pulse 88; Resp 17; Temp 98(TE); Pulse Ox 99% on R/A;                      tw2 

                                                                                                  

MDM:                                                                                              

14:40 Patient medically screened.                                                             cp  

15:05 Differential diagnosis: UTI, urinary retention, De Oliveira catheter problem.                 cp  

15:10 Data reviewed: vital signs, nurses notes, and as a result, I will discharge patient.    cp  

15:10 Counseling: I had a detailed discussion with the patient and/or guardian regarding: the cp  

      historical points, exam findings, and any diagnostic results supporting the                 

      discharge/admit diagnosis.                                                                  

                                                                                                  

Administered Medications:                                                                         

No medications were administered                                                                  

                                                                                                  

                                                                                                  

Disposition:                                                                                      

21 15:11 Discharged to Home. Impression: Encounter for attention to dressings, sutures      

  and drains - removal of de oliveira catheter.                                                         

- Condition is Stable.                                                                            

                                                                                                  

- Prescriptions for nystatin 100,000 unit/gram Topical ointment - apply 1 application             

  by TOPICAL route 3 times per day for 8-10 days; 30 gram.                                        

- Medication Reconciliation Form, Thank You Letter, Antibiotic Education, Prescription            

  Opioid Use form.                                                                                

- Follow up: Jun Peter MD; When: 1 - 2 days; Reason: Recheck today's complaints.           

- Problem is an ongoing problem.                                                                  

- Symptoms have improved.                                                                         

                                                                                                  

                                                                                                  

                                                                                                  

Addendum:                                                                                         

2021                                                                                        

     07:03 Co-signature as Attending Physician, Tima Little MD.                                    r
n

                                                                                                  

Signatures:                                                                                       

Tima Little MD MD rn Page, Corey, PA PA   cp                                                   

Courtney Whelan RN                          RN   tw2                                                  

Xochilt Jack RN                    RN   vg1                                                  

                                                                                                  

Corrections: (The following items were deleted from the chart)                                    

                                                                                             

15:19 15:11 2021 15:11 Discharged to Home. Impression: Encounter for attention to       vg1 

      dressings, sutures and drains - removal of de oliveira catheter. Condition is Stable. Forms       

      are Medication Reconciliation Form, Thank You Letter, Antibiotic Education,                 

      Prescription Opioid Use. Follow up: Jun Peter; When: 1 - 2 days; Reason: Recheck       

      today's complaints. Problem is an ongoing problem. Symptoms have improved. cp               

                                                                                             

11:31 11:28 Constitutional: Negative for body aches, chills, fever, poor PO intake, cp        cp  

 11: Cardiovascular: Negative for chest pain, palpitations, cp                         cp  

: Eyes: Negative for injury, pain, redness, and discharge, cp                       cp  

: ENT: Negative for ear pain, sore throat, difficulty swallowing, difficulty        cp  

      handling secretions, cp                                                                     

: Respiratory: Negative for cough, shortness of breath, wheezing, cp                cp  

: Abdomen/GI: Negative for abdominal pain, nausea, vomiting, and diarrhea, cp       cp  

: Back: Negative for radiated pain, cp                                              cp  

: : Negative for urinary symptoms, testicular pain cp                             cp  

: Neuro: Negative for altered mental status, headache, cp                           cp  

: All other systems are negative, cp                                                cp  

                                                                                                  

**************************************************************************************************

## 2021-06-23 NOTE — ER
Nurse's Notes                                                                                     

 [facility]                                                                 

Name: Julio Cesar Reeves                                                                                

Age: 40 yrs                                                                                       

Sex: Male                                                                                         

: 1980                                                                                   

MRN: [id number]                                                                                   

Arrival Date: 2021                                                                          

Time: 12:15                                                                                       

Account#: [id number]                                                                            

Bed 26                                                                                            

Private MD:                                                                                       

Diagnosis: Encounter for attention to dressings, sutures and drains-removal of de oliveira catheter     

                                                                                                  

Presentation:                                                                                     

                                                                                             

12:27 Chief complaint: Patient states: i got this catheter in 2 weeks ago and the doctor told tw2 

      me i should have it removed in 2 weeks. i was discharged from here a week ago and home      

      for a week and so its 2 weeks. Coronavirus screen: At this time, the client does not        

      indicate any symptoms associated with coronavirus-19. Ebola Screen: Patient denies          

      travel to an Ebola-affected area in the 21 days before illness onset. Initial Sepsis        

      Screen: Does the patient meet any 2 criteria? No. Patient's initial sepsis screen is        

      negative. Does the patient have a suspected source of infection? No. Patient's initial      

      sepsis screen is negative. Risk Assessment: Do you want to hurt yourself or someone         

      else? Patient reports no desire to harm self or others. Onset of symptoms was 2021.                                                                                       

12:27 Method Of Arrival: Ambulatory                                                           tw2 

12:27 Acuity: KAREEM 3                                                                           tw2 

                                                                                                  

Triage Assessment:                                                                                

12:29 General: Appears in no apparent distress. obese, well groomed, Behavior is calm,        tw2 

      cooperative, appropriate for age. Pain: Denies pain. : to gravity drainage in place       

      for 2 weeks, yellow urine noted in specimen collection bag.                                 

                                                                                                  

Historical:                                                                                       

- Allergies:                                                                                      

12:29 No Known Allergies;                                                                     tw2 

- PMHx:                                                                                           

12:29 Diabetes - NIDDM; kidney infection; Hypertension;                                       tw2 

                                                                                                  

- Immunization history:: Adult Immunizations.                                                     

- Social history:: Smoking status: .                                                              

                                                                                                  

                                                                                                  

Screening:                                                                                        

15:00 Abuse screen: Denies threats or abuse. Nutritional screening: No deficits noted.        vg1 

      Tuberculosis screening: No symptoms or risk factors identified. Fall Risk No fall in        

      past 12 months (0 pts). No secondary diagnosis (0 pts). No IV (0 pts). Ambulatory Aid-      

      None/Bed Rest/Nurse Assist (0 pts). Gait- Normal/Bed Rest/Wheelchair (0 pts) Mental         

      Status- Oriented to own ability (0 pts). Total Johnson Fall Scale indicates No Risk (0-24     

      pts).                                                                                       

                                                                                                  

Assessment:                                                                                       

14:59 General: Appears in no apparent distress. comfortable. Pain: Denies pain. Neuro: Level  vg1 

      of Consciousness is awake, alert, obeys commands, Oriented to person, place, time,          

      situation. Cardiovascular: Patient's skin is warm and dry. Respiratory: Airway is           

      patent Respiratory effort is even, unlabored. GI: No signs and/or symptoms were             

      reported involving the gastrointestinal system. : De Oliveira in place Urine is clear.          

      EENT: No signs and/or symptoms were reported regarding the EENT system. Derm: Skin is       

      intact, is healthy with good turgor. Musculoskeletal: Circulation, motion, and              

      sensation intact.                                                                           

                                                                                                  

Vital Signs:                                                                                      

12:27  / 100; Pulse 88; Resp 17; Temp 98(TE); Pulse Ox 99% on R/A;                      tw2 

                                                                                                  

ED Course:                                                                                        

12:15 Patient arrived in ED.                                                                  wm  

12:29 Triage completed.                                                                       tw2 

12:31 Arm band placed on.                                                                     tw2 

14:39 Xochilt Jack, RN is Primary Nurse.                                                  vg1 

14:39 Tha Milan PA is PHCP.                                                                cp  

14:39 Tima Little MD is Attending Physician.                                                cp  

15:00 Patient has correct armband on for positive identification. Bed in low position. Call   vg1 

      light in reach. Side rails up X 1. Adult w/ patient.                                        

15:10 Jun Peter MD is Referral Physician.                                              cp  

15:18 No provider procedures requiring assistance completed. Patient did not have IV access   vg1 

      during this emergency room visit.                                                           

                                                                                                  

Administered Medications:                                                                         

No medications were administered                                                                  

                                                                                                  

                                                                                                  

Outcome:                                                                                          

15:11 Discharge ordered by MD.                                                                cp  

15:18 Discharged to home ambulatory.                                                          vg1 

15:18 Condition: stable                                                                           

15:18 Discharge instructions given to patient, Instructed on discharge instructions, follow       

      up and referral plans. medication usage, Demonstrated understanding of instructions,        

      follow-up care, medications, Prescriptions given X 1.                                       

15:19 Patient left the ED.                                                                    vg1 

                                                                                                  

Signatures:                                                                                       

Tha Milan PA PA cp Wise, Tara, RN                          RN   tw2                                                  

Xochilt Jack RN                    RN   vg1                                                  

Elizabeth Ochoa                                                                                    

                                                                                                  

**************************************************************************************************

## 2021-08-03 ENCOUNTER — HOSPITAL ENCOUNTER (EMERGENCY)
Dept: HOSPITAL 97 - ER | Age: 41
Discharge: HOME | End: 2021-08-03
Payer: SELF-PAY

## 2021-08-03 VITALS — TEMPERATURE: 98.1 F

## 2021-08-03 VITALS — DIASTOLIC BLOOD PRESSURE: 115 MMHG | OXYGEN SATURATION: 98 % | SYSTOLIC BLOOD PRESSURE: 171 MMHG

## 2021-08-03 DIAGNOSIS — R33.9: ICD-10-CM

## 2021-08-03 DIAGNOSIS — Z79.4: ICD-10-CM

## 2021-08-03 DIAGNOSIS — F17.210: ICD-10-CM

## 2021-08-03 DIAGNOSIS — E10.65: ICD-10-CM

## 2021-08-03 DIAGNOSIS — N39.0: Primary | ICD-10-CM

## 2021-08-03 DIAGNOSIS — E10.22: ICD-10-CM

## 2021-08-03 DIAGNOSIS — N18.9: ICD-10-CM

## 2021-08-03 DIAGNOSIS — I12.9: ICD-10-CM

## 2021-08-03 LAB
ALBUMIN SERPL BCP-MCNC: 3.5 G/DL (ref 3.4–5)
ALP SERPL-CCNC: 158 U/L (ref 45–117)
ALT SERPL W P-5'-P-CCNC: 19 U/L (ref 12–78)
AST SERPL W P-5'-P-CCNC: 9 U/L (ref 15–37)
BUN BLD-MCNC: 25 MG/DL (ref 7–18)
GLUCOSE SERPLBLD-MCNC: 263 MG/DL (ref 74–106)
HCT VFR BLD CALC: 35.6 % (ref 39.6–49)
LIPASE SERPL-CCNC: 134 U/L (ref 73–393)
LYMPHOCYTES # SPEC AUTO: 3.1 K/UL (ref 0.7–4.9)
PMV BLD: 8.4 FL (ref 7.6–11.3)
POTASSIUM SERPL-SCNC: 3.9 MMOL/L (ref 3.5–5.1)
RBC # BLD: 3.93 M/UL (ref 4.33–5.43)

## 2021-08-03 PROCEDURE — 76377 3D RENDER W/INTRP POSTPROCES: CPT

## 2021-08-03 PROCEDURE — 80048 BASIC METABOLIC PNL TOTAL CA: CPT

## 2021-08-03 PROCEDURE — 85025 COMPLETE CBC W/AUTO DIFF WBC: CPT

## 2021-08-03 PROCEDURE — 96375 TX/PRO/DX INJ NEW DRUG ADDON: CPT

## 2021-08-03 PROCEDURE — 74176 CT ABD & PELVIS W/O CONTRAST: CPT

## 2021-08-03 PROCEDURE — 83690 ASSAY OF LIPASE: CPT

## 2021-08-03 PROCEDURE — 96374 THER/PROPH/DIAG INJ IV PUSH: CPT

## 2021-08-03 PROCEDURE — 82947 ASSAY GLUCOSE BLOOD QUANT: CPT

## 2021-08-03 PROCEDURE — 87086 URINE CULTURE/COLONY COUNT: CPT

## 2021-08-03 PROCEDURE — 81003 URINALYSIS AUTO W/O SCOPE: CPT

## 2021-08-03 PROCEDURE — 99284 EMERGENCY DEPT VISIT MOD MDM: CPT

## 2021-08-03 PROCEDURE — 87088 URINE BACTERIA CULTURE: CPT

## 2021-08-03 PROCEDURE — 80076 HEPATIC FUNCTION PANEL: CPT

## 2021-08-03 PROCEDURE — 36415 COLL VENOUS BLD VENIPUNCTURE: CPT

## 2021-08-03 PROCEDURE — 51702 INSERT TEMP BLADDER CATH: CPT

## 2021-08-03 PROCEDURE — 71045 X-RAY EXAM CHEST 1 VIEW: CPT

## 2021-08-03 PROCEDURE — 83605 ASSAY OF LACTIC ACID: CPT

## 2021-08-03 NOTE — EDPHYS
Physician Documentation                                                                           

 [facility]                                                                 

Name: Julio Cesar Reeves                                                                                

Age: 40 yrs                                                                                       

Sex: Male                                                                                         

: 1980                                                                                   

MRN: [id number]                                                                                   

Arrival Date: 2021                                                                          

Time: 03:33                                                                                       

Account#: [id number]                                                                            

Bed 8                                                                                             

Private MD:                                                                                       

[facility] Physician Tha Edwards                                                                      

HPI:                                                                                              

                                                                                             

04:28 This 40 yrs old  Male presents to ER via Ambulatory with complaints of Low Back sheila 

      Pain, Bladder Pain, Urinating blood.                                                        

04:28 The patient presents with pain that is acute, with no known mechanism of injury. The    sheila 

      symptoms are located in the low back, lumbar area, left low back, left mid back, right      

      mid back and right low back. The pain does not radiate. The problem was sustained from      

      unknown cause. Onset: The symptoms/episode began/occurred 3 day(s) ago. Modifying           

      factors: The patient symptoms are alleviated by nothing, the patient symptoms are           

      aggravated by nothing.                                                                      

04:29 Associated signs and symptoms: Pertinent positives: abdominal pain, dysuria. The        sheila 

      patient presents with abdominal pain in the lower abdomen, abdominal distention in the      

      upper abdomen, in the lower abdomen. The patient presents with urinary symptoms,            

      dysuria, urinary frequency.                                                                 

                                                                                                  

Historical:                                                                                       

- Allergies:                                                                                      

04:03 No Known Allergies;                                                                     bb  

- Home Meds:                                                                                      

04:03 gabapentin oral [Active]; Oxybutynin Chloride Oral [Active]; Metoprolol Tartrate Oral   bb  

      [Active]; Hydrochlorothiazide Oral [Active]; tamsulosin oral [Active]; Insulin: Regular     

      Sub-Q [Active]; Novolin 70/30 [place] Sub-Q [Active];                                       

- PMHx:                                                                                           

04:03 Hypertension; kidney infection; IDDM;                                                   bb  

- PSHx:                                                                                           

04:03 kidney stents;                                                                          bb  

                                                                                                  

- Immunization history:: Adult Immunizations up to date, Client reports having NOT                

  received the Covid vaccine.                                                                     

- Social history:: Smoking status: Patient reports the use of cigarette tobacco                   

  products, smokes one pack cigarettes per day.                                                   

- Family history:: not pertinent.                                                                 

                                                                                                  

                                                                                                  

ROS:                                                                                              

04:29 Constitutional: Negative for fever, chills, and weight loss, Eyes: Negative for injury, sheila 

      pain, redness, and discharge, ENT: Negative for injury, pain, and discharge, Neck:          

      Negative for injury, pain, and swelling, Cardiovascular: Negative for chest pain,           

      palpitations, and edema, Respiratory: Negative for shortness of breath, cough,              

      wheezing, and pleuritic chest pain, MS/Extremity: Negative for injury and deformity,        

      Skin: Negative for injury, rash, and discoloration, Neuro: Negative for headache,           

      weakness, numbness, tingling, and seizure, Psych: Negative for depression, anxiety,         

      suicide ideation, homicidal ideation, and hallucinations, Allergy/Immunology: Negative      

      for hives, rash, and allergies, Endocrine: Negative for neck swelling, polydipsia,          

      polyuria, polyphagia, and marked weight changes, Hematologic/Lymphatic: Negative for        

      swollen nodes, abnormal bleeding, and unusual bruising.                                     

04:29 Abdomen/GI: Positive for abdominal pain, of the posterior aspect of left lateral            

      abdomen, posterior aspect of right lateral abdomen, right lower quadrant and left lower     

      quadrant.                                                                                   

04:29 : Positive for small amounts, hematuria.                                                  

                                                                                                  

Exam:                                                                                             

04:29 Constitutional:  This is a well developed, well nourished patient who is awake, alert,  sheila 

      and in no acute distress. Head/Face:  Normocephalic, atraumatic. Eyes:  Pupils equal        

      round and reactive to light, extra-ocular motions intact.  Lids and lashes normal.          

      Conjunctiva and sclera are non-icteric and not injected.  Cornea within normal limits.      

      Periorbital areas with no swelling, redness, or edema. ENT:  Nares patent. No nasal         

      discharge, no septal abnormalities noted.  Tympanic membranes are normal and external       

      auditory canals are clear.  Oropharynx with no redness, swelling, or masses, exudates,      

      or evidence of obstruction, uvula midline.  Mucous membranes moist. Neck:  Trachea          

      midline, no thyromegaly or masses palpated, and no cervical lymphadenopathy.  Supple,       

      full range of motion without nuchal rigidity, or vertebral point tenderness.  No            

      Meningismus. Chest/axilla:  Normal chest wall appearance and motion.  Nontender with no     

      deformity.  No lesions are appreciated. Cardiovascular:  Regular rate and rhythm with a     

      normal S1 and S2.  No gallops, murmurs, or rubs.  Normal PMI, no JVD.  No pulse             

      deficits. Respiratory:  Lungs have equal breath sounds bilaterally, clear to                

      auscultation and percussion.  No rales, rhonchi or wheezes noted.  No increased work of     

      breathing, no retractions or nasal flaring. Male :  Normal genitalia with no              

      discharge or lesions. Skin:  Warm, dry with normal turgor.  Normal color with no            

      rashes, no lesions, and no evidence of cellulitis. MS/ Extremity:  Pulses equal, no         

      cyanosis.  Neurovascular intact.  Full, normal range of motion. Neuro:  Awake and           

      alert, GCS 15, oriented to person, place, time, and situation.  Cranial nerves II-XII       

      grossly intact.  Motor strength 5/5 in all extremities.  Sensory grossly intact.            

      Cerebellar exam normal.  Normal gait. Psych:  Awake, alert, with orientation to person,     

      place and time.  Behavior, mood, and affect are within normal limits.                       

04:29 Abdomen/GI: Inspection: distension, Bowel sounds: normal, Palpation: nontender, Liver:      

      no appreciated palpable abnormalities, Hernia: not appreciated.                             

04:29 Back: Exam negative for acute changes, CVA tenderness, decreased ROM, deformity,            

      ecchymosis injury, muscle spasm, pain at rest, painful ROM, scoliosis, vertebral            

      tenderness.                                                                                 

                                                                                                  

Vital Signs:                                                                                      

03:59  / 107; Pulse 95; Resp 30 S; Temp 98.6; Pulse Ox 100% on R/A; Weight 113.4 kg     bb  

      (R); Height 5 ft. 4 in. (162.56 cm) (R); Pain 10/10;                                        

06:00  / 92; Pulse 88; Resp 26; Pulse Ox 96% on R/A;                                    jb4 

06:34  / 115; Pulse 88; Resp 20; Pulse Ox 98% on R/A;                                   jb4 

09:40 Temp 98.1(O);                                                                           tr6 

03:59 Body Mass Index 42.91 (113.40 kg, 162.56 cm)                                            bb  

                                                                                                  

MDM:                                                                                              

04:20 Patient medically screened.                                                             sheila 

04:32 Differential diagnosis: UTI, nonspecific abdominal pain, UTI, urinary retention,        sheila 

      prostatitis. Data reviewed: vital signs, nurses notes, lab test result(s), radiologic       

      studies, CT scan. Data interpreted: Cardiac monitor: rate is 95 beats/min, rhythm is        

      regular, Pulse oximetry: on room air is 100 %. Test interpretation: by ED physician or      

      midlevel provider: ECG, plain radiologic studies. Counseling: I had a detailed              

      discussion with the patient and/or guardian regarding: the historical points, exam          

      findings, and any diagnostic results supporting the discharge/admit diagnosis, lab          

      results, radiology results.                                                                 

09:03 Physician consultation: Godfrey Gallardo MD and will see patient in office, dr Gallardo    sheila 

      very familiar with this patient, recommends Newton to gravity, abx , follow up in the        

      office for treatment.                                                                       

                                                                                                  

                                                                                             

04:22 Order name: Glucose, Ancillary Testing; Complete Time: 05:24                            EDMS

                                                                                             

04:23 Order name: Glucose, Ancillary Testing; Complete Time: 05:24                            EDMS

                                                                                             

04:28 Order name: Basic Metabolic Panel                                                       [facility] 

                                                                                             

04:28 Order name: CBC with Diff                                                               [facility] 

                                                                                             

04:28 Order name: Hepatic Function                                                            [facility] 

                                                                                             

04:28 Order name: Lipase                                                                      [facility] 

                                                                                             

04:28 Order name: Urine Culture                                                               [facility] 

                                                                                             

04:29 Order name: Basic Metabolic Panel; Complete Time: 05:24                                 EDMS

                                                                                             

04:29 Order name: CBC with Automated Diff; Complete Time: 05:24                               EDMS

                                                                                             

04:29 Order name: Liver (Hepatic) Function; Complete Time: 05:24                              EDMS

                                                                                             

04:29 Order name: Lipase; Complete Time: 05:24                                                EDMS

                                                                                             

04:29 Order name: Urine Culture                                                               EDMS

                                                                                             

04:51 Order name: Glucose, Ancillary Testing; Complete Time: 05:24                            EDMS

                                                                                             

04:52 Order name: Lactate; Complete Time: 05:24                                               EDMS

                                                                                             

04:28 Order name: IV Saline Lock; Complete Time: 04:46                                        [facility] 

                                                                                             

04:28 Order name: Labs collected and sent; Complete Time: 04:46                               [facility] 

                                                                                             

04:28 Order name: CT Stone Protocol                                                           [facility] 

                                                                                             

05:18 Order name: Urine Dipstick-Ancillary; Complete Time: 05:24                              EDMS

                                                                                             

06:35 Order name: Chest Single View XRAY; Complete Time: 08:06                                [facility] 

                                                                                             

08:58 Order name: Newton; Complete Time: 09:57                                                 [facility] 

                                                                                                  

Administered Medications:                                                                         

05:21 Drug: NS 0.9% 1000 ml Route: IV; Rate: 1 bolus; Site: right antecubital;                jb4 

05:21 Drug: Rocephin (cefTRIAXone) 1 grams Route: IV; Rate: per protocol; Site: right         jb4 

      antecubital;                                                                                

05:30 Follow up: Response: No adverse reaction; IV Status: Completed infusion                 jb4 

05:21 Drug: morphine 4 mg Route: IVP; Site: right antecubital;                                jb4 

05:35 Follow up: Response: No adverse reaction; Pain is decreased; RASS: Alert and Calm (0)   jb4 

05:21 Drug: Zofran (Ondansetron) 4 mg Route: IVP; Site: right antecubital;                    jb4 

05:34 Follow up: Response: No adverse reaction                                                jb4 

06:09 Drug: Cipro (ciprofloxacin) 500 mg Route: PO;                                           jb4 

06:47 Follow up: Response: No adverse reaction                                                jb4 

06:47 Drug: Bactrim (trimethoprim-sulfamethoxazole) (160 mg-800 mg (DS) 1 tablet Route: PO;   jb4 

09:57 Drug: Viscous Lidocaine Liquid (4 %) 5 ml Route: Mucous Membrane;                       tr6 

09:57 Drug: Dilaudid (HYDROmorphone) 1 mg Route: IVP; Site: right antecubital;                tr6 

09:57 Drug: Zofran (Ondansetron) 4 mg Route: IVP; Site: right antecubital;                    tr6 

                                                                                                  

                                                                                                  

Point of Care Testing:                                                                            

      Blood Glucose:                                                                              

04:11 Blood Glucose: 258 mg/dL;                                                               bb  

      Ranges:                                                                                     

      Critical Glucose Levels:Adult <50 mg/dl or >400 mg/dl  <40 mg/dl or >180 mg/dl       

Disposition Summary:                                                                              

21 09:07                                                                                    

Discharge Ordered                                                                                 

      Location: Home                                                                          sheila 

      Problem: new                                                                            sheila 

      Symptoms: have improved                                                                 sheila 

      Condition: Stable                                                                       sheila 

      Diagnosis                                                                                   

        - UTI/ Urinary tract infection, site not specified                                    sheila 

        - Unspecified kidney failure - chronic                                                sheila 

        - Type 1 diabetes mellitus with hyperglycemia                                         sheila 

        - Essential (primary) hypertension                                                    sheila 

        - Retention of urine, unspecified                                                     sheila 

      Followup:                                                                               sheila 

        - With: Private Physician                                                                  

        - When: 2 - 3 days                                                                         

        - Reason: Recheck today's complaints, Continuance of care, Re-evaluation by your           

      physician                                                                                   

      Followup:                                                                               sheila 

        - With:                                                                                    

        - When: 2 - 3 days                                                                         

        - Reason: Recheck today's complaints, Continuance of care, Re-evaluation by your           

      physician                                                                                   

      Discharge Instructions:                                                                     

        - Discharge Summary Sheet                                                             sheila 

        - Type 1 Diabetes Mellitus, Diagnosis, Adult                                          sheila 

        - Dysuria                                                                             sheila 

        - Hyperglycemia                                                                       sheila 

        - Urinary Tract Infection, Adult                                                      sheila 

        - Hypertension, Adult                                                                 sheila 

        - Urinary Tract Infection, Adult, Easy-to-Read                                        sheila 

        - Hypertension, Adult, Easy-to-Read                                                   sheila 

        - Indwelling Urinary Catheter Insertion                                               sheila 

        - Indwelling Urinary Catheter Insertion, Care After                                   sheila 

      Forms:                                                                                      

        - Medication Reconciliation Form                                                      sheila 

        - Thank You Letter                                                                    sheila 

        - Antibiotic Education                                                                sheila 

        - Prescription Opioid Use                                                             sheila 

      Prescriptions:                                                                              

        - Cipro 250 mg Oral Tablet                                                                 

            - take 1 tablet by ORAL route every 12 hours; 20 tablet; Refills: 0, Product      [facility] 

      Selection Permitted                                                                         

        - Bactrim -160 mg Oral Tablet                                                        

            - take 1 tablet by ORAL route every 12 hours for 10 days; 20 tablet; Refills: 0,  [facility] 

      Product Selection Permitted                                                                 

        - tamsulosin 0.4 mg Oral capsule                                                           

            - take 1 capsule by ORAL route once daily 1/2 hour following the same meal each   [facility] 

      day; 14 capsule; Refills: 0, Product Selection Permitted                                    

        - acetaminophen-codeine 300-15 mg Oral tablet                                              

            - take 2 tablet by ORAL route every 4-6 hours; 20 tablet; Refills: 0, Product     [facility] 

      Selection Permitted                                                                         

Signatures:                                                                                       

Dispatcher [employer]                           EDTha Solis MD MD cha Ballard, Brenda RN                     RN   Scott Handley RN                       RN   jb4                                                  

Liliana Anthony RN                   RN   tr6                                                  

                                                                                                  

Corrections: (The following items were deleted from the chart)                                    

04:06 04:03 PMHx: Diabetes - NIDDM; sandrita remy  

                                                                                                  

**************************************************************************************************

## 2021-08-03 NOTE — RAD REPORT
EXAM DESCRIPTION:  RAD - Chest Single View - 8/3/2021 7:22 am

 

CLINICAL HISTORY:  COUGH

 

COMPARISON:  Chest Single View dated 6/14/2021; Chest Single View dated 4/28/2021; Chest Single View 
dated 4/20/2021

 

FINDINGS:  Low lung volumes accentuates the pulmonary vasculature. Cardiomegaly.No acute osseous abno
rmality. No significant pleural effusions or pneumothorax.

 

IMPRESSION:  Low lung volumes accentuate the pulmonary vasculature. No consolidative airspace disease
 or clear evidence of edema identified.

## 2021-08-03 NOTE — RAD REPORT
EXAM DESCRIPTION:  CT - Stone Protocol - 8/3/2021 7:14 am

 

CLINICAL HISTORY:  Flank pain;Hematuria

 

COMPARISON:  6/9/2021

 

TECHNIQUE:  CT of the abdomen and pelvis without IV contrast. Evaluation of the solid organs and vasc
ulature is suboptimal due to lack of IV contrast. This exam was performed according to our department
al dose-optimization program, which includes automated exposure control, adjustment of the mA and/or 
kV according to patient size and/or use of iterative reconstruction technique.

 

FINDINGS:  Lung Bases: Minimal dependent atelectasis.

Bones: Multilevel endplate spondylosis. Stable compression deformities of T11 and T12.

Abdomen:

Liver: The liver has normal size and density.

Gallbladder: No calcified gallstones.

Spleen, Pancreas, and Adrenal Glands:   The spleen, pancreas, and adrenal glands are unremarkable.

Kidneys: Severe bilateral hydronephrosis with bilateral ureteral stents in place.

Vasculature: The aorta and IVC have normal caliber and position.

Stomach:   The stomach and duodenum have normal course.

Other:   No free intraperitoneal air.   Enlarged perirectal and pelvic lymph nodes again identified.

Pelvis:

Bladder:   Wall thickening of the urinary bladder.

Bowel:   No dilated loops of large or small bowel.

Appendix:   Normal appendix.

Pelvis: Small bilateral fat-containing hernias.

 

IMPRESSION:  1.   Severe bilateral hydronephrosis with bilateral ureteral stents in place. This may i
ndicate stent dysfunction.

2.   Wall thickening of the urinary bladder. This could be seen with cystitis.

3.   Enlarged perirectal and pelvic lymph nodes again identified.

 

Electronically signed by:   Jin Aguilar   8/3/2021 6:43 AM CDT Workstation: [id number]

 

 

Due to temporary technical issues with the PACS/Fluency reporting system, reports are being signed by
 the in house radiologist without review as a courtesy to ensure prompt reporting. The interpreting r
adiologist is fully responsible for the content of the report.

## 2021-08-03 NOTE — ER
Nurse's Notes                                                                                     

 [facility]                                                                 

Name: Julio Cesar Reeves                                                                                

Age: 40 yrs                                                                                       

Sex: Male                                                                                         

: 1980                                                                                   

MRN: [id number]                                                                                   

Arrival Date: 2021                                                                          

Time: 03:33                                                                                       

Account#: [id number]                                                                            

Bed 8                                                                                             

Private MD:                                                                                       

Diagnosis: UTI/ Urinary tract infection, site not specified;Unspecified kidney                    

  failure-chronic;Type 1 diabetes mellitus with hyperglycemia;Essential (primary)                 

  hypertension;Retention of urine, unspecified                                                    

                                                                                                  

Presentation:                                                                                     

                                                                                             

03:59 Chief complaint: Patient states: he has hx of kidney infections and now has low back    bb  

      pain, bladder pain, and is urinating blood. Coronavirus screen: At this time, the           

      client does not indicate any symptoms associated with coronavirus-19. Ebola Screen: No      

      symptoms or risks identified at this time. Initial Sepsis Screen: Does the patient meet     

      any 2 criteria? RR > 20 per min. HR > 90 bpm. Yes Does the patient have a suspected         

      source of infection? Yes: Dysuria/Frequency/Urgency/UTI. Risk Assessment: Do you want       

      to hurt yourself or someone else? Patient reports no desire to harm self or others.         

      Onset of symptoms was 2021.                                                       

03:59 Method Of Arrival: Ambulatory                                                           bb  

03:59 Acuity: KAREEM 2                                                                           bb  

                                                                                                  

Historical:                                                                                       

- Allergies:                                                                                      

04:03 No Known Allergies;                                                                     bb  

- Home Meds:                                                                                      

04:03 gabapentin oral [Active]; Oxybutynin Chloride Oral [Active]; Metoprolol Tartrate Oral   bb  

      [Active]; Hydrochlorothiazide Oral [Active]; tamsulosin oral [Active]; Insulin: Regular     

      Sub-Q [Active]; Novolin 70/30 [place] Sub-Q [Active];                                       

- PMHx:                                                                                           

04:03 Hypertension; kidney infection; IDDM;                                                   bb  

- PSHx:                                                                                           

04:03 kidney stents;                                                                          bb  

                                                                                                  

- Immunization history:: Adult Immunizations up to date, Client reports having NOT                

  received the Covid vaccine.                                                                     

- Social history:: Smoking status: Patient reports the use of cigarette tobacco                   

  products, smokes one pack cigarettes per day.                                                   

- Family history:: not pertinent.                                                                 

                                                                                                  

                                                                                                  

Screenin:40 Abuse screen: Denies threats or abuse. Denies injuries from another. Nutritional        tr6 

      screening: No deficits noted. Tuberculosis screening: No symptoms or risk factors           

      identified. Fall Risk None identified.                                                      

                                                                                                  

Assessment:                                                                                       

04:15 General: Appears in no apparent distress. uncomfortable, Behavior is calm, cooperative, jb4 

      appropriate for age. Pain: Complains of pain in low back area Pain does not radiate.        

      Pain currently is 10 out of 10 on a pain scale. Quality of pain is described as sharp.      

      Neuro: Level of Consciousness is awake, alert, obeys commands, Oriented to person,          

      place, time, situation. Cardiovascular: Patient's skin is warm and dry. Respiratory:        

      Airway is patent Respiratory effort is labored, Respiratory pattern is symmetrical,         

      tachypnea. GI: No signs and/or symptoms were reported involving the gastrointestinal        

      system. : Reports discharge, bloody, pain with urination, urgency, urinary frequency.     

      EENT: No signs and/or symptoms were reported regarding the EENT system. Derm: Skin is       

      intact, Skin is pink, warm \T\ dry. Musculoskeletal: Circulation, motion, and sensation     

      intact. Range of motion: intact in all extremities.                                         

05:15 Reassessment: Patient appears in no apparent distress at this time. No changes from     jb4 

      previously documented assessment. Patient and/or family updated on plan of care and         

      expected duration. Pain level reassessed.                                                   

06:13 Reassessment: Patient appears in no apparent distress at this time. Patient and/or      jb4 

      family updated on plan of care and expected duration. Pain level reassessed. Pt             

      continues to report pain, reports it is more tolerable now. Respirations continue to be     

      tachypneic and labored. Patient states feeling better.                                      

06:33 Reassessment: Patient appears in no apparent distress at this time. Patient and/or      jb4 

      family updated on plan of care and expected duration. Pain level reassessed. Patient is     

      alert, oriented x 3, equal unlabored respirations, skin warm/dry/pink. Pt states the        

      pain in his lower back has gone, but still has some pain in his bladder. Patient states     

      symptoms have improved.                                                                     

                                                                                                  

Vital Signs:                                                                                      

03:59  / 107; Pulse 95; Resp 30 S; Temp 98.6; Pulse Ox 100% on R/A; Weight 113.4 kg     bb  

      (R); Height 5 ft. 4 in. (162.56 cm) (R); Pain 10/10;                                        

06:00  / 92; Pulse 88; Resp 26; Pulse Ox 96% on R/A;                                    jb4 

06:34  / 115; Pulse 88; Resp 20; Pulse Ox 98% on R/A;                                   jb4 

09:40 Temp 98.1(O);                                                                           tr6 

03:59 Body Mass Index 42.91 (113.40 kg, 162.56 cm)                                              

                                                                                                  

ED Course:                                                                                        

03:33 Patient arrived in ED.                                                                  am2 

04:03 Triage completed.                                                                       bb  

04:03 Arm band placed on right wrist. Patient placed in an exam room, on a stretcher, on      bb  

      pulse oximetry. Family accompanied patient.                                                 

04:20 Tha Edwards MD is Attending Physician.                                             [facility] 

04:20 Scott Dennison, RN is Primary Nurse.                                                     jb4 

05:28 CT Stone Protocol In Process Unspecified.                                               EDMS

07:21 Chest Single View XRAY In Process Unspecified.                                          EDMS

09:06 Godfrey Gallardo MD is Referral Physician.                                              sheila 

09:40 Patient has correct armband on for positive identification. Bed in low position. Call   tr6 

      light in reach.                                                                             

09:40 No provider procedures requiring assistance completed. IV is patent.                    tr6 

09:57 Newton cath inserted, using sterile technique, 16 Fr., by me, balloon inflated.          tr6 

10:31 IV discontinued, intact, bleeding controlled, No redness/swelling at site. Pressure     tr6 

      dressing applied.                                                                           

                                                                                                  

Administered Medications:                                                                         

05:21 Drug: NS 0.9% 1000 ml Route: IV; Rate: 1 bolus; Site: right antecubital;                jb4 

05:21 Drug: Rocephin (cefTRIAXone) 1 grams Route: IV; Rate: per protocol; Site: right         jb4 

      antecubital;                                                                                

05:30 Follow up: Response: No adverse reaction; IV Status: Completed infusion                 jb4 

05:21 Drug: morphine 4 mg Route: IVP; Site: right antecubital;                                jb4 

05:35 Follow up: Response: No adverse reaction; Pain is decreased; RASS: Alert and Calm (0)   jb4 

05:21 Drug: Zofran (Ondansetron) 4 mg Route: IVP; Site: right antecubital;                    jb4 

05:34 Follow up: Response: No adverse reaction                                                jb4 

06:09 Drug: Cipro (ciprofloxacin) 500 mg Route: PO;                                           jb4 

06:47 Follow up: Response: No adverse reaction                                                jb4 

06:47 Drug: Bactrim (trimethoprim-sulfamethoxazole) (160 mg-800 mg (DS) 1 tablet Route: PO;   jb4 

09:57 Drug: Viscous Lidocaine Liquid (4 %) 5 ml Route: Mucous Membrane;                       tr6 

09:57 Drug: Dilaudid (HYDROmorphone) 1 mg Route: IVP; Site: right antecubital;                tr6 

09:57 Drug: Zofran (Ondansetron) 4 mg Route: IVP; Site: right antecubital;                    tr6 

                                                                                                  

                                                                                                  

Point of Care Testing:                                                                            

      Blood Glucose:                                                                              

04:11 Blood Glucose: 258 mg/dL;                                                               bb  

      Ranges:                                                                                     

                                                                                                  

Output:                                                                                           

10:30 Urine: 600ml (Newton); Total: 600ml.                                                     tr6 

                                                                                                  

Outcome:                                                                                          

09:07 Discharge ordered by MD. sosa 

10:31 Discharged to home ambulatory, pt refused wheelchair at this time                       tr6 

10:31 Condition: improved                                                                         

10:31 Discharge instructions given to patient, family, Instructed on discharge instructions,      

      follow up and referral plans. medication usage, safety practices, Demonstrated              

      understanding of instructions, follow-up care, medications, Prescriptions given X 4.        

10:51 Patient left the ED.                                                                    5 

                                                                                                  

Signatures:                                                                                       

Dispatcher [employer]                           EDMS                                                 

Tha Edwards MD MD cha Ballard, Brenda, RODERICK                     RN   Scott Handley RN                       RN   Daya Cronin                              5                                                  

Jessica Larson Tiffany, RN                   RN   tr6                                                  

                                                                                                  

Corrections: (The following items were deleted from the chart)                                    

04:06 04:03 PMHx: Diabetes - NIDDM; sandrita remy  

06:27 05:45 Reassessment: PT removed own C-collar. Ct results pending. hans                    jb 

06:27 06:24 Reassessment: Pt given gatorade per providers request. Anitra                        [facility] 

06:34 06:33 Reassessment: Patient appears in no apparent distress at this time. Patient       jb4 

      and/or family updated on plan of care and expected duration. Pain level reassessed.         

      Patient is alert, oriented x 3, equal unlabored respirations, skin warm/dry/pink.           

      Patient states symptoms have improved. jb4                                                  

                                                                                                  

**************************************************************************************************

## 2021-08-04 NOTE — P.PN
Date of Service: 08/03/21


This is designed to communicate and summarize the outpatient events relative to 

the care of Mr. Reeves for emergency department and hospitalist physicians and 

APPs for what will likely be recurrent future emergency visits.


Of note: I was contacted by Dr. Edwards from the ED 8/3/21 while away on 

vacation after Mr. Reeves came into the ED once again. Per our discussion, he 

was non-toxic, his renal function was near his baseline, the stents were in good

position but the bladder was severely thickened and there was hydronephrosis. 


Recommendation: replace the urethral Newton catheter.





40-year-old gentleman last seen by me, 5/13/21, in my clinic with h/o acute on 

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 

diabetes with acute on chronic kidney injury hospitalized for complications of 

uremia with complicated UTI and bilateral hydronephrosis with MAG3 Lasix 

renogram confirmed persistent obstruction despite Newton catheter decompression p

operative evaluation 4/26/2021, where a cystoscopy, bilateral retrograde 

pyelographies, and bilateral ureteral stents were placed for likely 

ureterovesical junction obstruction and an elevated bladder neck potentially 

consistent with primary bladder neck dysfunction, now, after initially 

successful voiding trial, with recurrent large-volume urinary retention.


-I counseled the patient extensively, over 35-45 minutes explaining to him the 

potential damage that he will do to his kidneys associated with his failure to 

empty his bladder and the reflux of the urine and back up into the kidney that 

will occur. I explained that this could cause significant kidney failure which 

could send him over into dialysis need and increases risk of death. I strongly 

recommended either we place a Newton catheter or we could teach him how to 

perform CIC to regularly empty his bladder. I tried to reassure him that this 

was a temporary solution until we could complete the evaluation and figure out 

why he was unable to void. I left him for some time to think about his options, 

and when I return, he seems somewhat frustrated and suggested he refused to have

a catheter or to catheterize himself and if he were to die from this, "he was 

ready to die". I explained that he was likely just frustrated at this time and 

overreacting a bit to the circumstance. I tried to calm him down and explained 

the benefit to managing the obstruction suggesting that while I initially wanted

him to get an ultrasound, as long as he was catheterized or catheterizing, we 

could avoid getting the ultrasound and instead proceed directly to the 

urodynamic evaluation. He still refused catheterization. 





I thus asked him to agree to expediting the following:


-Urodynamic evaluation at [place]


-Virtual visit follow-up in about 2 weeks


-I counseled that if he changes his mind about either having a catheter, which 

could be capped and intermittently drained, or intermittently catheterizing, he 

should let us know and we could manage that for him even tomorrow





Since that time, [facility] practice in [place] has contracted for 

urodynamic services to be performed in our offices. Unfortunately, he did not do

the virtual visit follow up, and he never scheduled the urodynamic evaluation to

decipher his underlying voiding dysfunction, which is causing his hydronephrosis

and CKI. 


My office staff attempted to follow up with him on several occasions on 6/2 and 

6/3/21, and each time, he hung up in their faces. As a result, I recommended we 

remove the ureteral stents to minimize the risk of future ascending infection 

and encrustation. Because he expressed cost and ability to pay for the stents to

be removed to be an issue, I even reached out to clinic management for approval 

to provide the service/procedure at no charge. They approved the no charge stent

removals, but then the patient suggested he only wanted to do it under 

anesthesia. Attempts to contact the patient to arrange hospital day surgery 

procedure +/- Medicaid approval by my staff were met with belligerence and the 

patient again hanging up in their faces. 





In the interim, he returned to the ER again with signs of acute 

infection/suspected sepsis, and he was again placed on Meropenem therapy for 

presumed ESBL infection. Because he was acutely infected, I explained he would 

require at least 2-3 days of antimicrobial therapy before the only urologic 

intervention I would recommend as an inpatient beyond placing a Newton catheter 

could take place, removal of the ureteral stents. Because I was away on 

vacation, I was unable to accommodate the inpatient stent removal under 

anesthesia, and so he was discharged with recommendations to follow up with me 

in ~2 weeks for the urethral Newton catheter and the stents to be removed.





On 6/23/21, his wife contacted my office about having the urethral Newton 

catheter removed. She spoke to MIKE Diamond, who informed her that his situation 

was more complex and merited more than simply a visit to take out the catheter; 

so his wife hung up on NP Dara. Per hospital records, he then apparently 

came to the ER requesting to have the catheter removed.





8/3/21, he returned to the ER with complaints of pain and bilateral 

hydronephrosis despite the stents in place.





My office will now send him a certified letter informing him again of the 

imperative that the stents be removed within 6 months of their placement, 

4/26/21, and if he will not complete the evaluation and treatment as 

recommended, we will remove them free of charge in the office. If he fails to 

respond, he assumes all the risks associated with retained ureteral stents, and 

he must seek all future care with another urologist.





My recommendations remain the following:


- maintain the urethral Newton catheter or initiate CIC


- schedule and complete urodynamic evaluation (may be with me or any other 

urologist of the patient's choosing). 


This is necessary to preserve his renal function and to decipher to etiology and

prognosis of his voiding dysfunction. It is necessary to determine the role, if 

any, for surgical therapy.


- if he continues to refuse the above, it is imperative that the ureteral stents

be removed to prevent future complication. The upper tract obstruction is likely

to recur, especially if he continues to refuse catheterization and evaluation as

recommended above, but we have no other good option in a belligerent and wholly 

non-compliant patient.

## 2021-08-27 ENCOUNTER — HOSPITAL ENCOUNTER (EMERGENCY)
Dept: HOSPITAL 97 - ER | Age: 41
Discharge: HOME | End: 2021-08-27
Payer: SELF-PAY

## 2021-08-27 VITALS — TEMPERATURE: 98.4 F | DIASTOLIC BLOOD PRESSURE: 99 MMHG | SYSTOLIC BLOOD PRESSURE: 144 MMHG

## 2021-08-27 VITALS — OXYGEN SATURATION: 100 %

## 2021-08-27 DIAGNOSIS — I10: ICD-10-CM

## 2021-08-27 DIAGNOSIS — N39.0: Primary | ICD-10-CM

## 2021-08-27 DIAGNOSIS — F17.210: ICD-10-CM

## 2021-08-27 LAB
BUN BLD-MCNC: 33 MG/DL (ref 7–18)
GLUCOSE SERPLBLD-MCNC: 164 MG/DL (ref 74–106)
HCT VFR BLD CALC: 41.3 % (ref 39.6–49)
LYMPHOCYTES # SPEC AUTO: 1.7 K/UL (ref 0.7–4.9)
PMV BLD: 8.2 FL (ref 7.6–11.3)
POTASSIUM SERPL-SCNC: 3.9 MMOL/L (ref 3.5–5.1)
RBC # BLD: 4.71 M/UL (ref 4.33–5.43)

## 2021-08-27 PROCEDURE — 96374 THER/PROPH/DIAG INJ IV PUSH: CPT

## 2021-08-27 PROCEDURE — 96375 TX/PRO/DX INJ NEW DRUG ADDON: CPT

## 2021-08-27 PROCEDURE — 87088 URINE BACTERIA CULTURE: CPT

## 2021-08-27 PROCEDURE — 81003 URINALYSIS AUTO W/O SCOPE: CPT

## 2021-08-27 PROCEDURE — 80048 BASIC METABOLIC PNL TOTAL CA: CPT

## 2021-08-27 PROCEDURE — 87086 URINE CULTURE/COLONY COUNT: CPT

## 2021-08-27 PROCEDURE — 36415 COLL VENOUS BLD VENIPUNCTURE: CPT

## 2021-08-27 PROCEDURE — 99284 EMERGENCY DEPT VISIT MOD MDM: CPT

## 2021-08-27 PROCEDURE — 85025 COMPLETE CBC W/AUTO DIFF WBC: CPT

## 2021-08-27 PROCEDURE — 81015 MICROSCOPIC EXAM OF URINE: CPT

## 2021-08-27 NOTE — EDPHYS
Physician Documentation                                                                           

 [facility]                                                                 

Name: Julio Cesar Reeves                                                                                

Age: 40 yrs                                                                                       

Sex: Male                                                                                         

: 1980                                                                                   

MRN: [id number]                                                                                   

Arrival Date: 2021                                                                          

Time: 01:25                                                                                       

Account#: [id number]                                                                            

Bed 24                                                                                            

Private MD:                                                                                       

ED Physician Tima Little                                                                         

HPI:                                                                                              

                                                                                             

02:50 This 40 yrs old  Male presents to ER via Wheelchair with complaints of Urinary  rn  

      problems, low Back Pain.                                                                    

02:50 The patient presents with urinary symptoms, dysuria, urinary frequency.                 rn  

02:50 Onset: The symptoms/episode began/occurred 2 day(s) ago. Modifying factors: The         rn  

      symptoms are alleviated by nothing, the symptoms are aggravated by urinating.               

      Associated signs and symptoms: Pertinent positives: dysuria, Pertinent negatives:           

      fever. Severity of symptoms: At their worst the symptoms were moderate, in the              

      emergency department the symptoms are unchanged. The patient has experienced similar        

      episodes in the past. The patient has been recently seen by a physician:. Patient           

      reports low back pain and dysuria for 2 days. Reports difficulty urinating and              

      increased frequency. Had stents removed 2 weeks ago by his urologist. States currently      

      on antibiotics. No trauma. No hematuria. No fever. Reports recurrent urinary                

      infections..                                                                                

                                                                                                  

Historical:                                                                                       

- Allergies:                                                                                      

01:58 No Known Allergies;                                                                     em  

- PMHx:                                                                                           

01:58 Hypertension; IDDM; kidney infection;                                                   em  

- PSHx:                                                                                           

01:58 kidney stents;                                                                          em  

                                                                                                  

- Immunization history:: Client reports having NOT received the Covid vaccine.                    

- Social history:: Smoking status: Patient reports the use of cigarette tobacco                   

  products, smokes one pack cigarettes per day.                                                   

- Family history:: not pertinent.                                                                 

- Hospitalizations: : No recent hospitalization is reported.                                      

- Code Status:: Full code.                                                                        

                                                                                                  

                                                                                                  

ROS:                                                                                              

02:50 Constitutional: Negative for fever, chills, and weight loss, Eyes: Negative for injury, rn  

      pain, redness, and discharge, Neck: Negative for injury, pain, and swelling,                

      Cardiovascular: Negative for chest pain, palpitations, and edema, Respiratory: Negative     

      for shortness of breath, cough, wheezing, and pleuritic chest pain, Abdomen/GI:             

      Negative for nausea, vomiting, diarrhea, and constipation, Back: Negative for injury        

      : Positive for dysuria and increased frequency MS/Extremity: Negative for injury and      

      deformity, Skin: Negative for injury, rash, and discoloration, Neuro: Negative for          

      headache, weakness, numbness, tingling, and seizure.                                        

02:50 All other systems are negative.                                                             

                                                                                                  

Exam:                                                                                             

02:50 Constitutional:  This is a well developed, well nourished patient who is awake, alert,  rn  

      and in no acute distress.  Patient standing and currently urinating into urinal on his      

      own power. Head/Face:  Normocephalic, atraumatic. Eyes:  Periorbital areas with no          

      swelling, redness, or edema. Cardiovascular:  Regular rate and rhythm.  No pulse            

      deficits. Respiratory:  No increased work of breathing, no retractions or nasal             

      flaring. Abdomen/GI:  Soft, mild suprapubic tenderness, no rebound or guarding Back:        

      No spinal tenderness.  No costovertebral tenderness.  Full range of motion. Skin:           

      Warm, dry  MS/ Extremity:  Pulses equal, no cyanosis.  Neuro:  Awake and alert, GCS 15,     

      ambulatory with normal strength.                                                            

                                                                                                  

Vital Signs:                                                                                      

01:56  / 99; Pulse 90; Resp 18; Temp 97.2; Pulse Ox 99% on R/A; Weight 117.03 kg;       em  

      Height 5 ft. 6 in. (167.64 cm); Pain 10/10;                                                 

03:08  / 87; Pulse 86; Resp 92; Temp 97.4; Pulse Ox 100% on R/A; Pain 10/10;            wg  

03:22  / 98; Pulse 90; Resp 19; Temp 98.6; Pain 10/10;                                  mg4 

04:14  / 99; Pulse 90; Resp 18; Temp 98.4; Pulse Ox 100% ; Pain 5/10;                   mg4 

01:56 Body Mass Index 41.64 (117.03 kg, 167.64 cm)                                            em  

                                                                                                  

MDM:                                                                                              

02:00 Patient medically screened.                                                             rn  

04:06 Differential diagnosis: UTI. ED course: [place] score 90/60/000, will prescribe small rn  

      amount of pain medication..                                                                 

04:07 Data reviewed: vital signs, nurses notes, old medical records, lab test result(s), and  rn  

      as a result, I will discharge patient. Data interpreted: Cardiac monitor: rate is 90        

      beats/min, rhythm is normal sinus rhythm, regular, with no ectopy, Interpretation:          

      normal rate, normal rhythm, Pulse oximetry: on room air is 100 %. Interpretation:           

      normal. Counseling: I had a detailed discussion with the patient and/or guardian            

      regarding: the historical points, exam findings, and any diagnostic results supporting      

      the discharge/admit diagnosis, lab results, radiology results, the need for outpatient      

      follow up, to return to the emergency department if symptoms worsen or persist or if        

      there are any questions or concerns that arise at home. Response to treatment: the          

      patient's symptoms have markedly improved after treatment, and as a result, I will          

      discharge patient. Special discussion: I discussed with the patient/guardian in detail      

      that at this point there is no indication for admission to the hospital. It is              

      understood, however, that if the symptoms persist or worsen the patient needs to return     

      immediately for re-evaluation. Based on the history and exam findings, there is no          

      indication for further emergent testing or inpatient evaluation. I discussed with the       

      patient/guardian the need to see the urologist for further evaluation of the symptoms.      

      ED course: Will switch patient's antibiotic to Vantin from Cipro. Urine shows urine         

      infection. Bladder scan shows empty bladder so not in retention. Will DC home with          

      urology follow-up..                                                                         

                                                                                                  

                                                                                             

02:01 Order name: Urine Culture                                                               rn  

                                                                                             

02:01 Order name: Urine Microscopic Only                                                      rn  

                                                                                             

02:02 Order name: Urine Culture                                                               [facility]

                                                                                             

02:13 Order name: CBC with Diff; Complete Time: 04:07                                         rn  

                                                                                             

02:13 Order name: Basic Metabolic Panel; Complete Time: 04:07                                 rn  

                                                                                             

03:15 Order name: Urine Dipstick-Ancillary; Complete Time: 03:17                              [facility]

                                                                                             

02:01 Order name: Urine Dipstick-Ancillary (obtain specimen); Complete Time: 03:16            rn  

                                                                                             

02:13 Order name: Bladder Scanner; Complete Time: 03:05                                       rn  

                                                                                             

02:13 Order name: IV Start; Complete Time: 03:05                                              rn  

                                                                                                  

Administered Medications:                                                                         

03:17 Drug: Demerol (meperidine) 25 mg Route: IVP; Infused Over: 5 mins; Site: left forearm;  mg4 

04:25 Follow up: Response: No adverse reaction; Pain is decreased                             wg  

04:16 Drug: Rocephin (cefTRIAXone) 1 grams Route: IV; Rate: calculated rate; Site: left       wg  

      forearm;                                                                                    

04:27 Follow up: IV Status: Completed infusion                                                wg  

                                                                                                  

                                                                                                  

Disposition Summary:                                                                              

21 04:09                                                                                    

Discharge Ordered                                                                                 

      Location: Home                                                                          rn  

      Problem: new                                                                            rn  

      Symptoms: have improved                                                                 rn  

      Condition: Stable                                                                       rn  

      Diagnosis                                                                                   

        - UTI/ Urinary tract infection, site not specified                                    rn  

      Followup:                                                                               rn  

        - With: Private Physician                                                                  

        - When: As needed                                                                          

        - Reason: Recheck today's complaints, Re-evaluation by your physician                      

      Discharge Instructions:                                                                     

        - Discharge Summary Sheet                                                             rn  

        - Urinary Tract Infection, Adult                                                      rn  

      Forms:                                                                                      

        - Medication Reconciliation Form                                                      rn  

        - Thank You Letter                                                                    rn  

        - Antibiotic Education                                                                rn  

        - Prescription Opioid Use                                                             rn  

      Prescriptions:                                                                              

        - Tramadol 50 mg Oral Tablet                                                               

            - take 1 tablet by ORAL route every 8 hours as needed; 12 tablet; Refills: 0,     rn  

      Product Selection Permitted                                                                 

        - cefpodoxime 100 mg Oral Tablet                                                           

            - take 2 tablets by ORAL route every 12 hours for 10 days take with food; 40      rn  

      tablet; Refills: 0, Product Selection Permitted                                             

Signatures:                                                                                       

Dispatcher [employer]                           Stevie Celis, RN                        RN   Tima Rosen MD MD rn Garcia, [place]                               mg4                                                  

Lew Estrada                                                                                 

                                                                                                  

**************************************************************************************************

## 2021-08-27 NOTE — ER
Nurse's Notes                                                                                     

 [facility]                                                                 

Name: Julio Cesar Reeves                                                                                

Age: 40 yrs                                                                                       

Sex: Male                                                                                         

: 1980                                                                                   

MRN: [id number]                                                                                   

Arrival Date: 2021                                                                          

Time: 01:25                                                                                       

Account#: [id number]                                                                            

Bed 24                                                                                            

Private MD:                                                                                       

Diagnosis: UTI/ Urinary tract infection, site not specified                                       

                                                                                                  

Presentation:                                                                                     

                                                                                             

01:56 Chief complaint: Patient states: low back pain x 2 days, hx of UTI's, had kidney stents em  

      removed 2 days ago, denies fever. Coronavirus screen: Client denies travel out of the       

      [place] in the last 14 days. Ebola Screen: Patient negative for fever greater than or          

      equal to 101.5 degrees Fahrenheit, and additional compatible Ebola Virus Disease            

      symptoms Patient denies exposure to infectious person. Patient denies travel to an          

      Ebola-affected area in the 21 days before illness onset. No symptoms or risks               

      identified at this time. Initial Sepsis Screen: Does the patient meet any 2 criteria?       

      HR > 90 bpm. Does the patient have a suspected source of infection? Yes:                    

      Dysuria/Frequency/Urgency/UTI. Risk Assessment: Do you want to hurt yourself or someone     

      else? Patient reports no desire to harm self or others. Onset of symptoms was 2021.                                                                                   

01:56 Method Of Arrival: Wheelchair                                                           em  

01:56 Acuity: KAREEM 3                                                                           em  

                                                                                                  

Triage Assessment:                                                                                

04:25 General: Behavior is calm, cooperative.                                                 wg  

                                                                                                  

Historical:                                                                                       

- Allergies:                                                                                      

01:58 No Known Allergies;                                                                     em  

- PMHx:                                                                                           

01:58 Hypertension; IDDM; kidney infection;                                                   em  

- PSHx:                                                                                           

01:58 kidney stents;                                                                          em  

                                                                                                  

- Immunization history:: Client reports having NOT received the Covid vaccine.                    

- Social history:: Smoking status: Patient reports the use of cigarette tobacco                   

  products, smokes one pack cigarettes per day.                                                   

- Family history:: not pertinent.                                                                 

- Hospitalizations: : No recent hospitalization is reported.                                      

- Code Status:: Full code.                                                                        

                                                                                                  

                                                                                                  

Screenin:06 Abuse screen: Denies threats or abuse. Denies injuries from another. Nutritional        wg  

      screening: No deficits noted. Tuberculosis screening: No symptoms or risk factors           

      identified. Fall Risk None identified.                                                      

                                                                                                  

Assessment:                                                                                       

03:06 Reassessment: Patient appears in no apparent distress at this time. General: Appears    wg  

      uncomfortable. Pain: Complains of pain in abdomen. Neuro: No deficits noted.                

      Cardiovascular: No deficits noted. Respiratory: No deficits noted. GI: No deficits          

      noted. : Reports pain in suprapubic area. EENT: No deficits noted. Derm: No deficits      

      noted. Musculoskeletal: No deficits noted.                                                  

03:19 General: Reports. Pain: Complains of pain in abdomen, back, and bladder. Neuro: No      mg4 

      deficits noted. Cardiovascular: No deficits noted. Respiratory: No deficits noted. GI:      

      No deficits noted. : pt states he has pain in his back and bladder area. EENT: No         

      deficits noted.                                                                             

04:15 Reassessment: No changes from previously documented assessment. Pain: Complains of pain mg4 

      in back, abdomen, and bladder. Neuro: No deficits noted. Cardiovascular: No deficits        

      noted. Respiratory: No deficits noted. GI: No deficits noted.                               

                                                                                                  

Vital Signs:                                                                                      

01:56  / 99; Pulse 90; Resp 18; Temp 97.2; Pulse Ox 99% on R/A; Weight 117.03 kg;       em  

      Height 5 ft. 6 in. (167.64 cm); Pain 10/10;                                                 

03:08  / 87; Pulse 86; Resp 92; Temp 97.4; Pulse Ox 100% on R/A; Pain 10/10;            wg  

03:22  / 98; Pulse 90; Resp 19; Temp 98.6; Pain 10/10;                                  mg4 

04:14  / 99; Pulse 90; Resp 18; Temp 98.4; Pulse Ox 100% ; Pain 5/10;                   mg4 

01:56 Body Mass Index 41.64 (117.03 kg, 167.64 cm)                                            em  

                                                                                                  

ED Course:                                                                                        

01:25 Patient arrived in ED.                                                                  bp1 

01:58 Triage completed.                                                                       em  

01:58 Arm band placed on.                                                                     em  

02:00 Tima Little MD is Attending Physician.                                                rn  

02:43 Mare Jack is Primary Nurse.                                                        mg4 

03:04 Basic Metabolic Panel Sent.                                                             wg  

03:05 No apparent distress.                                                                   wg  

03:05 CBC with Diff Sent.                                                                     wg  

03:05 Urine Culture Sent.                                                                     wg  

03:05 Urine Culture Sent.                                                                     wg  

03:05 Urine Microscopic Only Sent.                                                            wg  

03:05 No provider procedures requiring assistance completed. Urine collected:. Inserted       wg  

      saline lock: 20 gauge in left forearm, using aseptic technique. Patient maintains SpO2      

      saturation greater than 95% on room air.                                                    

03:06 Patient has correct armband on for positive identification.                             wg  

04:24 IV discontinued, intact, bleeding controlled, No redness/swelling at site. Pressure     wg  

      dressing applied.                                                                           

                                                                                                  

Administered Medications:                                                                         

03:17 Drug: Demerol (meperidine) 25 mg Route: IVP; Infused Over: 5 mins; Site: left forearm;  mg4 

04:25 Follow up: Response: No adverse reaction; Pain is decreased                             wg  

04:16 Drug: Rocephin (cefTRIAXone) 1 grams Route: IV; Rate: calculated rate; Site: left       wg  

      forearm;                                                                                    

04:27 Follow up: IV Status: Completed infusion                                                wg  

                                                                                                  

                                                                                                  

Outcome:                                                                                          

04:09 Discharge ordered by MD.                                                                rn  

04:24 Discharged to home ambulatory.                                                          wg  

04:24 Condition: stable                                                                           

04:24 Discharge instructions given to Instructed on discharge instructions, follow up and         

      referral plans. Demonstrated understanding of instructions, follow-up care,                 

      medications, Prescriptions given X 2.                                                       

04:29 Patient left the ED.                                                                    wg  

                                                                                                  

Signatures:                                                                                       

Stevie Elliott RN RN em Nieto, Roman, MD MD rn Paniauga, Brittany bp1 Garcia, Mose[place]                               mg4                                                  

Lew Estrada                              wg                                                   

                                                                                                  

**************************************************************************************************